# Patient Record
Sex: MALE | Race: WHITE | NOT HISPANIC OR LATINO | Employment: FULL TIME | URBAN - METROPOLITAN AREA
[De-identification: names, ages, dates, MRNs, and addresses within clinical notes are randomized per-mention and may not be internally consistent; named-entity substitution may affect disease eponyms.]

---

## 2020-02-28 ENCOUNTER — OFFICE VISIT (OUTPATIENT)
Dept: FAMILY MEDICINE CLINIC | Facility: CLINIC | Age: 60
End: 2020-02-28

## 2020-02-28 VITALS
HEART RATE: 88 BPM | RESPIRATION RATE: 16 BRPM | WEIGHT: 183 LBS | DIASTOLIC BLOOD PRESSURE: 60 MMHG | HEIGHT: 68 IN | SYSTOLIC BLOOD PRESSURE: 100 MMHG | TEMPERATURE: 98.3 F | OXYGEN SATURATION: 96 % | BODY MASS INDEX: 27.74 KG/M2

## 2020-02-28 DIAGNOSIS — Z23 NEED FOR VACCINATION: ICD-10-CM

## 2020-02-28 DIAGNOSIS — Z79.4 TYPE 2 DIABETES MELLITUS WITHOUT COMPLICATION, WITH LONG-TERM CURRENT USE OF INSULIN (HCC): Primary | ICD-10-CM

## 2020-02-28 DIAGNOSIS — E78.2 MIXED HYPERLIPIDEMIA: ICD-10-CM

## 2020-02-28 DIAGNOSIS — K21.9 GASTROESOPHAGEAL REFLUX DISEASE, ESOPHAGITIS PRESENCE NOT SPECIFIED: ICD-10-CM

## 2020-02-28 DIAGNOSIS — E11.9 TYPE 2 DIABETES MELLITUS WITHOUT COMPLICATION, WITH LONG-TERM CURRENT USE OF INSULIN (HCC): Primary | ICD-10-CM

## 2020-02-28 PROCEDURE — 90715 TDAP VACCINE 7 YRS/> IM: CPT | Performed by: NURSE PRACTITIONER

## 2020-02-28 PROCEDURE — 90471 IMMUNIZATION ADMIN: CPT | Performed by: NURSE PRACTITIONER

## 2020-02-28 PROCEDURE — 90750 HZV VACC RECOMBINANT IM: CPT | Performed by: NURSE PRACTITIONER

## 2020-02-28 PROCEDURE — 99204 OFFICE O/P NEW MOD 45 MIN: CPT | Performed by: NURSE PRACTITIONER

## 2020-02-28 PROCEDURE — 90472 IMMUNIZATION ADMIN EACH ADD: CPT | Performed by: NURSE PRACTITIONER

## 2020-02-28 RX ORDER — METFORMIN HYDROCHLORIDE 500 MG/1
1000 TABLET, EXTENDED RELEASE ORAL
Qty: 60 TABLET | Refills: 1 | Status: SHIPPED | OUTPATIENT
Start: 2020-02-28 | End: 2020-06-16 | Stop reason: SDUPTHER

## 2020-02-28 RX ORDER — LISINOPRIL 5 MG/1
5 TABLET ORAL DAILY
COMMUNITY
End: 2020-02-28 | Stop reason: SDUPTHER

## 2020-02-28 RX ORDER — ATORVASTATIN CALCIUM 20 MG/1
20 TABLET, FILM COATED ORAL DAILY
Qty: 30 TABLET | Refills: 5 | Status: SHIPPED | OUTPATIENT
Start: 2020-02-28 | End: 2020-10-21 | Stop reason: SDUPTHER

## 2020-02-28 RX ORDER — PANTOPRAZOLE SODIUM 40 MG/1
40 TABLET, DELAYED RELEASE ORAL DAILY
COMMUNITY
End: 2020-02-28 | Stop reason: SDUPTHER

## 2020-02-28 RX ORDER — FLASH GLUCOSE SENSOR
KIT MISCELLANEOUS
COMMUNITY
End: 2021-01-22

## 2020-02-28 RX ORDER — PANTOPRAZOLE SODIUM 40 MG/1
40 TABLET, DELAYED RELEASE ORAL DAILY
Qty: 60 TABLET | Refills: 5 | Status: SHIPPED | OUTPATIENT
Start: 2020-02-28 | End: 2020-04-15 | Stop reason: SDUPTHER

## 2020-02-28 RX ORDER — INSULIN DEGLUDEC INJECTION 100 U/ML
INJECTION, SOLUTION SUBCUTANEOUS
COMMUNITY
Start: 2020-02-22 | End: 2020-02-28 | Stop reason: SDUPTHER

## 2020-02-28 RX ORDER — SEMAGLUTIDE 1.34 MG/ML
INJECTION, SOLUTION SUBCUTANEOUS
COMMUNITY
Start: 2020-01-26 | End: 2020-02-28

## 2020-02-28 RX ORDER — NAPROXEN SODIUM 220 MG
220 TABLET ORAL 2 TIMES DAILY PRN
COMMUNITY
End: 2021-01-22 | Stop reason: SDUPTHER

## 2020-02-28 RX ORDER — TERBINAFINE HYDROCHLORIDE 250 MG/1
TABLET ORAL
COMMUNITY
Start: 2020-01-22 | End: 2020-10-23

## 2020-02-28 RX ORDER — INSULIN DEGLUDEC INJECTION 100 U/ML
32 INJECTION, SOLUTION SUBCUTANEOUS NIGHTLY
Qty: 5 PEN | Refills: 5 | Status: SHIPPED | OUTPATIENT
Start: 2020-02-28 | End: 2020-10-21 | Stop reason: SDUPTHER

## 2020-02-28 RX ORDER — SILDENAFIL 25 MG/1
25 TABLET, FILM COATED ORAL DAILY PRN
COMMUNITY
End: 2021-01-22 | Stop reason: SDUPTHER

## 2020-02-28 RX ORDER — METFORMIN HYDROCHLORIDE 500 MG/1
TABLET, EXTENDED RELEASE ORAL
COMMUNITY
Start: 2020-01-12 | End: 2020-02-28 | Stop reason: SDUPTHER

## 2020-02-28 RX ORDER — LISINOPRIL 5 MG/1
5 TABLET ORAL DAILY
Qty: 30 TABLET | Refills: 5 | Status: SHIPPED | OUTPATIENT
Start: 2020-02-28 | End: 2020-10-21 | Stop reason: SDUPTHER

## 2020-02-28 RX ORDER — EMPAGLIFLOZIN 10 MG/1
TABLET, FILM COATED ORAL
COMMUNITY
Start: 2020-02-22 | End: 2020-02-28 | Stop reason: SDUPTHER

## 2020-02-28 RX ORDER — ATORVASTATIN CALCIUM 20 MG/1
20 TABLET, FILM COATED ORAL DAILY
COMMUNITY
End: 2020-02-28 | Stop reason: ALTCHOICE

## 2020-02-28 RX ORDER — EMPAGLIFLOZIN 10 MG/1
10 TABLET, FILM COATED ORAL DAILY
Qty: 30 TABLET | Refills: 5 | Status: SHIPPED | OUTPATIENT
Start: 2020-02-28 | End: 2020-10-21 | Stop reason: SDUPTHER

## 2020-02-28 RX ORDER — CANAGLIFLOZIN 300 MG/1
TABLET, FILM COATED ORAL
COMMUNITY
Start: 2019-12-02 | End: 2020-02-28 | Stop reason: ALTCHOICE

## 2020-02-28 RX ORDER — OMEGA-3S/DHA/EPA/FISH OIL/D3 300MG-1000
400 CAPSULE ORAL DAILY
COMMUNITY
End: 2021-01-22 | Stop reason: SDUPTHER

## 2020-02-28 NOTE — PROGRESS NOTES
Subjective   Sadiq Aldana is a 59 y.o. male.     History of Present Illness    Sadiq Aldana 59 y.o. male who presents today for a new patient appointment.    he has a history of   Patient Active Problem List   Diagnosis   • Type 2 diabetes mellitus without complication, with long-term current use of insulin (CMS/[facility])   • Mixed hyperlipidemia   • Gastroesophageal reflux disease   .  he is here to establish care I reviewed the PFSH recorded today by my MA/LPN staff.   he   He has been feeling well.    Patient reports feeling well on current medication regimen. He had labs a few months ago with previous PCP but is unsure of results.  He is checking his glucose twice a day most days using his Freestyle Eulalio 14 day sensor. Reports his fasting will range from 150s-200s.  He was recently started on Ozempic and Jardiance.  He is tolerating well but would like to use Trulicity instead if it is covered.    Patient does not have HTN but is taking lisinopril for renal protective properties.    He takes pantoprazole BID per his PCP.        The following portions of the patient's history were reviewed and updated as appropriate: allergies, current medications, past family history, past medical history, past social history, past surgical history and problem list.    Review of Systems   Constitutional: Negative for fatigue.   Respiratory: Negative for cough and shortness of breath.    Cardiovascular: Negative for chest pain and palpitations.   Endocrine: Negative for cold intolerance, heat intolerance, polydipsia, polyphagia and polyuria.   Skin: Negative for rash.   Psychiatric/Behavioral: Negative for dysphoric mood and sleep disturbance. The patient is not nervous/anxious.        Objective   Physical Exam   Constitutional: He is oriented to person, place, and time. He appears well-developed and well-nourished.   Neck: Carotid bruit is not present.   Cardiovascular: Normal rate and regular rhythm.   Pulmonary/Chest: Effort  normal and breath sounds normal.   Neurological: He is oriented to person, place, and time.   Skin: Skin is warm and dry.   Psychiatric: He has a normal mood and affect. His behavior is normal. Judgment and thought content normal.   Nursing note and vitals reviewed.      Assessment/Plan   Sadiq was seen today for establish care and diabetes.    Diagnoses and all orders for this visit:    Type 2 diabetes mellitus without complication, with long-term current use of insulin (CMS/[facility])  -     Dulaglutide (TRULICITY) 0.75 MG/0.5ML solution pen-injector; Inject 0.75 mg under the skin into the appropriate area as directed 1 (One) Time Per Week.  -     TRESIBA FLEXTOUCH 100 UNIT/ML solution pen-injector injection; Inject 32 Units under the skin into the appropriate area as directed Every Night.  -     metFORMIN ER (GLUCOPHAGE-XR) 500 MG 24 hr tablet; Take 2 tablets by mouth Daily With Breakfast.  -     lisinopril (PRINIVIL,ZESTRIL) 5 MG tablet; Take 1 tablet by mouth Daily.  -     JARDIANCE 10 MG tablet; Take 10 mg by mouth Daily.  -     pantoprazole (PROTONIX) 40 MG EC tablet; Take 1 tablet by mouth Daily.  -     atorvastatin (LIPITOR) 20 MG tablet; Take 1 tablet by mouth Daily.  -     Comprehensive metabolic panel  -     Lipid panel  -     CBC and Differential  -     TSH  -     Hemoglobin A1c  -     MicroAlbumin, Urine, Random - Urine, Clean Catch  -     PSA Screen    Mixed hyperlipidemia    Gastroesophageal reflux disease, esophagitis presence not specified    Need for vaccination  -     Shingrix Vaccine  -     Tdap Vaccine Greater Than or Equal To 6yo IM        Ozempic changed to Trulicity at patient request.    Pantoprazole refilled at BID dosing but patient will try decreasing to once daily at bedtime.    Labs this week.    Tdap and shingrix today.

## 2020-03-08 LAB
ALBUMIN SERPL-MCNC: 4.5 G/DL (ref 3.8–4.9)
ALBUMIN/GLOB SERPL: 2.4 {RATIO} (ref 1.2–2.2)
ALP SERPL-CCNC: 77 IU/L (ref 39–117)
ALT SERPL-CCNC: 16 IU/L (ref 0–44)
AST SERPL-CCNC: 20 IU/L (ref 0–40)
BASOPHILS # BLD AUTO: 0.1 X10E3/UL (ref 0–0.2)
BASOPHILS NFR BLD AUTO: 1 %
BILIRUB SERPL-MCNC: 0.4 MG/DL (ref 0–1.2)
BUN SERPL-MCNC: 17 MG/DL (ref 6–24)
BUN/CREAT SERPL: 14 (ref 9–20)
CALCIUM SERPL-MCNC: 9.6 MG/DL (ref 8.7–10.2)
CHLORIDE SERPL-SCNC: 101 MMOL/L (ref 96–106)
CHOLEST SERPL-MCNC: 138 MG/DL (ref 100–199)
CO2 SERPL-SCNC: 25 MMOL/L (ref 20–29)
CREAT SERPL-MCNC: 1.22 MG/DL (ref 0.76–1.27)
EOSINOPHIL # BLD AUTO: 0.2 X10E3/UL (ref 0–0.4)
EOSINOPHIL NFR BLD AUTO: 3 %
ERYTHROCYTE [DISTWIDTH] IN BLOOD BY AUTOMATED COUNT: 12.1 % (ref 11.6–15.4)
GLOBULIN SER CALC-MCNC: 1.9 G/DL (ref 1.5–4.5)
GLUCOSE SERPL-MCNC: 130 MG/DL (ref 65–99)
HBA1C MFR BLD: 9.8 % (ref 4.8–5.6)
HCT VFR BLD AUTO: 46 % (ref 37.5–51)
HDLC SERPL-MCNC: 42 MG/DL
HGB BLD-MCNC: 15.2 G/DL (ref 13–17.7)
IMM GRANULOCYTES # BLD AUTO: 0 X10E3/UL (ref 0–0.1)
IMM GRANULOCYTES NFR BLD AUTO: 0 %
LDLC SERPL CALC-MCNC: 80 MG/DL (ref 0–99)
LYMPHOCYTES # BLD AUTO: 1.3 X10E3/UL (ref 0.7–3.1)
LYMPHOCYTES NFR BLD AUTO: 23 %
MCH RBC QN AUTO: 29.6 PG (ref 26.6–33)
MCHC RBC AUTO-ENTMCNC: 33 G/DL (ref 31.5–35.7)
MCV RBC AUTO: 90 FL (ref 79–97)
MICROALBUMIN UR-MCNC: 5.7 UG/ML
MONOCYTES # BLD AUTO: 0.5 X10E3/UL (ref 0.1–0.9)
MONOCYTES NFR BLD AUTO: 9 %
NEUTROPHILS # BLD AUTO: 3.6 X10E3/UL (ref 1.4–7)
NEUTROPHILS NFR BLD AUTO: 64 %
PLATELET # BLD AUTO: 336 X10E3/UL (ref 150–450)
POTASSIUM SERPL-SCNC: 5 MMOL/L (ref 3.5–5.2)
PROT SERPL-MCNC: 6.4 G/DL (ref 6–8.5)
PSA SERPL-MCNC: 1.2 NG/ML (ref 0–4)
RBC # BLD AUTO: 5.13 X10E6/UL (ref 4.14–5.8)
SODIUM SERPL-SCNC: 142 MMOL/L (ref 134–144)
TRIGL SERPL-MCNC: 81 MG/DL (ref 0–149)
TSH SERPL DL<=0.005 MIU/L-ACNC: 1.04 UIU/ML (ref 0.45–4.5)
VLDLC SERPL CALC-MCNC: 16 MG/DL (ref 5–40)
WBC # BLD AUTO: 5.6 X10E3/UL (ref 3.4–10.8)

## 2020-03-13 DIAGNOSIS — E11.21 TYPE 2 DIABETES MELLITUS WITH DIABETIC NEPHROPATHY, UNSPECIFIED WHETHER LONG TERM INSULIN USE (HCC): Primary | ICD-10-CM

## 2020-03-30 RX ORDER — FLASH GLUCOSE SENSOR
1 KIT MISCELLANEOUS 3 TIMES DAILY
Qty: 1 EACH | Refills: 0 | Status: SHIPPED | OUTPATIENT
Start: 2020-03-30 | End: 2020-10-21 | Stop reason: SDUPTHER

## 2020-04-15 ENCOUNTER — TELEPHONE (OUTPATIENT)
Dept: FAMILY MEDICINE CLINIC | Facility: CLINIC | Age: 60
End: 2020-04-15

## 2020-04-15 DIAGNOSIS — E11.9 TYPE 2 DIABETES MELLITUS WITHOUT COMPLICATION, WITH LONG-TERM CURRENT USE OF INSULIN (HCC): ICD-10-CM

## 2020-04-15 DIAGNOSIS — Z79.4 TYPE 2 DIABETES MELLITUS WITHOUT COMPLICATION, WITH LONG-TERM CURRENT USE OF INSULIN (HCC): ICD-10-CM

## 2020-04-15 RX ORDER — PANTOPRAZOLE SODIUM 40 MG/1
40 TABLET, DELAYED RELEASE ORAL 2 TIMES DAILY
Qty: 60 TABLET | Refills: 5 | OUTPATIENT
Start: 2020-04-15 | End: 2020-10-21 | Stop reason: SDUPTHER

## 2020-05-29 DIAGNOSIS — Z79.4 TYPE 2 DIABETES MELLITUS WITHOUT COMPLICATION, WITH LONG-TERM CURRENT USE OF INSULIN (HCC): ICD-10-CM

## 2020-05-29 DIAGNOSIS — E11.9 TYPE 2 DIABETES MELLITUS WITHOUT COMPLICATION, WITH LONG-TERM CURRENT USE OF INSULIN (HCC): ICD-10-CM

## 2020-05-29 RX ORDER — METFORMIN HYDROCHLORIDE 500 MG/1
TABLET, EXTENDED RELEASE ORAL
Qty: 60 TABLET | Refills: 0 | OUTPATIENT
Start: 2020-05-29

## 2020-06-03 ENCOUNTER — OFFICE VISIT (OUTPATIENT)
Dept: FAMILY MEDICINE CLINIC | Facility: CLINIC | Age: 60
End: 2020-06-03

## 2020-06-03 VITALS
HEART RATE: 92 BPM | RESPIRATION RATE: 16 BRPM | OXYGEN SATURATION: 95 % | DIASTOLIC BLOOD PRESSURE: 81 MMHG | SYSTOLIC BLOOD PRESSURE: 131 MMHG | HEIGHT: 68 IN | BODY MASS INDEX: 30.77 KG/M2 | TEMPERATURE: 97.7 F | WEIGHT: 203 LBS

## 2020-06-03 DIAGNOSIS — M54.42 ACUTE LEFT-SIDED LOW BACK PAIN WITH LEFT-SIDED SCIATICA: Primary | ICD-10-CM

## 2020-06-03 PROCEDURE — 99214 OFFICE O/P EST MOD 30 MIN: CPT | Performed by: FAMILY MEDICINE

## 2020-06-03 RX ORDER — METHYLPREDNISOLONE 4 MG/1
TABLET ORAL
Qty: 21 TABLET | Refills: 0 | Status: SHIPPED | OUTPATIENT
Start: 2020-06-03 | End: 2020-06-09

## 2020-06-03 RX ORDER — CYCLOBENZAPRINE HCL 5 MG
5 TABLET ORAL NIGHTLY PRN
Qty: 10 TABLET | Refills: 0 | Status: SHIPPED | OUTPATIENT
Start: 2020-06-03 | End: 2020-06-16

## 2020-06-03 NOTE — ASSESSMENT & PLAN NOTE
New problem.  Agree with physical therapy as ordered.  Will add Medrol Dosepak to decrease inflammation and add muscle relaxer at bedtime dosing only.  Follow-up 3 weeks or as needed if symptoms change or worsen.

## 2020-06-03 NOTE — PROGRESS NOTES
"   Subjective       Chief Complaint   Patient presents with   • pinched nerve     left leg          HPI:       Sadiq Aldana is a 59 y.o. male who presents to the office today to evaluate back pain and left calf pain.  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  Symptoms worsened after 1 day and all last week he developed some pain that radiated down to the left calf.  Currently he is largest amount of pain is in the left calf and less so in the low back on the left side.  He had similar symptoms in 2010 and had work-up including MRI back injections which were then effective.  He had good results in the past with physical therapy.  He has an appointment with [facility] physical therapy on Tuesday.  So far everything he is tried over-the-counter is not work for his discomfort.  Pain is moderate to severe in nature.  Sitting causes worsening pain.  Standing improves the calf pain but causes the rest of his leg to hurt.  Laying down is not comfortable.    I have reviewed and updated his medications, medical history and problem list during today's office visit.   This patient's ACP documentation is up to date, and there's nothing further left to document.   Social History     Tobacco Use   • Smoking status: Never Smoker   • Smokeless tobacco: Never Used   Substance Use Topics   • Alcohol use: Yes     Comment: occasional       Review of Systems   Musculoskeletal:        See HPI   Neurological: Negative for numbness.         PE:   Objective   /81   Pulse 92   Temp 97.7 °F (36.5 °C) (Temporal)   Resp 16   Ht 172.7 cm (68\")   Wt 92.1 kg (203 lb)   SpO2 95%   BMI 30.87 kg/m²     Body mass index is 30.87 kg/m².    Physical Exam   Constitutional: He appears well-developed. He appears distressed (with certain movements).   Musculoskeletal:        Lumbar back: He exhibits decreased range of motion. He exhibits no tenderness, no " bony tenderness, no swelling and no spasm.        Back:    Positive SLR on left at 90 degrees   Psychiatric: He has a normal mood and affect. His speech is normal and behavior is normal. He is attentive.        Data Reviewed:                   A/P:     Assessment/Plan   Diagnoses and all orders for this visit:    1. Acute left-sided low back pain with left-sided sciatica (Primary)  Assessment & Plan:  New problem.  Agree with physical therapy as ordered.  Will add Medrol Dosepak to decrease inflammation and add muscle relaxer at bedtime dosing only.  Follow-up 3 weeks or as needed if symptoms change or worsen.    Orders:  -     methylPREDNISolone (MEDROL, MANJIT,) 4 MG tablet; Take as directed on package instructions.  Dispense: 21 tablet; Refill: 0  -     cyclobenzaprine (FLEXERIL) 5 MG tablet; Take 1 tablet by mouth At Night As Needed for Muscle Spasms for up to 10 days. Avoid with driving. Do not use alcohol with this prescription  Dispense: 10 tablet; Refill: 0  -     Ambulatory Referral to Physical Therapy Evaluate and treat        Follow up:    Return in about 3 weeks (around 6/24/2020) for Recheck/Medication  Refill.

## 2020-06-12 DIAGNOSIS — Z79.4 TYPE 2 DIABETES MELLITUS WITHOUT COMPLICATION, WITH LONG-TERM CURRENT USE OF INSULIN (HCC): ICD-10-CM

## 2020-06-12 DIAGNOSIS — E11.9 TYPE 2 DIABETES MELLITUS WITHOUT COMPLICATION, WITH LONG-TERM CURRENT USE OF INSULIN (HCC): ICD-10-CM

## 2020-06-12 RX ORDER — METFORMIN HYDROCHLORIDE 500 MG/1
TABLET, EXTENDED RELEASE ORAL
Qty: 60 TABLET | Refills: 0 | OUTPATIENT
Start: 2020-06-12

## 2020-06-16 ENCOUNTER — OFFICE VISIT (OUTPATIENT)
Dept: FAMILY MEDICINE CLINIC | Facility: CLINIC | Age: 60
End: 2020-06-16

## 2020-06-16 VITALS
HEIGHT: 68 IN | HEART RATE: 87 BPM | RESPIRATION RATE: 16 BRPM | BODY MASS INDEX: 29.86 KG/M2 | OXYGEN SATURATION: 96 % | SYSTOLIC BLOOD PRESSURE: 110 MMHG | TEMPERATURE: 97.8 F | WEIGHT: 197 LBS | DIASTOLIC BLOOD PRESSURE: 64 MMHG

## 2020-06-16 DIAGNOSIS — E11.9 TYPE 2 DIABETES MELLITUS WITHOUT COMPLICATION, WITH LONG-TERM CURRENT USE OF INSULIN (HCC): ICD-10-CM

## 2020-06-16 DIAGNOSIS — Z79.4 TYPE 2 DIABETES MELLITUS WITHOUT COMPLICATION, WITH LONG-TERM CURRENT USE OF INSULIN (HCC): ICD-10-CM

## 2020-06-16 PROCEDURE — 99212 OFFICE O/P EST SF 10 MIN: CPT | Performed by: NURSE PRACTITIONER

## 2020-06-16 RX ORDER — METFORMIN HYDROCHLORIDE 500 MG/1
1000 TABLET, EXTENDED RELEASE ORAL
Qty: 60 TABLET | Refills: 1 | Status: SHIPPED | OUTPATIENT
Start: 2020-06-16 | End: 2020-08-18

## 2020-06-16 NOTE — PROGRESS NOTES
"Vernon Aldana is a 59 y.o. male.     History of Present Illness    Since the last visit, he has overall felt well.  He has He is following up on type 2 DM.  His A1c was 9.8 at last visit.  He has not had repeat A1c yet.  He was changed to Trulicity at last visit and was to decrease glucose and carbs.  he has been compliant with current medications have reviewed them.  The patient denies medication side effects.  Will refill medications. /64   Pulse 87   Temp 97.8 °F (36.6 °C)   Resp 16   Ht 172.7 cm (68\")   Wt 89.4 kg (197 lb)   SpO2 96%   BMI 29.95 kg/m²     Results for orders placed or performed in visit on 02/28/20   Comprehensive metabolic panel   Result Value Ref Range    Glucose 130 (H) 65 - 99 mg/dL    BUN 17 6 - 24 mg/dL    Creatinine 1.22 0.76 - 1.27 mg/dL    eGFR Non African Am 64 >59 mL/min/1.73    eGFR African Am 75 >59 mL/min/1.73    BUN/Creatinine Ratio 14 9 - 20    Sodium 142 134 - 144 mmol/L    Potassium 5.0 3.5 - 5.2 mmol/L    Chloride 101 96 - 106 mmol/L    Total CO2 25 20 - 29 mmol/L    Calcium 9.6 8.7 - 10.2 mg/dL    Total Protein 6.4 6.0 - 8.5 g/dL    Albumin 4.5 3.8 - 4.9 g/dL    Globulin 1.9 1.5 - 4.5 g/dL    A/G Ratio 2.4 (H) 1.2 - 2.2    Total Bilirubin 0.4 0.0 - 1.2 mg/dL    Alkaline Phosphatase 77 39 - 117 IU/L    AST (SGOT) 20 0 - 40 IU/L    ALT (SGPT) 16 0 - 44 IU/L   Lipid panel   Result Value Ref Range    Total Cholesterol 138 100 - 199 mg/dL    Triglycerides 81 0 - 149 mg/dL    HDL Cholesterol 42 >39 mg/dL    VLDL Cholesterol 16 5 - 40 mg/dL    LDL Cholesterol  80 0 - 99 mg/dL   TSH   Result Value Ref Range    TSH 1.040 0.450 - 4.500 uIU/mL   Hemoglobin A1c   Result Value Ref Range    Hemoglobin A1C 9.8 (H) 4.8 - 5.6 %   MicroAlbumin, Urine, Random - Urine, Clean Catch   Result Value Ref Range    Microalbumin, Urine 5.7 Not Estab. ug/mL   PSA Screen   Result Value Ref Range    PSA 1.2 0.0 - 4.0 ng/mL   CBC and Differential   Result Value Ref Range    WBC 5.6 " 3.4 - 10.8 x10E3/uL    RBC 5.13 4.14 - 5.80 x10E6/uL    Hemoglobin 15.2 13.0 - 17.7 g/dL    Hematocrit 46.0 37.5 - 51.0 %    MCV 90 79 - 97 fL    MCH 29.6 26.6 - 33.0 pg    MCHC 33.0 31.5 - 35.7 g/dL    RDW 12.1 11.6 - 15.4 %    Platelets 336 150 - 450 x10E3/uL    Neutrophil Rel % 64 Not Estab. %    Lymphocyte Rel % 23 Not Estab. %    Monocyte Rel % 9 Not Estab. %    Eosinophil Rel % 3 Not Estab. %    Basophil Rel % 1 Not Estab. %    Neutrophils Absolute 3.6 1.4 - 7.0 x10E3/uL    Lymphocytes Absolute 1.3 0.7 - 3.1 x10E3/uL    Monocytes Absolute 0.5 0.1 - 0.9 x10E3/uL    Eosinophils Absolute 0.2 0.0 - 0.4 x10E3/uL    Basophils Absolute 0.1 0.0 - 0.2 x10E3/uL    Immature Granulocyte Rel % 0 Not Estab. %    Immature Grans Absolute 0.0 0.0 - 0.1 x10E3/uL       Reports glucose at home has ranged from 120-200.  He was not eating well for the months he was off work due to COVID but has been doing better the past couple of weeks.    The following portions of the patient's history were reviewed and updated as appropriate: allergies, current medications, past family history, past medical history, past social history, past surgical history and problem list.    Review of Systems   Constitutional: Negative for fatigue.   Respiratory: Negative for cough and shortness of breath.    Cardiovascular: Negative for chest pain and palpitations.   Endocrine: Negative for cold intolerance, heat intolerance, polydipsia, polyphagia and polyuria.   Skin: Negative for rash.   Psychiatric/Behavioral: Negative for dysphoric mood and sleep disturbance. The patient is not nervous/anxious.        Objective   Physical Exam   Constitutional: He is oriented to person, place, and time. He appears well-developed and well-nourished.   Pulmonary/Chest: Effort normal.   Neurological: He is alert and oriented to person, place, and time.   Psychiatric: He has a normal mood and affect. His behavior is normal. Judgment and thought content normal.   Nursing  note and vitals reviewed.      Assessment/Plan   Sadiq was seen today for diabetes and hyperlipidemia.    Diagnoses and all orders for this visit:    Type 2 diabetes mellitus without complication, with long-term current use of insulin (CMS/[facility])  -     metFORMIN ER (GLUCOPHAGE-XR) 500 MG 24 hr tablet; Take 2 tablets by mouth Daily With Breakfast.  -     Dulaglutide (Trulicity) 1.5 MG/0.5ML solution pen-injector; Inject 1.5 mg under the skin into the appropriate area as directed 1 (One) Time Per Week.        A1c this week

## 2020-06-17 LAB — HBA1C MFR BLD: 10.5 % (ref 4.8–5.6)

## 2020-06-18 ENCOUNTER — RESULTS ENCOUNTER (OUTPATIENT)
Dept: FAMILY MEDICINE CLINIC | Facility: CLINIC | Age: 60
End: 2020-06-18

## 2020-06-18 DIAGNOSIS — E11.21 TYPE 2 DIABETES MELLITUS WITH DIABETIC NEPHROPATHY, UNSPECIFIED WHETHER LONG TERM INSULIN USE (HCC): ICD-10-CM

## 2020-06-18 DIAGNOSIS — R73.09 HEMOGLOBIN A1C 8.0 PERCENT OR GREATER: Primary | ICD-10-CM

## 2020-08-16 DIAGNOSIS — Z79.4 TYPE 2 DIABETES MELLITUS WITHOUT COMPLICATION, WITH LONG-TERM CURRENT USE OF INSULIN (HCC): ICD-10-CM

## 2020-08-16 DIAGNOSIS — E11.9 TYPE 2 DIABETES MELLITUS WITHOUT COMPLICATION, WITH LONG-TERM CURRENT USE OF INSULIN (HCC): ICD-10-CM

## 2020-08-18 RX ORDER — METFORMIN HYDROCHLORIDE 500 MG/1
TABLET, EXTENDED RELEASE ORAL
Qty: 60 TABLET | Refills: 0 | Status: SHIPPED | OUTPATIENT
Start: 2020-08-18 | End: 2020-10-21 | Stop reason: SDUPTHER

## 2020-10-15 DIAGNOSIS — E11.9 TYPE 2 DIABETES MELLITUS WITHOUT COMPLICATION, WITH LONG-TERM CURRENT USE OF INSULIN (HCC): ICD-10-CM

## 2020-10-15 DIAGNOSIS — Z79.4 TYPE 2 DIABETES MELLITUS WITHOUT COMPLICATION, WITH LONG-TERM CURRENT USE OF INSULIN (HCC): ICD-10-CM

## 2020-10-15 RX ORDER — EMPAGLIFLOZIN 10 MG/1
TABLET, FILM COATED ORAL
Qty: 30 TABLET | Refills: 4 | OUTPATIENT
Start: 2020-10-15

## 2020-10-15 RX ORDER — METFORMIN HYDROCHLORIDE 500 MG/1
TABLET, EXTENDED RELEASE ORAL
Qty: 60 TABLET | Refills: 0 | OUTPATIENT
Start: 2020-10-15

## 2020-10-20 ENCOUNTER — TELEPHONE (OUTPATIENT)
Dept: FAMILY MEDICINE CLINIC | Facility: CLINIC | Age: 60
End: 2020-10-20

## 2020-10-20 NOTE — TELEPHONE ENCOUNTER
Rescheduled appt for tomorrow 10/21/20 but patient is completely out of medications.  He would like meds sent in ASAP/kk

## 2020-10-21 ENCOUNTER — TELEMEDICINE (OUTPATIENT)
Dept: FAMILY MEDICINE CLINIC | Facility: CLINIC | Age: 60
End: 2020-10-21

## 2020-10-21 DIAGNOSIS — E11.65 UNCONTROLLED TYPE 2 DIABETES MELLITUS WITH HYPERGLYCEMIA (HCC): Primary | ICD-10-CM

## 2020-10-21 DIAGNOSIS — Z79.4 TYPE 2 DIABETES MELLITUS WITHOUT COMPLICATION, WITH LONG-TERM CURRENT USE OF INSULIN (HCC): ICD-10-CM

## 2020-10-21 DIAGNOSIS — E78.2 MIXED HYPERLIPIDEMIA: ICD-10-CM

## 2020-10-21 DIAGNOSIS — E11.9 TYPE 2 DIABETES MELLITUS WITHOUT COMPLICATION, WITH LONG-TERM CURRENT USE OF INSULIN (HCC): ICD-10-CM

## 2020-10-21 DIAGNOSIS — I10 ESSENTIAL HYPERTENSION: ICD-10-CM

## 2020-10-21 DIAGNOSIS — K21.9 GASTROESOPHAGEAL REFLUX DISEASE, UNSPECIFIED WHETHER ESOPHAGITIS PRESENT: ICD-10-CM

## 2020-10-21 PROCEDURE — 99213 OFFICE O/P EST LOW 20 MIN: CPT | Performed by: NURSE PRACTITIONER

## 2020-10-21 RX ORDER — INSULIN DEGLUDEC INJECTION 100 U/ML
32 INJECTION, SOLUTION SUBCUTANEOUS NIGHTLY
Qty: 5 PEN | Refills: 0 | Status: SHIPPED | OUTPATIENT
Start: 2020-10-21 | End: 2021-01-11 | Stop reason: SDUPTHER

## 2020-10-21 RX ORDER — EMPAGLIFLOZIN 10 MG/1
10 TABLET, FILM COATED ORAL DAILY
Qty: 30 TABLET | Refills: 5 | Status: CANCELLED | OUTPATIENT
Start: 2020-10-21

## 2020-10-21 RX ORDER — DULAGLUTIDE 1.5 MG/.5ML
1.5 INJECTION, SOLUTION SUBCUTANEOUS WEEKLY
Qty: 12 PEN | Refills: 0 | Status: SHIPPED | OUTPATIENT
Start: 2020-10-21 | End: 2021-01-04 | Stop reason: SDUPTHER

## 2020-10-21 RX ORDER — INSULIN DEGLUDEC INJECTION 100 U/ML
32 INJECTION, SOLUTION SUBCUTANEOUS NIGHTLY
Qty: 5 PEN | Refills: 5 | Status: CANCELLED | OUTPATIENT
Start: 2020-10-21

## 2020-10-21 RX ORDER — METFORMIN HYDROCHLORIDE 500 MG/1
1000 TABLET, EXTENDED RELEASE ORAL
Qty: 180 TABLET | Refills: 0 | Status: SHIPPED | OUTPATIENT
Start: 2020-10-21 | End: 2021-01-15

## 2020-10-21 RX ORDER — FLASH GLUCOSE SENSOR
1 KIT MISCELLANEOUS 3 TIMES DAILY
Qty: 9 EACH | Refills: 0 | Status: SHIPPED | OUTPATIENT
Start: 2020-10-21 | End: 2021-01-22 | Stop reason: SDUPTHER

## 2020-10-21 RX ORDER — ATORVASTATIN CALCIUM 20 MG/1
20 TABLET, FILM COATED ORAL DAILY
Qty: 30 TABLET | Refills: 5 | Status: SHIPPED | OUTPATIENT
Start: 2020-10-21 | End: 2020-10-23

## 2020-10-21 RX ORDER — PANTOPRAZOLE SODIUM 40 MG/1
40 TABLET, DELAYED RELEASE ORAL 2 TIMES DAILY
Qty: 60 TABLET | Refills: 5 | OUTPATIENT
Start: 2020-10-21 | End: 2021-01-04 | Stop reason: SDUPTHER

## 2020-10-21 RX ORDER — EMPAGLIFLOZIN 10 MG/1
10 TABLET, FILM COATED ORAL DAILY
Qty: 90 TABLET | Refills: 0 | Status: SHIPPED | OUTPATIENT
Start: 2020-10-21 | End: 2021-01-22 | Stop reason: SDUPTHER

## 2020-10-21 RX ORDER — LISINOPRIL 5 MG/1
5 TABLET ORAL DAILY
Qty: 30 TABLET | Refills: 5 | Status: SHIPPED | OUTPATIENT
Start: 2020-10-21 | End: 2021-01-04 | Stop reason: SDUPTHER

## 2020-10-21 RX ORDER — METFORMIN HYDROCHLORIDE 500 MG/1
1000 TABLET, EXTENDED RELEASE ORAL
Qty: 60 TABLET | Refills: 0 | Status: CANCELLED | OUTPATIENT
Start: 2020-10-21

## 2020-10-21 NOTE — PROGRESS NOTES
Subjective   Sadiq Aldana is a 59 y.o. male.   You have chosen to receive care through a telehealth visit.  Do you consent to use a video/audio connection for your medical care today? Yes  History of Present Illness    Since the last visit, he has overall felt well.  He has Essential Hypertension and well controlled on current medication and DM 2 and was unable to see endocrinology as the doctor left the practice. Patient has been unable to get in with a different provider.  He is still taking Tresiba, Trulicity, Jardiance, metformin but is out of medications.  he has been compliant with current medications have reviewed them.  The patient denies medication side effects.  Will refill medications. There were no vitals taken for this visit.    Results for orders placed or performed in visit on 06/18/20   Hemoglobin A1c    Specimen: Blood   Result Value Ref Range    Hemoglobin A1C 10.5 (H) 4.8 - 5.6 %     He is due for medication refills and labs. He is compliant with his atorvastatin, lisinopril and pantoprazole.      The following portions of the patient's history were reviewed and updated as appropriate: allergies, current medications, past family history, past medical history, past social history, past surgical history and problem list.    Review of Systems   Constitutional: Negative for fatigue.   Eyes: Negative for visual disturbance.   Respiratory: Negative for cough and shortness of breath.    Cardiovascular: Negative for chest pain and palpitations.   Endocrine: Negative for cold intolerance, heat intolerance, polydipsia, polyphagia and polyuria.   Skin: Negative for rash.   Psychiatric/Behavioral: Negative for dysphoric mood and sleep disturbance. The patient is not nervous/anxious.        Objective   Physical Exam  Vitals signs and nursing note reviewed.   Constitutional:       Appearance: He is well-developed.   Pulmonary:      Effort: Pulmonary effort is normal.   Neurological:      Mental Status: He is  oriented to person, place, and time.   Psychiatric:         Behavior: Behavior normal.         Thought Content: Thought content normal.         Judgment: Judgment normal.         Assessment/Plan   Diagnoses and all orders for this visit:    1. Uncontrolled type 2 diabetes mellitus with hyperglycemia (CMS/HCC) (Primary)  -     Ambulatory Referral to Endocrinology  -     Continuous Blood Gluc Sensor (FreeStyle Eulalio 14 Day Sensor) misc; 1 Units 3 (Three) Times a Day.  Dispense: 9 each; Refill: 0  -     Comprehensive metabolic panel  -     Lipid panel  -     CBC and Differential  -     TSH  -     Hemoglobin A1c  -     MicroAlbumin, Urine, Random - Urine, Clean Catch    2. Type 2 diabetes mellitus without complication, with long-term current use of insulin (CMS/[facility])  -     Tresiba FlexTouch 100 UNIT/ML solution pen-injector injection; Inject 32 Units under the skin into the appropriate area as directed Every Night.  Dispense: 5 pen; Refill: 0  -     metFORMIN ER (GLUCOPHAGE-XR) 500 MG 24 hr tablet; Take 2 tablets by mouth Daily With Breakfast.  Dispense: 180 tablet; Refill: 0  -     Dulaglutide (Trulicity) 1.5 MG/0.5ML solution pen-injector; Inject 1.5 mg under the skin into the appropriate area as directed 1 (One) Time Per Week.  Dispense: 12 pen; Refill: 0  -     Jardiance 10 MG tablet; Take 10 mg by mouth Daily.  Dispense: 90 tablet; Refill: 0    3. Mixed hyperlipidemia    4. Gastroesophageal reflux disease, unspecified whether esophagitis present    5. Essential hypertension          This visit has been rescheduled as a phone/video visit to comply with patient safety concerns in accordance with CDC recommendations. Total time of discussion was 14 minutes.      Referral placed to endocrinologist of patient's choice.  Labs ordered. Meds refilled until he is able to get in with specialist.

## 2020-10-23 ENCOUNTER — OFFICE VISIT (OUTPATIENT)
Dept: INTERNAL MEDICINE | Facility: CLINIC | Age: 60
End: 2020-10-23

## 2020-10-23 VITALS
OXYGEN SATURATION: 98 % | DIASTOLIC BLOOD PRESSURE: 80 MMHG | SYSTOLIC BLOOD PRESSURE: 146 MMHG | BODY MASS INDEX: 30.46 KG/M2 | WEIGHT: 201 LBS | HEIGHT: 68 IN | HEART RATE: 95 BPM

## 2020-10-23 DIAGNOSIS — E66.01 MORBID (SEVERE) OBESITY DUE TO EXCESS CALORIES (HCC): ICD-10-CM

## 2020-10-23 DIAGNOSIS — Z79.4 TYPE 2 DIABETES MELLITUS WITHOUT COMPLICATION, WITH LONG-TERM CURRENT USE OF INSULIN (HCC): Primary | ICD-10-CM

## 2020-10-23 DIAGNOSIS — I10 ESSENTIAL HYPERTENSION: ICD-10-CM

## 2020-10-23 DIAGNOSIS — E78.2 MIXED HYPERLIPIDEMIA: ICD-10-CM

## 2020-10-23 DIAGNOSIS — E11.9 TYPE 2 DIABETES MELLITUS WITHOUT COMPLICATION, WITH LONG-TERM CURRENT USE OF INSULIN (HCC): Primary | ICD-10-CM

## 2020-10-23 PROCEDURE — 99204 OFFICE O/P NEW MOD 45 MIN: CPT | Performed by: FAMILY MEDICINE

## 2020-10-23 RX ORDER — ATORVASTATIN CALCIUM 40 MG/1
40 TABLET, FILM COATED ORAL DAILY
Qty: 90 TABLET | Refills: 4 | Status: SHIPPED | OUTPATIENT
Start: 2020-10-23 | End: 2021-01-04 | Stop reason: SDUPTHER

## 2020-10-23 RX ORDER — ASPIRIN 81 MG/1
81 TABLET ORAL DAILY
COMMUNITY
End: 2021-01-22 | Stop reason: SDUPTHER

## 2020-10-23 NOTE — PROGRESS NOTES
10/23/2020    Patient Information  Sadiq Aldana                                                                                          [address]    [place] [address]      Chief Complaint:   Chief Complaint   Patient presents with   • Establish Care   • Dizziness          History of Present Illness:   pt here to est care.  Not requesting med refills.  reports blood sugars are in 200-300s.   Endorses some mild vertigo only at night and sometimes in the morning. He reports of increased chronic nasal congestion recently.  Denies any cp, palpitations, lightheadedness, abdm pain, stool/urinary changes. No vision changes.      Review of Systems   Constitution: Negative.   HENT: Positive for congestion.    Eyes: Negative.    Cardiovascular: Negative.    Respiratory: Negative.    Endocrine: Negative.    Hematologic/Lymphatic: Negative.    Skin: Negative.    Musculoskeletal: Negative.    Gastrointestinal: Negative.    Genitourinary: Negative.    Neurological: Negative.    Psychiatric/Behavioral: Negative.    Allergic/Immunologic: Negative.        Active Problems:    Patient Active Problem List   Diagnosis   • Type 2 diabetes mellitus without complication, with long-term current use of insulin (CMS/[facility])   • Mixed hyperlipidemia   • Gastroesophageal reflux disease   • Acute left-sided low back pain with left-sided sciatica         Past Medical History:   Diagnosis Date   • Diabetes mellitus (CMS/HCC)    • Erectile dysfunction    • GERD (gastroesophageal reflux disease)    • Hyperlipidemia    • Hypertension    • Peptic ulceration          Past Surgical History:   Procedure Laterality Date   • CHOLECYSTECTOMY     • FRACTURE SURGERY           No Known Allergies        Current Outpatient Medications:   •  aspirin 81 MG EC tablet, Take 81 mg by mouth Daily., Disp: , Rfl:   •  cholecalciferol (VITAMIN D3) 10 MCG (400 UNIT) tablet, Take 400 Units by mouth Daily., Disp: , Rfl:   •  Continuous  Blood Gluc Sensor (FREESTYLE PRAKASH 14 DAY SENSOR) misc, , Disp: , Rfl:   •  Continuous Blood Gluc Sensor (FreeStyle Prakash 14 Day Sensor) misc, 1 Units 3 (Three) Times a Day., Disp: 9 each, Rfl: 0  •  Dulaglutide (Trulicity) 1.5 MG/0.5ML solution pen-injector, Inject 1.5 mg under the skin into the appropriate area as directed 1 (One) Time Per Week., Disp: 12 pen, Rfl: 0  •  Jardiance 10 MG tablet, Take 10 mg by mouth Daily., Disp: 90 tablet, Rfl: 0  •  lisinopril (PRINIVIL,ZESTRIL) 5 MG tablet, Take 1 tablet by mouth Daily., Disp: 30 tablet, Rfl: 5  •  metFORMIN ER (GLUCOPHAGE-XR) 500 MG 24 hr tablet, Take 2 tablets by mouth Daily With Breakfast., Disp: 180 tablet, Rfl: 0  •  naproxen sodium (ALEVE) 220 MG tablet, Take 220 mg by mouth 2 (Two) Times a Day As Needed., Disp: , Rfl:   •  pantoprazole (PROTONIX) 40 MG EC tablet, Take 1 tablet by mouth 2 (Two) Times a Day., Disp: 60 tablet, Rfl: 5  •  sildenafil (VIAGRA) 25 MG tablet, Take 25 mg by mouth Daily As Needed for Erectile Dysfunction., Disp: , Rfl:   •  Tresiba FlexTouch 100 UNIT/ML solution pen-injector injection, Inject 32 Units under the skin into the appropriate area as directed Every Night., Disp: 5 pen, Rfl: 0  •  atorvastatin (Lipitor) 40 MG tablet, Take 1 tablet by mouth Daily., Disp: 90 tablet, Rfl: 4      Family History   Problem Relation Age of Onset   • Cancer Mother    • Heart disease Mother    • Other Father    • No Known Problems Sister    • Cancer Brother    • No Known Problems Sister    • No Known Problems Sister    • No Known Problems Sister    • Other Sister    • Other Sister          Social History     Socioeconomic History   • Marital status:      Spouse name: Not on file   • Number of children: Not on file   • Years of education: Not on file   • Highest education level: Not on file   Tobacco Use   • Smoking status: Never Smoker   • Smokeless tobacco: Never Used   Substance and Sexual Activity   • Alcohol use: Yes     Comment: monthly  "  • Drug use: Never   • Sexual activity: Yes     Partners: Female         Physical Exam  Constitutional:       Appearance: Normal appearance. He is obese.   HENT:      Ears:      Comments: B/l serous fluid behind TMs.  Cardiovascular:      Rate and Rhythm: Normal rate and regular rhythm.      Pulses: Normal pulses.      Heart sounds: Normal heart sounds.   Pulmonary:      Effort: Pulmonary effort is normal.      Breath sounds: Normal breath sounds.   Abdominal:      General: Abdomen is flat. Bowel sounds are normal. There is no distension.      Palpations: Abdomen is soft. There is no mass.      Tenderness: There is no abdominal tenderness.   Skin:     General: Skin is warm.   Neurological:      General: No focal deficit present.      Mental Status: He is alert and oriented to person, place, and time. Mental status is at baseline.   Psychiatric:         Mood and Affect: Mood normal.         Behavior: Behavior normal.         Thought Content: Thought content normal.         Judgment: Judgment normal.          Vitals:    10/23/20 0806   BP: 146/80   BP Location: Left arm   Patient Position: Sitting   Cuff Size: Adult   Pulse: 95   SpO2: 98%   Weight: 91.2 kg (201 lb)   Height: 172.7 cm (68\")       Body mass index is 30.56 kg/m².       Assessment/Plan:  HLD--increased atorvatatin to 40mg for mod dose intensity.  DM2--DM foot exam next OV. F/u eye exam. Increase tresiba by 1 unit until BGMs are consistently less than 200. A1c next ov. Endo referral in.    Diagnoses and all orders for this visit:    1. Type 2 diabetes mellitus without complication, with long-term current use of insulin (CMS/[facility]) (Primary)  -     Ambulatory Referral to Endocrinology  -     Ambulatory Referral to Optometry    2. Mixed hyperlipidemia    3. Morbid (severe) obesity due to excess calories (CMS/[facility])    4. Essential hypertension    Other orders  -     atorvastatin (Lipitor) 40 MG tablet; Take 1 tablet by mouth Daily.  Dispense: 90 tablet; " Refill: 4

## 2020-12-30 DIAGNOSIS — E11.9 TYPE 2 DIABETES MELLITUS WITHOUT COMPLICATION, WITH LONG-TERM CURRENT USE OF INSULIN (HCC): ICD-10-CM

## 2020-12-30 DIAGNOSIS — Z79.4 TYPE 2 DIABETES MELLITUS WITHOUT COMPLICATION, WITH LONG-TERM CURRENT USE OF INSULIN (HCC): ICD-10-CM

## 2020-12-30 RX ORDER — PANTOPRAZOLE SODIUM 40 MG/1
TABLET, DELAYED RELEASE ORAL
Qty: 60 TABLET | Refills: 4 | OUTPATIENT
Start: 2020-12-30

## 2021-01-04 DIAGNOSIS — Z79.4 TYPE 2 DIABETES MELLITUS WITHOUT COMPLICATION, WITH LONG-TERM CURRENT USE OF INSULIN (HCC): ICD-10-CM

## 2021-01-04 DIAGNOSIS — E11.9 TYPE 2 DIABETES MELLITUS WITHOUT COMPLICATION, WITH LONG-TERM CURRENT USE OF INSULIN (HCC): ICD-10-CM

## 2021-01-04 RX ORDER — DULAGLUTIDE 1.5 MG/.5ML
1.5 INJECTION, SOLUTION SUBCUTANEOUS WEEKLY
Qty: 12 PEN | Refills: 0 | Status: SHIPPED | OUTPATIENT
Start: 2021-01-04 | End: 2021-01-12 | Stop reason: SDUPTHER

## 2021-01-04 RX ORDER — LISINOPRIL 5 MG/1
5 TABLET ORAL DAILY
Qty: 30 TABLET | Refills: 5 | Status: SHIPPED | OUTPATIENT
Start: 2021-01-04 | End: 2021-01-12 | Stop reason: SDUPTHER

## 2021-01-04 RX ORDER — PANTOPRAZOLE SODIUM 40 MG/1
40 TABLET, DELAYED RELEASE ORAL 2 TIMES DAILY
Qty: 60 TABLET | Refills: 5 | OUTPATIENT
Start: 2021-01-04 | End: 2021-01-12 | Stop reason: SDUPTHER

## 2021-01-04 RX ORDER — ATORVASTATIN CALCIUM 40 MG/1
40 TABLET, FILM COATED ORAL DAILY
Qty: 90 TABLET | Refills: 4 | Status: SHIPPED | OUTPATIENT
Start: 2021-01-04 | End: 2021-01-12 | Stop reason: SDUPTHER

## 2021-01-04 NOTE — TELEPHONE ENCOUNTER
Caller: Sadiq Aldana    Relationship: Self    Best call back number:[phone]   Medication needed:   Requested Prescriptions     Pending Prescriptions Disp Refills   • pantoprazole (PROTONIX) 40 MG EC tablet 60 tablet 5     Sig: Take 1 tablet by mouth 2 (Two) Times a Day.   • lisinopril (PRINIVIL,ZESTRIL) 5 MG tablet 30 tablet 5     Sig: Take 1 tablet by mouth Daily.   • Dulaglutide (Trulicity) 1.5 MG/0.5ML solution pen-injector 12 pen 0     Sig: Inject 1.5 mg under the skin into the appropriate area as directed 1 (One) Time Per Week.   • atorvastatin (Lipitor) 40 MG tablet 90 tablet 4     Sig: Take 1 tablet by mouth Daily.       When do you need the refill by: ASAP      Does the patient have less than a 3 day supply:  [x] Yes  [] No    What is the patient's preferred pharmacy: [facility] [address] AT Count [facility] the [facility] & [place] - [phone] [facility] [phone] FX

## 2021-01-11 DIAGNOSIS — E11.9 TYPE 2 DIABETES MELLITUS WITHOUT COMPLICATION, WITH LONG-TERM CURRENT USE OF INSULIN (HCC): ICD-10-CM

## 2021-01-11 DIAGNOSIS — Z79.4 TYPE 2 DIABETES MELLITUS WITHOUT COMPLICATION, WITH LONG-TERM CURRENT USE OF INSULIN (HCC): ICD-10-CM

## 2021-01-11 NOTE — TELEPHONE ENCOUNTER
Patient called the pharmacy and still have not received medications    Caller: Sadiq Aldana     Relationship: Self     Best call back number:[phone]   Medication needed:   Requested Prescriptions             Pending Prescriptions Disp Refills   • pantoprazole (PROTONIX) 40 MG EC tablet 60 tablet 5       Sig: Take 1 tablet by mouth 2 (Two) Times a Day.   • lisinopril (PRINIVIL,ZESTRIL) 5 MG tablet 30 tablet 5       Sig: Take 1 tablet by mouth Daily.   • Dulaglutide (Trulicity) 1.5 MG/0.5ML solution pen-injector 12 pen 0       Sig: Inject 1.5 mg under the skin into the appropriate area as directed 1 (One) Time Per Week.   • atorvastatin (Lipitor) 40 MG tablet 90 tablet 4       Sig: Take 1 tablet by mouth Daily.         When do you need the refill by: ASAP     Does the patient have less than a 3 day supply:  [x]? Yes  []? No     What is the patient's preferred pharmacy: [facility] [address] & [place] - [phone] [facility] [phone] FX

## 2021-01-12 DIAGNOSIS — E11.9 TYPE 2 DIABETES MELLITUS WITHOUT COMPLICATION, WITH LONG-TERM CURRENT USE OF INSULIN (HCC): ICD-10-CM

## 2021-01-12 DIAGNOSIS — Z79.4 TYPE 2 DIABETES MELLITUS WITHOUT COMPLICATION, WITH LONG-TERM CURRENT USE OF INSULIN (HCC): ICD-10-CM

## 2021-01-12 RX ORDER — ATORVASTATIN CALCIUM 40 MG/1
40 TABLET, FILM COATED ORAL DAILY
Qty: 90 TABLET | Refills: 4 | Status: SHIPPED | OUTPATIENT
Start: 2021-01-12 | End: 2021-01-22 | Stop reason: SDUPTHER

## 2021-01-12 RX ORDER — LISINOPRIL 5 MG/1
5 TABLET ORAL DAILY
Qty: 90 TABLET | Refills: 4 | Status: SHIPPED | OUTPATIENT
Start: 2021-01-12 | End: 2021-01-22 | Stop reason: SDUPTHER

## 2021-01-12 RX ORDER — PANTOPRAZOLE SODIUM 40 MG/1
40 TABLET, DELAYED RELEASE ORAL 2 TIMES DAILY
Qty: 180 TABLET | Refills: 5 | OUTPATIENT
Start: 2021-01-12 | End: 2021-01-13 | Stop reason: SDUPTHER

## 2021-01-12 RX ORDER — DULAGLUTIDE 1.5 MG/.5ML
1.5 INJECTION, SOLUTION SUBCUTANEOUS WEEKLY
Qty: 12 PEN | Refills: 10 | Status: SHIPPED | OUTPATIENT
Start: 2021-01-12 | End: 2021-01-22 | Stop reason: SDUPTHER

## 2021-01-12 RX ORDER — INSULIN DEGLUDEC INJECTION 100 U/ML
32 INJECTION, SOLUTION SUBCUTANEOUS NIGHTLY
Qty: 5 PEN | Refills: 0 | Status: SHIPPED | OUTPATIENT
Start: 2021-01-12 | End: 2021-01-15 | Stop reason: SDUPTHER

## 2021-01-13 DIAGNOSIS — Z79.4 TYPE 2 DIABETES MELLITUS WITHOUT COMPLICATION, WITH LONG-TERM CURRENT USE OF INSULIN (HCC): ICD-10-CM

## 2021-01-13 DIAGNOSIS — E11.9 TYPE 2 DIABETES MELLITUS WITHOUT COMPLICATION, WITH LONG-TERM CURRENT USE OF INSULIN (HCC): ICD-10-CM

## 2021-01-13 NOTE — TELEPHONE ENCOUNTER
Caller: Prema Aldanan    Relationship: Self    Best call back number: [phone]    Medication needed:   Requested Prescriptions     Pending Prescriptions Disp Refills   • pantoprazole (PROTONIX) 40 MG EC tablet 180 tablet 5     Sig: Take 1 tablet by mouth 2 (Two) Times a Day.       When do you need the refill by: 01/13/2020    What details did the patient provide when requesting the medication: HE HAS BEEN OUT OF MEDICATION FOR TWO WEEKS AND NEEDS MEDICATION AS SOON AS POSSIBLE. HE STATED THAT NO ONE HAS CONTACTED HIM IN REGARDS AS TO WHY PROVIDER WILL NOT REFILL MEDICATION AND WOULD LIKE TO BE CONTACTED SINCE HE CALLED IN THIS REFILL REQUEST TWO WEEKS AGO.     Does the patient have less than a 3 day supply:  [x] Yes  [] No    What is the patient's preferred pharmacy: [facility] [address] & [facility] [phone] [facility] [phone] FX

## 2021-01-13 NOTE — TELEPHONE ENCOUNTER
Per Dr. Luna see what dose of tresiba and synjardy that he is on. Per pt, he takes 32 units of tresiba, and  of synjardy.

## 2021-01-15 ENCOUNTER — TRANSCRIBE ORDERS (OUTPATIENT)
Dept: ADMINISTRATIVE | Facility: HOSPITAL | Age: 61
End: 2021-01-15

## 2021-01-15 ENCOUNTER — LAB (OUTPATIENT)
Dept: LAB | Facility: HOSPITAL | Age: 61
End: 2021-01-15

## 2021-01-15 DIAGNOSIS — E06.9 THYROIDITIS: Primary | ICD-10-CM

## 2021-01-15 DIAGNOSIS — I10 ESSENTIAL HYPERTENSION, MALIGNANT: ICD-10-CM

## 2021-01-15 DIAGNOSIS — E78.5 HYPERLIPIDEMIA, UNSPECIFIED HYPERLIPIDEMIA TYPE: Primary | ICD-10-CM

## 2021-01-15 DIAGNOSIS — E06.9 THYROIDITIS: ICD-10-CM

## 2021-01-15 DIAGNOSIS — E78.5 HYPERLIPIDEMIA, UNSPECIFIED HYPERLIPIDEMIA TYPE: ICD-10-CM

## 2021-01-15 LAB
ALBUMIN SERPL-MCNC: 4.3 G/DL (ref 3.5–5.2)
ALBUMIN/GLOB SERPL: 2.3 G/DL
ALP SERPL-CCNC: 67 U/L (ref 39–117)
ALT SERPL W P-5'-P-CCNC: 14 U/L (ref 1–41)
ANION GAP SERPL CALCULATED.3IONS-SCNC: 8.4 MMOL/L (ref 5–15)
AST SERPL-CCNC: 18 U/L (ref 1–40)
BILIRUB SERPL-MCNC: 0.7 MG/DL (ref 0–1.2)
BUN SERPL-MCNC: 17 MG/DL (ref 8–23)
BUN/CREAT SERPL: 17.7 (ref 7–25)
CALCIUM SPEC-SCNC: 9.2 MG/DL (ref 8.6–10.5)
CHLORIDE SERPL-SCNC: 105 MMOL/L (ref 98–107)
CO2 SERPL-SCNC: 26.6 MMOL/L (ref 22–29)
CREAT SERPL-MCNC: 0.96 MG/DL (ref 0.76–1.27)
GFR SERPL CREATININE-BSD FRML MDRD: 80 ML/MIN/1.73
GLOBULIN UR ELPH-MCNC: 1.9 GM/DL
GLUCOSE SERPL-MCNC: 99 MG/DL (ref 65–99)
HBA1C MFR BLD: 8.64 % (ref 4.8–5.6)
POTASSIUM SERPL-SCNC: 4.7 MMOL/L (ref 3.5–5.2)
PROT SERPL-MCNC: 6.2 G/DL (ref 6–8.5)
SODIUM SERPL-SCNC: 140 MMOL/L (ref 136–145)
TSH SERPL DL<=0.05 MIU/L-ACNC: 0.97 UIU/ML (ref 0.27–4.2)

## 2021-01-15 PROCEDURE — 83036 HEMOGLOBIN GLYCOSYLATED A1C: CPT

## 2021-01-15 PROCEDURE — 84681 ASSAY OF C-PEPTIDE: CPT

## 2021-01-15 PROCEDURE — 86376 MICROSOMAL ANTIBODY EACH: CPT

## 2021-01-15 PROCEDURE — 36415 COLL VENOUS BLD VENIPUNCTURE: CPT

## 2021-01-15 PROCEDURE — 84443 ASSAY THYROID STIM HORMONE: CPT

## 2021-01-15 PROCEDURE — 80053 COMPREHEN METABOLIC PANEL: CPT

## 2021-01-15 RX ORDER — EMPAGLIFLOZIN, METFORMIN HYDROCHLORIDE 10; 1000 MG/1; MG/1
1 TABLET, EXTENDED RELEASE ORAL DAILY
Qty: 90 TABLET | Refills: 4 | Status: SHIPPED | OUTPATIENT
Start: 2021-01-15 | End: 2021-11-07 | Stop reason: SDUPTHER

## 2021-01-15 RX ORDER — INSULIN DEGLUDEC INJECTION 100 U/ML
32 INJECTION, SOLUTION SUBCUTANEOUS NIGHTLY
Qty: 5 PEN | Refills: 6 | Status: SHIPPED | OUTPATIENT
Start: 2021-01-15 | End: 2021-08-27 | Stop reason: SDUPTHER

## 2021-01-15 RX ORDER — PANTOPRAZOLE SODIUM 40 MG/1
40 TABLET, DELAYED RELEASE ORAL 2 TIMES DAILY
Qty: 180 TABLET | Refills: 5 | OUTPATIENT
Start: 2021-01-15 | End: 2021-01-22 | Stop reason: SDUPTHER

## 2021-01-16 LAB
C PEPTIDE SERPL-MCNC: 1.1 NG/ML (ref 1.1–4.4)
THYROPEROXIDASE AB SERPL-ACNC: <9 IU/ML (ref 0–34)

## 2021-01-22 ENCOUNTER — OFFICE VISIT (OUTPATIENT)
Dept: INTERNAL MEDICINE | Facility: CLINIC | Age: 61
End: 2021-01-22

## 2021-01-22 VITALS
OXYGEN SATURATION: 98 % | WEIGHT: 197.8 LBS | HEIGHT: 68 IN | HEART RATE: 86 BPM | BODY MASS INDEX: 29.98 KG/M2 | DIASTOLIC BLOOD PRESSURE: 92 MMHG | SYSTOLIC BLOOD PRESSURE: 144 MMHG

## 2021-01-22 DIAGNOSIS — Z79.4 TYPE 2 DIABETES MELLITUS WITHOUT COMPLICATION, WITH LONG-TERM CURRENT USE OF INSULIN (HCC): ICD-10-CM

## 2021-01-22 DIAGNOSIS — Z00.00 ROUTINE ADULT HEALTH MAINTENANCE: Primary | ICD-10-CM

## 2021-01-22 DIAGNOSIS — E11.9 TYPE 2 DIABETES MELLITUS WITHOUT COMPLICATION, WITH LONG-TERM CURRENT USE OF INSULIN (HCC): ICD-10-CM

## 2021-01-22 DIAGNOSIS — E11.65 UNCONTROLLED TYPE 2 DIABETES MELLITUS WITH HYPERGLYCEMIA (HCC): ICD-10-CM

## 2021-01-22 PROCEDURE — 99213 OFFICE O/P EST LOW 20 MIN: CPT | Performed by: FAMILY MEDICINE

## 2021-01-22 PROCEDURE — 90471 IMMUNIZATION ADMIN: CPT | Performed by: FAMILY MEDICINE

## 2021-01-22 PROCEDURE — 90732 PPSV23 VACC 2 YRS+ SUBQ/IM: CPT | Performed by: FAMILY MEDICINE

## 2021-01-22 RX ORDER — LISINOPRIL 5 MG/1
5 TABLET ORAL DAILY
Qty: 90 TABLET | Refills: 4 | Status: SHIPPED | OUTPATIENT
Start: 2021-01-22 | End: 2021-04-23

## 2021-01-22 RX ORDER — ATORVASTATIN CALCIUM 40 MG/1
40 TABLET, FILM COATED ORAL DAILY
Qty: 90 TABLET | Refills: 4 | Status: SHIPPED | OUTPATIENT
Start: 2021-01-22 | End: 2021-08-27 | Stop reason: SDUPTHER

## 2021-01-22 RX ORDER — PANTOPRAZOLE SODIUM 40 MG/1
40 TABLET, DELAYED RELEASE ORAL 2 TIMES DAILY
Qty: 180 TABLET | Refills: 6 | OUTPATIENT
Start: 2021-01-22 | End: 2021-08-27 | Stop reason: SDUPTHER

## 2021-01-22 RX ORDER — NAPROXEN SODIUM 220 MG
220 TABLET ORAL 2 TIMES DAILY PRN
Qty: 90 TABLET | Refills: 4 | Status: SHIPPED | OUTPATIENT
Start: 2021-01-22 | End: 2021-08-27 | Stop reason: SDUPTHER

## 2021-01-22 RX ORDER — FLASH GLUCOSE SENSOR
1 KIT MISCELLANEOUS 3 TIMES DAILY
Qty: 9 EACH | Refills: 9 | Status: SHIPPED | OUTPATIENT
Start: 2021-01-22 | End: 2021-08-27 | Stop reason: SDUPTHER

## 2021-01-22 RX ORDER — ASPIRIN 81 MG/1
81 TABLET ORAL DAILY
Qty: 90 TABLET | Refills: 4 | Status: SHIPPED | OUTPATIENT
Start: 2021-01-22 | End: 2021-08-27 | Stop reason: SDUPTHER

## 2021-01-22 RX ORDER — DULAGLUTIDE 1.5 MG/.5ML
1.5 INJECTION, SOLUTION SUBCUTANEOUS WEEKLY
Qty: 12 PEN | Refills: 10 | Status: SHIPPED | OUTPATIENT
Start: 2021-01-22 | End: 2021-08-30

## 2021-01-22 RX ORDER — SILDENAFIL 25 MG/1
50 TABLET, FILM COATED ORAL DAILY PRN
Qty: 90 TABLET | Refills: 4 | Status: SHIPPED | OUTPATIENT
Start: 2021-01-22 | End: 2021-08-27 | Stop reason: SDUPTHER

## 2021-01-22 RX ORDER — PANTOPRAZOLE SODIUM 40 MG/1
40 TABLET, DELAYED RELEASE ORAL 2 TIMES DAILY
Qty: 180 TABLET | Refills: 5 | Status: CANCELLED | OUTPATIENT
Start: 2021-01-22

## 2021-01-22 RX ORDER — OMEGA-3S/DHA/EPA/FISH OIL/D3 300MG-1000
400 CAPSULE ORAL DAILY
Qty: 90 TABLET | Refills: 4 | Status: SHIPPED | OUTPATIENT
Start: 2021-01-22 | End: 2021-08-27 | Stop reason: SDUPTHER

## 2021-01-22 RX ORDER — EMPAGLIFLOZIN 10 MG/1
10 TABLET, FILM COATED ORAL DAILY
Qty: 90 TABLET | Refills: 4 | Status: SHIPPED | OUTPATIENT
Start: 2021-01-22 | End: 2021-08-30

## 2021-01-22 NOTE — PROGRESS NOTES
01/22/2021    Patient Information  Sadiq Aldana                                                                                          [address]    [place] [address]      Chief Complaint:   Chief Complaint   Patient presents with   • Diabetes     F/U DM, labs done thru endo   • Med Refill     Pt wanted them all rf          History of Present Illness:  Pt is here for DMII check. No complaints. Sugars remain less than 200 all day. Seeing endo for management. Has not seen podiatry nor optometry for yearly DM check.   Pt is requesting refills. No side effects reported.    Review of Systems   Constitution: Negative.   HENT: Negative.    Eyes: Negative.    Cardiovascular: Negative.    Respiratory: Negative.    Endocrine: Negative.    Hematologic/Lymphatic: Negative.    Skin: Negative.    Musculoskeletal: Negative.    Gastrointestinal: Negative.    Genitourinary: Negative.    Neurological: Negative.    Psychiatric/Behavioral: Negative.    Allergic/Immunologic: Negative.        Active Problems:    Patient Active Problem List   Diagnosis   • Type 2 diabetes mellitus without complication, with long-term current use of insulin (CMS/HCC)   • Mixed hyperlipidemia   • Gastroesophageal reflux disease   • Acute left-sided low back pain with left-sided sciatica         Past Medical History:   Diagnosis Date   • Diabetes mellitus (CMS/HCC)    • Erectile dysfunction    • GERD (gastroesophageal reflux disease)    • Hyperlipidemia    • Hypertension    • Peptic ulceration          Past Surgical History:   Procedure Laterality Date   • CHOLECYSTECTOMY     • FRACTURE SURGERY           No Known Allergies        Current Outpatient Medications:   •  aspirin 81 MG EC tablet, Take 1 tablet by mouth Daily., Disp: 90 tablet, Rfl: 4  •  atorvastatin (Lipitor) 40 MG tablet, Take 1 tablet by mouth Daily., Disp: 90 tablet, Rfl: 4  •  cholecalciferol (VITAMIN D3) 10 MCG (400 UNIT) tablet, Take 1 tablet by mouth  Daily., Disp: 90 tablet, Rfl: 4  •  Continuous Blood Gluc Sensor (FreeStyle Eulalio 14 Day Sensor) misc, 1 Units 3 (Three) Times a Day., Disp: 9 each, Rfl: 9  •  Dulaglutide (Trulicity) 1.5 MG/0.5ML solution pen-injector, Inject 1.5 mg under the skin into the appropriate area as directed 1 (One) Time Per Week., Disp: 12 pen, Rfl: 10  •  Empagliflozin-metFORMIN HCl ER (Synjardy XR)  MG tablet sustained-release 24 hour, Take 1 tablet by mouth Daily., Disp: 90 tablet, Rfl: 4  •  Jardiance 10 MG tablet, Take 10 mg by mouth Daily., Disp: 90 tablet, Rfl: 4  •  lisinopril (PRINIVIL,ZESTRIL) 5 MG tablet, Take 1 tablet by mouth Daily., Disp: 90 tablet, Rfl: 4  •  naproxen sodium (ALEVE) 220 MG tablet, Take 1 tablet by mouth 2 (Two) Times a Day As Needed for Mild Pain ., Disp: 90 tablet, Rfl: 4  •  pantoprazole (PROTONIX) 40 MG EC tablet, Take 1 tablet by mouth 2 (Two) Times a Day., Disp: 180 tablet, Rfl: 6  •  sildenafil (VIAGRA) 25 MG tablet, Take 2 tablets by mouth Daily As Needed for Erectile Dysfunction., Disp: 90 tablet, Rfl: 4  •  Tresiba FlexTouch 100 UNIT/ML solution pen-injector injection, Inject 32 Units under the skin into the appropriate area as directed Every Night., Disp: 5 pen, Rfl: 6      Family History   Problem Relation Age of Onset   • Cancer Mother    • Heart disease Mother    • Other Father    • No Known Problems Sister    • Cancer Brother    • No Known Problems Sister    • No Known Problems Sister    • No Known Problems Sister    • Other Sister    • Other Sister          Social History     Socioeconomic History   • Marital status:      Spouse name: Not on file   • Number of children: Not on file   • Years of education: Not on file   • Highest education level: Not on file   Tobacco Use   • Smoking status: Never Smoker   • Smokeless tobacco: Never Used   Substance and Sexual Activity   • Alcohol use: Yes     Comment: monthly   • Drug use: Never   • Sexual activity: Yes     Partners: Female  "        Physical Exam  Constitutional:       Appearance: Normal appearance. He is normal weight.   Cardiovascular:      Rate and Rhythm: Normal rate and regular rhythm.      Pulses: Normal pulses.      Heart sounds: Normal heart sounds.   Pulmonary:      Effort: Pulmonary effort is normal.      Breath sounds: Normal breath sounds.   Skin:     General: Skin is warm.      Findings: No rash.   Neurological:      General: No focal deficit present.      Mental Status: He is alert and oriented to person, place, and time. Mental status is at baseline.   Psychiatric:         Mood and Affect: Mood normal.         Behavior: Behavior normal.         Thought Content: Thought content normal.         Judgment: Judgment normal.          Vitals:    01/22/21 0741 01/22/21 0854   BP: 144/92    BP Location: Right arm    Pulse: 107 86   SpO2: 98%    Weight: 89.7 kg (197 lb 12.8 oz)    Height: 172.7 cm (67.99\")        Body mass index is 30.08 kg/m².       Lab/other results:        Assessment/Plan:    Diagnoses and all orders for this visit:    1. Type 2 diabetes mellitus without complication, with long-term current use of insulin ([facility])  -     pantoprazole (PROTONIX) 40 MG EC tablet; Take 1 tablet by mouth 2 (Two) Times a Day.  Dispense: 180 tablet; Refill: 6  -     lisinopril (PRINIVIL,ZESTRIL) 5 MG tablet; Take 1 tablet by mouth Daily.  Dispense: 90 tablet; Refill: 4  -     Jardiance 10 MG tablet; Take 10 mg by mouth Daily.  Dispense: 90 tablet; Refill: 4  -     Dulaglutide (Trulicity) 1.5 MG/0.5ML solution pen-injector; Inject 1.5 mg under the skin into the appropriate area as directed 1 (One) Time Per Week.  Dispense: 12 pen; Refill: 10  -     Hepatitis C Antibody  -     Ambulatory Referral for Diabetic Eye Exam-Optometry  -     Ambulatory Referral to Podiatry    2. Uncontrolled type 2 diabetes mellitus with hyperglycemia (CMS/HCC)  -     Continuous Blood Gluc Sensor (FreeStyle Eulalio 14 Day Sensor) misc; 1 Units 3 (Three) Times a " Day.  Dispense: 9 each; Refill: 9    Other orders  -     aspirin 81 MG EC tablet; Take 1 tablet by mouth Daily.  Dispense: 90 tablet; Refill: 4  -     atorvastatin (Lipitor) 40 MG tablet; Take 1 tablet by mouth Daily.  Dispense: 90 tablet; Refill: 4  -     cholecalciferol (VITAMIN D3) 10 MCG (400 UNIT) tablet; Take 1 tablet by mouth Daily.  Dispense: 90 tablet; Refill: 4  -     naproxen sodium (ALEVE) 220 MG tablet; Take 1 tablet by mouth 2 (Two) Times a Day As Needed for Mild Pain .  Dispense: 90 tablet; Refill: 4  -     sildenafil (VIAGRA) 25 MG tablet; Take 2 tablets by mouth Daily As Needed for Erectile Dysfunction.  Dispense: 90 tablet; Refill: 4  -     Pneumococcal Polysaccharide Vaccine 23-Valent (PPSV23) Greater Than or Equal To 1yo Subcutaneous / IM       F/u in 3 months for A1c check.

## 2021-03-22 ENCOUNTER — IMMUNIZATION (OUTPATIENT)
Dept: VACCINE CLINIC | Facility: HOSPITAL | Age: 61
End: 2021-03-22

## 2021-03-22 PROCEDURE — 0001A: CPT | Performed by: INTERNAL MEDICINE

## 2021-03-22 PROCEDURE — 91300 HC SARSCOV02 VAC 30MCG/0.3ML IM: CPT | Performed by: INTERNAL MEDICINE

## 2021-04-12 ENCOUNTER — IMMUNIZATION (OUTPATIENT)
Dept: VACCINE CLINIC | Facility: HOSPITAL | Age: 61
End: 2021-04-12

## 2021-04-12 PROCEDURE — 0002A: CPT | Performed by: INTERNAL MEDICINE

## 2021-04-12 PROCEDURE — 91300 HC SARSCOV02 VAC 30MCG/0.3ML IM: CPT | Performed by: INTERNAL MEDICINE

## 2021-04-23 ENCOUNTER — OFFICE VISIT (OUTPATIENT)
Dept: INTERNAL MEDICINE | Facility: CLINIC | Age: 61
End: 2021-04-23

## 2021-04-23 VITALS
WEIGHT: 197 LBS | BODY MASS INDEX: 29.86 KG/M2 | RESPIRATION RATE: 16 BRPM | SYSTOLIC BLOOD PRESSURE: 140 MMHG | TEMPERATURE: 98.1 F | DIASTOLIC BLOOD PRESSURE: 80 MMHG | HEART RATE: 88 BPM | OXYGEN SATURATION: 97 % | HEIGHT: 68 IN

## 2021-04-23 DIAGNOSIS — Z79.4 TYPE 2 DIABETES MELLITUS WITHOUT COMPLICATION, WITH LONG-TERM CURRENT USE OF INSULIN (HCC): Primary | ICD-10-CM

## 2021-04-23 DIAGNOSIS — E11.9 TYPE 2 DIABETES MELLITUS WITHOUT COMPLICATION, WITH LONG-TERM CURRENT USE OF INSULIN (HCC): Primary | ICD-10-CM

## 2021-04-23 DIAGNOSIS — I10 ESSENTIAL HYPERTENSION: ICD-10-CM

## 2021-04-23 PROCEDURE — 99214 OFFICE O/P EST MOD 30 MIN: CPT | Performed by: FAMILY MEDICINE

## 2021-04-23 RX ORDER — EMPAGLIFLOZIN, METFORMIN HYDROCHLORIDE 12.5; 1 MG/1; MG/1
TABLET, EXTENDED RELEASE ORAL
COMMUNITY
Start: 2021-04-17 | End: 2021-08-27 | Stop reason: SDUPTHER

## 2021-04-23 RX ORDER — DULAGLUTIDE 3 MG/.5ML
INJECTION, SOLUTION SUBCUTANEOUS
COMMUNITY
Start: 2021-04-05 | End: 2021-08-27 | Stop reason: SDUPTHER

## 2021-04-23 RX ORDER — LISINOPRIL 10 MG/1
10 TABLET ORAL DAILY
Qty: 90 TABLET | Refills: 4 | Status: SHIPPED | OUTPATIENT
Start: 2021-04-23 | End: 2021-08-27 | Stop reason: SDUPTHER

## 2021-04-23 NOTE — PROGRESS NOTES
04/23/2021    Patient Information  Sadiq Aldana                                                                                          [address] [address]    [place] [address]      Chief Complaint:   Chief Complaint   Patient presents with   • folllow up          History of Present Illness:  Pt is here for DM2 f/u. Reports BGs have been less than 200 to 60s. No side effects reported.  He is Following carb controlled diet  Not exercising.  He titrated his insulin down when his BGs have been in the 60s. No further hypoglycemic episodes. No further concerns/complaints.    Review of Systems   Constitutional: Negative.   HENT: Negative.    Eyes: Negative.    Cardiovascular: Negative.    Respiratory: Negative.    Endocrine: Negative.    Hematologic/Lymphatic: Negative.    Skin: Negative.    Musculoskeletal: Negative.    Gastrointestinal: Negative.    Genitourinary: Negative.    Neurological: Negative.    Psychiatric/Behavioral: Negative.    Allergic/Immunologic: Negative.        Active Problems:    Patient Active Problem List   Diagnosis   • Type 2 diabetes mellitus without complication, with long-term current use of insulin (CMS/HCC)   • Mixed hyperlipidemia   • Gastroesophageal reflux disease   • Acute left-sided low back pain with left-sided sciatica   • Routine adult health maintenance         Past Medical History:   Diagnosis Date   • Diabetes mellitus (CMS/HCC)    • Erectile dysfunction    • GERD (gastroesophageal reflux disease)    • Hyperlipidemia    • Hypertension    • Peptic ulceration          Past Surgical History:   Procedure Laterality Date   • CHOLECYSTECTOMY     • FRACTURE SURGERY           No Known Allergies        Current Outpatient Medications:   •  aspirin 81 MG EC tablet, Take 1 tablet by mouth Daily., Disp: 90 tablet, Rfl: 4  •  atorvastatin (Lipitor) 40 MG tablet, Take 1 tablet by mouth Daily., Disp: 90 tablet, Rfl: 4  •  cholecalciferol (VITAMIN D3) 10 MCG (400 UNIT)  tablet, Take 1 tablet by mouth Daily., Disp: 90 tablet, Rfl: 4  •  Continuous Blood Gluc Sensor (FreeStyle Eulalio 14 Day Sensor) misc, 1 Units 3 (Three) Times a Day., Disp: 9 each, Rfl: 9  •  Dulaglutide (Trulicity) 1.5 MG/0.5ML solution pen-injector, Inject 1.5 mg under the skin into the appropriate area as directed 1 (One) Time Per Week., Disp: 12 pen, Rfl: 10  •  Empagliflozin-metFORMIN HCl ER (Synjardy XR)  MG tablet sustained-release 24 hour, Take 1 tablet by mouth Daily., Disp: 90 tablet, Rfl: 4  •  Jardiance 10 MG tablet, Take 10 mg by mouth Daily., Disp: 90 tablet, Rfl: 4  •  lisinopril (PRINIVIL,ZESTRIL) 5 MG tablet, Take 1 tablet by mouth Daily., Disp: 90 tablet, Rfl: 4  •  naproxen sodium (ALEVE) 220 MG tablet, Take 1 tablet by mouth 2 (Two) Times a Day As Needed for Mild Pain ., Disp: 90 tablet, Rfl: 4  •  pantoprazole (PROTONIX) 40 MG EC tablet, Take 1 tablet by mouth 2 (Two) Times a Day., Disp: 180 tablet, Rfl: 6  •  sildenafil (VIAGRA) 25 MG tablet, Take 2 tablets by mouth Daily As Needed for Erectile Dysfunction., Disp: 90 tablet, Rfl: 4  •  Synjardy XR 12.5-1000 MG tablet sustained-release 24 hour, , Disp: , Rfl:   •  Tresiba FlexTouch 100 UNIT/ML solution pen-injector injection, Inject 32 Units under the skin into the appropriate area as directed Every Night., Disp: 5 pen, Rfl: 6  •  Trulicity 3 MG/0.5ML solution pen-injector, , Disp: , Rfl:       Family History   Problem Relation Age of Onset   • Cancer Mother    • Heart disease Mother    • Other Father    • No Known Problems Sister    • Cancer Brother    • No Known Problems Sister    • No Known Problems Sister    • No Known Problems Sister    • Other Sister    • Other Sister          Social History     Socioeconomic History   • Marital status:      Spouse name: Not on file   • Number of children: Not on file   • Years of education: Not on file   • Highest education level: Not on file   Tobacco Use   • Smoking status: Never Smoker   •  "Smokeless tobacco: Never Used   Vaping Use   • Vaping Use: Never used   Substance and Sexual Activity   • Alcohol use: Yes     Comment: monthly   • Drug use: Never   • Sexual activity: Yes     Partners: Female         Physical Exam     Vitals:    04/23/21 0759   BP: 140/80   Pulse: 88   Resp: 16   Temp: 98.1 °F (36.7 °C)   SpO2: 97%   Weight: 89.4 kg (197 lb)   Height: 172.7 cm (68\")       Body mass index is 29.95 kg/m².       Lab/other results:        Assessment/Plan:    Diagnoses and all orders for this visit:    1. Type 2 diabetes mellitus without complication, with long-term current use of insulin (CMS/[facility]) (Primary)    2. Essential hypertension    HTN--increase lisinopril to 10mg, low sodium diet, exercise. F/u in 6 months. Pt to monitor BP at home.    DM2--controlled, A1c 7.2%. carb controlled diet, exercise, continue meds as directed. Repeat A1c in 6 months.          Procedures          "

## 2021-04-30 RX ORDER — PEN NEEDLE, DIABETIC 32 GX 1/4"
1 NEEDLE, DISPOSABLE MISCELLANEOUS WEEKLY
Qty: 50 EACH | Refills: 4 | Status: SHIPPED | OUTPATIENT
Start: 2021-04-30 | End: 2021-07-29

## 2021-08-27 DIAGNOSIS — E11.9 TYPE 2 DIABETES MELLITUS WITHOUT COMPLICATION, WITH LONG-TERM CURRENT USE OF INSULIN (HCC): ICD-10-CM

## 2021-08-27 DIAGNOSIS — Z79.4 TYPE 2 DIABETES MELLITUS WITHOUT COMPLICATION, WITH LONG-TERM CURRENT USE OF INSULIN (HCC): ICD-10-CM

## 2021-08-27 DIAGNOSIS — E11.65 UNCONTROLLED TYPE 2 DIABETES MELLITUS WITH HYPERGLYCEMIA (HCC): ICD-10-CM

## 2021-08-27 NOTE — TELEPHONE ENCOUNTER
Caller: Sadiq Aldana    Relationship: Self    Best call back number: [phone]  Medication needed:   Requested Prescriptions     Pending Prescriptions Disp Refills   • aspirin 81 MG EC tablet 90 tablet 4     Sig: Take 1 tablet by mouth Daily.   • atorvastatin (Lipitor) 40 MG tablet 90 tablet 4     Sig: Take 1 tablet by mouth Daily.   • cholecalciferol (VITAMIN D3) 10 MCG (400 UNIT) tablet 90 tablet 4     Sig: Take 1 tablet by mouth Daily.   • Continuous Blood Gluc Sensor (FreeStyle Eulalio 14 Day Sensor) misc 9 each 9     Si Units 3 (Three) Times a Day.   • Dulaglutide (Trulicity) 1.5 MG/0.5ML solution pen-injector 12 pen 10     Sig: Inject 1.5 mg under the skin into the appropriate area as directed 1 (One) Time Per Week.   • Empagliflozin-metFORMIN HCl ER (Synjardy XR)  MG tablet sustained-release 24 hour 90 tablet 4     Sig: Take 1 tablet by mouth Daily.   • Jardiance 10 MG tablet tablet 90 tablet 4     Sig: Take 1 tablet by mouth Daily.   • lisinopril (PRINIVIL,ZESTRIL) 10 MG tablet 90 tablet 4     Sig: Take 1 tablet by mouth Daily.   • naproxen sodium (ALEVE) 220 MG tablet 90 tablet 4     Sig: Take 1 tablet by mouth 2 (Two) Times a Day As Needed for Mild Pain .   • pantoprazole (PROTONIX) 40 MG EC tablet 180 tablet 6     Sig: Take 1 tablet by mouth 2 (Two) Times a Day.   • sildenafil (VIAGRA) 25 MG tablet 90 tablet 4     Sig: Take 2 tablets by mouth Daily As Needed for Erectile Dysfunction.   • Synjardy XR 12.5-1000 MG tablet sustained-release 24 hour 30 tablet    • Tresiba FlexTouch 100 UNIT/ML solution pen-injector injection 5 pen 6     Sig: Inject 32 Units under the skin into the appropriate area as directed Every Night.   • Trulicity 3 MG/0.5ML solution pen-injector         When do you need the refill by: ASAP    What additional details did the patient provide when requesting the medication: N/A    Does the patient have less than a 3 day supply:  [] Yes  [x] No    What is the patient's  preferred pharmacy: [facility] [address] [address] [facility] AT [facility] & [facility] [phone] [facility] [phone] FX

## 2021-08-30 RX ORDER — PANTOPRAZOLE SODIUM 40 MG/1
40 TABLET, DELAYED RELEASE ORAL 2 TIMES DAILY
Qty: 180 TABLET | Refills: 6 | OUTPATIENT
Start: 2021-08-30 | End: 2022-05-16 | Stop reason: SDUPTHER

## 2021-08-30 RX ORDER — INSULIN DEGLUDEC INJECTION 100 U/ML
32 INJECTION, SOLUTION SUBCUTANEOUS NIGHTLY
Qty: 5 PEN | Refills: 6 | Status: SHIPPED | OUTPATIENT
Start: 2021-08-30 | End: 2022-05-16 | Stop reason: SDUPTHER

## 2021-08-30 RX ORDER — SILDENAFIL 25 MG/1
50 TABLET, FILM COATED ORAL DAILY PRN
Qty: 90 TABLET | Refills: 4 | Status: SHIPPED | OUTPATIENT
Start: 2021-08-30 | End: 2022-09-16 | Stop reason: SDUPTHER

## 2021-08-30 RX ORDER — ATORVASTATIN CALCIUM 40 MG/1
40 TABLET, FILM COATED ORAL DAILY
Qty: 90 TABLET | Refills: 4 | Status: SHIPPED | OUTPATIENT
Start: 2021-08-30 | End: 2022-05-16 | Stop reason: SDUPTHER

## 2021-08-30 RX ORDER — ASPIRIN 81 MG/1
81 TABLET ORAL DAILY
Qty: 90 TABLET | Refills: 4 | Status: SHIPPED | OUTPATIENT
Start: 2021-08-30

## 2021-08-30 RX ORDER — EMPAGLIFLOZIN, METFORMIN HYDROCHLORIDE 10; 1000 MG/1; MG/1
1 TABLET, EXTENDED RELEASE ORAL DAILY
Qty: 90 TABLET | Refills: 4 | Status: CANCELLED | OUTPATIENT
Start: 2021-08-30

## 2021-08-30 RX ORDER — LISINOPRIL 10 MG/1
10 TABLET ORAL DAILY
Qty: 90 TABLET | Refills: 4 | Status: SHIPPED | OUTPATIENT
Start: 2021-08-30 | End: 2022-01-31 | Stop reason: SDUPTHER

## 2021-08-30 RX ORDER — EMPAGLIFLOZIN 10 MG/1
10 TABLET, FILM COATED ORAL DAILY
Qty: 90 TABLET | Refills: 4 | Status: CANCELLED | OUTPATIENT
Start: 2021-08-30

## 2021-08-30 RX ORDER — DULAGLUTIDE 1.5 MG/.5ML
1.5 INJECTION, SOLUTION SUBCUTANEOUS WEEKLY
Qty: 12 PEN | Refills: 10 | Status: CANCELLED | OUTPATIENT
Start: 2021-08-30

## 2021-08-30 RX ORDER — OMEGA-3S/DHA/EPA/FISH OIL/D3 300MG-1000
400 CAPSULE ORAL DAILY
Qty: 90 TABLET | Refills: 4 | Status: SHIPPED | OUTPATIENT
Start: 2021-08-30

## 2021-08-30 RX ORDER — FLASH GLUCOSE SENSOR
1 KIT MISCELLANEOUS 3 TIMES DAILY
Qty: 9 EACH | Refills: 9 | Status: SHIPPED | OUTPATIENT
Start: 2021-08-30 | End: 2022-05-16 | Stop reason: SDUPTHER

## 2021-08-30 RX ORDER — DULAGLUTIDE 3 MG/.5ML
3 INJECTION, SOLUTION SUBCUTANEOUS WEEKLY
Qty: 6 ML | Refills: 4 | Status: SHIPPED | OUTPATIENT
Start: 2021-08-30 | End: 2021-11-28

## 2021-08-30 RX ORDER — NAPROXEN SODIUM 220 MG
220 TABLET ORAL 2 TIMES DAILY PRN
Qty: 90 TABLET | Refills: 4 | Status: ON HOLD | OUTPATIENT
Start: 2021-08-30 | End: 2023-01-10

## 2021-08-30 RX ORDER — EMPAGLIFLOZIN, METFORMIN HYDROCHLORIDE 12.5; 1 MG/1; MG/1
1 TABLET, EXTENDED RELEASE ORAL DAILY
Qty: 90 TABLET | Refills: 4 | Status: SHIPPED | OUTPATIENT
Start: 2021-08-30 | End: 2021-08-30 | Stop reason: SDUPTHER

## 2021-10-22 ENCOUNTER — OFFICE VISIT (OUTPATIENT)
Dept: INTERNAL MEDICINE | Facility: CLINIC | Age: 61
End: 2021-10-22

## 2021-10-22 VITALS
SYSTOLIC BLOOD PRESSURE: 137 MMHG | OXYGEN SATURATION: 94 % | DIASTOLIC BLOOD PRESSURE: 71 MMHG | RESPIRATION RATE: 16 BRPM | HEIGHT: 68 IN | BODY MASS INDEX: 29.86 KG/M2 | TEMPERATURE: 98 F | HEART RATE: 89 BPM | WEIGHT: 197 LBS

## 2021-10-22 DIAGNOSIS — E11.9 TYPE 2 DIABETES MELLITUS WITHOUT COMPLICATION, WITH LONG-TERM CURRENT USE OF INSULIN (HCC): Primary | ICD-10-CM

## 2021-10-22 DIAGNOSIS — Z00.00 HEALTHCARE MAINTENANCE: ICD-10-CM

## 2021-10-22 DIAGNOSIS — Z79.4 TYPE 2 DIABETES MELLITUS WITHOUT COMPLICATION, WITH LONG-TERM CURRENT USE OF INSULIN (HCC): Primary | ICD-10-CM

## 2021-10-22 PROCEDURE — 99213 OFFICE O/P EST LOW 20 MIN: CPT | Performed by: FAMILY MEDICINE

## 2021-10-22 NOTE — PROGRESS NOTES
10/22/2021    Patient Information  Sadiq Aldana                                                                                          [address] [address]    [place] [address]      Chief Complaint:   Chief Complaint   Patient presents with   • Follow up to diabetes          History of Present Illness:  Patient is here for diabetes. He reports his highest blood sugar since the past 1 month has been 225. Which usually is postprandial and is after breakfast/lunch. Not requesting any refills. No side effects reported.  No further concerns complaints.       Review of Systems   Constitutional: Negative.   HENT: Negative.    Eyes: Negative.    Cardiovascular: Negative.    Respiratory: Negative.    Endocrine: Negative.    Hematologic/Lymphatic: Negative.    Skin: Negative.    Musculoskeletal: Negative.    Gastrointestinal: Negative.    Genitourinary: Negative.    Neurological: Negative.    Psychiatric/Behavioral: Negative.    Allergic/Immunologic: Negative.        Active Problems:    Patient Active Problem List   Diagnosis   • Type 2 diabetes mellitus without complication, with long-term current use of insulin (HCC)   • Mixed hyperlipidemia   • Gastroesophageal reflux disease   • Acute left-sided low back pain with left-sided sciatica   • Routine adult health maintenance         Past Medical History:   Diagnosis Date   • Diabetes mellitus (HCC)    • Erectile dysfunction    • GERD (gastroesophageal reflux disease)    • Hyperlipidemia    • Hypertension    • Peptic ulceration          Past Surgical History:   Procedure Laterality Date   • CHOLECYSTECTOMY     • FRACTURE SURGERY           No Known Allergies        Current Outpatient Medications:   •  aspirin 81 MG EC tablet, Take 1 tablet by mouth Daily., Disp: 90 tablet, Rfl: 4  •  atorvastatin (Lipitor) 40 MG tablet, Take 1 tablet by mouth Daily., Disp: 90 tablet, Rfl: 4  •  cholecalciferol (VITAMIN D3) 10 MCG (400 UNIT) tablet, Take 1 tablet by  mouth Daily., Disp: 90 tablet, Rfl: 4  •  Continuous Blood Gluc Sensor (FreeStyle Eulalio 14 Day Sensor) misc, 1 Units 3 (Three) Times a Day., Disp: 9 each, Rfl: 9  •  Empagliflozin-metFORMIN HCl ER (Synjardy XR)  MG tablet sustained-release 24 hour, Take 1 tablet by mouth Daily., Disp: 90 tablet, Rfl: 4  •  lisinopril (PRINIVIL,ZESTRIL) 10 MG tablet, Take 1 tablet by mouth Daily., Disp: 90 tablet, Rfl: 4  •  naproxen sodium (ALEVE) 220 MG tablet, Take 1 tablet by mouth 2 (Two) Times a Day As Needed for Mild Pain ., Disp: 90 tablet, Rfl: 4  •  pantoprazole (PROTONIX) 40 MG EC tablet, Take 1 tablet by mouth 2 (Two) Times a Day., Disp: 180 tablet, Rfl: 6  •  sildenafil (VIAGRA) 25 MG tablet, Take 2 tablets by mouth Daily As Needed for Erectile Dysfunction., Disp: 90 tablet, Rfl: 4  •  Tresiba FlexTouch 100 UNIT/ML solution pen-injector injection, Inject 32 Units under the skin into the appropriate area as directed Every Night., Disp: 5 pen, Rfl: 6  •  Trulicity 3 MG/0.5ML solution pen-injector, Inject 3 mg under the skin into the appropriate area as directed 1 (One) Time Per Week for 90 days., Disp: 6 mL, Rfl: 4      Family History   Problem Relation Age of Onset   • Cancer Mother    • Heart disease Mother    • Other Father    • No Known Problems Sister    • Cancer Brother    • No Known Problems Sister    • No Known Problems Sister    • No Known Problems Sister    • Other Sister    • Other Sister          Social History     Socioeconomic History   • Marital status:    Tobacco Use   • Smoking status: Never Smoker   • Smokeless tobacco: Never Used   Vaping Use   • Vaping Use: Never used   Substance and Sexual Activity   • Alcohol use: Yes     Comment: monthly   • Drug use: Never   • Sexual activity: Yes     Partners: Female         Physical Exam  Constitutional:       Appearance: Normal appearance.   Cardiovascular:      Rate and Rhythm: Normal rate and regular rhythm.      Pulses: Normal pulses.      Heart  "sounds: Normal heart sounds.   Pulmonary:      Effort: Pulmonary effort is normal.      Breath sounds: Normal breath sounds.   Skin:     General: Skin is warm.      Findings: No rash.   Neurological:      General: No focal deficit present.      Mental Status: He is alert and oriented to person, place, and time. Mental status is at baseline.   Psychiatric:         Mood and Affect: Mood normal.         Behavior: Behavior normal.         Thought Content: Thought content normal.         Judgment: Judgment normal.          Vitals:    10/22/21 0805   BP: 137/71   Pulse: 89   Resp: 16   Temp: 98 °F (36.7 °C)   SpO2: 94%   Weight: 89.4 kg (197 lb)   Height: 172.7 cm (68\")       Body mass index is 29.95 kg/m².       Lab/other results:        Assessment/Plan:    Diagnoses and all orders for this visit:    1. Type 2 diabetes mellitus without complication, with long-term current use of insulin (HCC) (Primary)    2. Healthcare maintenance      DM2--well controlled --last A1c was 7.2% in 04/2021, carb controlled diet, continue current regimen. F/u in 3 months for repeat A1c testing if BGs continue to be over 200.. Monitor BGs at home. Eye/foot exam UTD.     HM--pt refusing any in house vaccines and will get them from outside pharmacy.  Colon cancer screening/labs utd.           Procedures          "

## 2021-11-05 ENCOUNTER — TELEPHONE (OUTPATIENT)
Dept: INTERNAL MEDICINE | Facility: CLINIC | Age: 61
End: 2021-11-05

## 2021-11-05 NOTE — TELEPHONE ENCOUNTER
Caller: Sadiq Aldana    Relationship: Self    Best call back number: [phone]    What medications are you currently taking:   Current Outpatient Medications on File Prior to Visit   Medication Sig Dispense Refill   • aspirin 81 MG EC tablet Take 1 tablet by mouth Daily. 90 tablet 4   • atorvastatin (Lipitor) 40 MG tablet Take 1 tablet by mouth Daily. 90 tablet 4   • cholecalciferol (VITAMIN D3) 10 MCG (400 UNIT) tablet Take 1 tablet by mouth Daily. 90 tablet 4   • Continuous Blood Gluc Sensor (FreeStyle Eulalio 14 Day Sensor) misc 1 Units 3 (Three) Times a Day. 9 each 9   • Empagliflozin-metFORMIN HCl ER (Synjardy XR)  MG tablet sustained-release 24 hour Take 1 tablet by mouth Daily. 90 tablet 4   • lisinopril (PRINIVIL,ZESTRIL) 10 MG tablet Take 1 tablet by mouth Daily. 90 tablet 4   • naproxen sodium (ALEVE) 220 MG tablet Take 1 tablet by mouth 2 (Two) Times a Day As Needed for Mild Pain . 90 tablet 4   • pantoprazole (PROTONIX) 40 MG EC tablet Take 1 tablet by mouth 2 (Two) Times a Day. 180 tablet 6   • sildenafil (VIAGRA) 25 MG tablet Take 2 tablets by mouth Daily As Needed for Erectile Dysfunction. 90 tablet 4   • Tresiba FlexTouch 100 UNIT/ML solution pen-injector injection Inject 32 Units under the skin into the appropriate area as directed Every Night. 5 pen 6   • Trulicity 3 MG/0.5ML solution pen-injector Inject 3 mg under the skin into the appropriate area as directed 1 (One) Time Per Week for 90 days. 6 mL 4     No current facility-administered medications on file prior to visit.        Which medication are you concerned about: Empagliflozin-metFORMIN HCl ER (Synjardy XR)  MG tablet sustained-release 24 hour    What are your concerns: PATIENT HAS ALWAYS TAKEN 2 A DAY OF THESE AND WANTS TO STAY THAT WAY, PRESCRIPTION DOSAGE STATES ONE A DAY. PLEASE CALL PATIENT TO ADVISE WHY THIS WAS CHANGED.

## 2021-11-07 RX ORDER — EMPAGLIFLOZIN, METFORMIN HYDROCHLORIDE 10; 1000 MG/1; MG/1
1 TABLET, EXTENDED RELEASE ORAL 2 TIMES DAILY
Qty: 180 TABLET | Refills: 4 | Status: SHIPPED | OUTPATIENT
Start: 2021-11-07 | End: 2022-01-31 | Stop reason: SDUPTHER

## 2022-01-24 ENCOUNTER — TELEPHONE (OUTPATIENT)
Dept: INTERNAL MEDICINE | Facility: CLINIC | Age: 62
End: 2022-01-24

## 2022-01-24 NOTE — TELEPHONE ENCOUNTER
Hub staff attempted to follow warm transfer process and was unsuccessful     Caller: Sadiq Aldana    Relationship to patient: Self    Best call back number: [phone]     Patient is needing: PATIENT CALLED REGARDING A PRIOR AUTHORIZATION FOR HIS SINJARDY. HIS INSURANCE CHANGED AND THEY WON'T APPROVE 2 PILLS A DAY. HE WAS TOLD THEY HAVE SENT OVER A PRIOR AUTHORIZATION BUT HAVEN'T HEARD BACK FROM THE OFFICE. PATIENT WAS CALLING TO CHECK IF THE OFFICE HAS RECEIVED IT AND IF IT CAN BE SENT BACK. HE IS COMPLETELY OUT OF THIS MEDICATION AND NEEDS IT ASAP.

## 2022-01-31 ENCOUNTER — TELEPHONE (OUTPATIENT)
Dept: INTERNAL MEDICINE | Facility: CLINIC | Age: 62
End: 2022-01-31

## 2022-01-31 DIAGNOSIS — Z79.4 TYPE 2 DIABETES MELLITUS WITHOUT COMPLICATION, WITH LONG-TERM CURRENT USE OF INSULIN: ICD-10-CM

## 2022-01-31 DIAGNOSIS — E11.9 TYPE 2 DIABETES MELLITUS WITHOUT COMPLICATION, WITH LONG-TERM CURRENT USE OF INSULIN: ICD-10-CM

## 2022-01-31 DIAGNOSIS — E11.65 UNCONTROLLED TYPE 2 DIABETES MELLITUS WITH HYPERGLYCEMIA: ICD-10-CM

## 2022-01-31 RX ORDER — EMPAGLIFLOZIN, METFORMIN HYDROCHLORIDE 10; 1000 MG/1; MG/1
1 TABLET, EXTENDED RELEASE ORAL 2 TIMES DAILY
Qty: 90 TABLET | Refills: 4 | Status: SHIPPED | OUTPATIENT
Start: 2022-01-31 | End: 2022-02-14 | Stop reason: SDUPTHER

## 2022-01-31 RX ORDER — DULAGLUTIDE 3 MG/.5ML
1 INJECTION, SOLUTION SUBCUTANEOUS WEEKLY
Qty: 2 ML | Refills: 3 | Status: SHIPPED | OUTPATIENT
Start: 2022-01-31 | End: 2022-02-18 | Stop reason: SDUPTHER

## 2022-01-31 RX ORDER — LISINOPRIL 10 MG/1
10 TABLET ORAL DAILY
Qty: 90 TABLET | Refills: 3 | Status: SHIPPED | OUTPATIENT
Start: 2022-01-31 | End: 2022-05-16 | Stop reason: SDUPTHER

## 2022-01-31 RX ORDER — DULAGLUTIDE 3 MG/.5ML
INJECTION, SOLUTION SUBCUTANEOUS
COMMUNITY
Start: 2022-01-19 | End: 2022-01-31 | Stop reason: SDUPTHER

## 2022-01-31 NOTE — TELEPHONE ENCOUNTER
PATIENT STATES THAT INSURANCE TOLD HIM THAT IF THE PROVIDER CAN DO A TEMPORAY CHANGE OF 1 TAB PER DAY THEY CAN SEND THRU IMMEDIATELY. PATIENT HAS BEEN OUT FOR TWO WEEKS. PLEASE ADVISE.

## 2022-01-31 NOTE — TELEPHONE ENCOUNTER
Caller: MelaniPrema dealn    Relationship: Self    Best call back number: [phone]    What medication are you requesting: TRULICITY  3 ML ONCE A WEEK      Have you had these symptoms before:    [x] Yes  [] No    Have you been treated for these symptoms before:   [x] Yes  [] No    If a prescription is needed, what is your preferred [facility] and [facility] [id number] [place] [address] AT [facility] [phone] [facility] [phone] FX     Additional notes: PATIENT HAS TWO WEEKS LEFT. NOT ON ACTIVE MED LIST.

## 2022-02-08 ENCOUNTER — TELEPHONE (OUTPATIENT)
Dept: INTERNAL MEDICINE | Facility: CLINIC | Age: 62
End: 2022-02-08

## 2022-02-08 NOTE — TELEPHONE ENCOUNTER
Caller: Sadiq Aldana    Relationship to patient: Self    Best call back number: [phone]    Patient is needing: PT IS NEEDING ANEW WRITTEN PRESCRIPTION FOR Empagliflozin-metFORMIN HCl ER (Synjardy XR)  MG tablet sustained-release 24 hour HE STATED THAT THE MED WAS TO BE 1CE A DAY AND NOT 2CE A DAY AND THE MG NEEDS TO BE INCREASED, PLEASE. HE STATED THAT HE IS ALSO NEEDING SAMPLES OF MED DUE TO HIM RUNNING OUT SOON AND MED BEING EXPENSIVE SINCE IT IS WRITTEN OUT TO TWICE A DAY.  STATED THAT HE CAN  WRITTEN PRESCRIPTION WHEN READY. PLEASE CALL PT WHEN POSSIBLE.

## 2022-02-14 RX ORDER — EMPAGLIFLOZIN, METFORMIN HYDROCHLORIDE 10; 1000 MG/1; MG/1
1 TABLET, EXTENDED RELEASE ORAL DAILY
Qty: 90 TABLET | Refills: 2 | Status: SHIPPED | OUTPATIENT
Start: 2022-02-14 | End: 2022-03-04 | Stop reason: SDUPTHER

## 2022-02-18 ENCOUNTER — TELEPHONE (OUTPATIENT)
Dept: INTERNAL MEDICINE | Facility: CLINIC | Age: 62
End: 2022-02-18

## 2022-02-18 RX ORDER — DULAGLUTIDE 3 MG/.5ML
0.5 INJECTION, SOLUTION SUBCUTANEOUS WEEKLY
Qty: 2 ML | Refills: 3 | Status: SHIPPED | OUTPATIENT
Start: 2022-02-18 | End: 2022-05-16 | Stop reason: SDUPTHER

## 2022-02-18 NOTE — TELEPHONE ENCOUNTER
Please advise directions for Trulicity - [employer] called questioning directions - inject 1 ml as directed once weekly since it comes in a 0.5 ml pen.

## 2022-03-04 ENCOUNTER — OFFICE VISIT (OUTPATIENT)
Dept: FAMILY MEDICINE CLINIC | Facility: CLINIC | Age: 62
End: 2022-03-04

## 2022-03-04 VITALS
HEIGHT: 68 IN | BODY MASS INDEX: 28.34 KG/M2 | RESPIRATION RATE: 18 BRPM | OXYGEN SATURATION: 97 % | WEIGHT: 187 LBS | HEART RATE: 81 BPM | DIASTOLIC BLOOD PRESSURE: 80 MMHG | SYSTOLIC BLOOD PRESSURE: 130 MMHG | TEMPERATURE: 97.1 F

## 2022-03-04 DIAGNOSIS — E11.9 TYPE 2 DIABETES MELLITUS WITHOUT COMPLICATION, WITH LONG-TERM CURRENT USE OF INSULIN: ICD-10-CM

## 2022-03-04 DIAGNOSIS — I10 PRIMARY HYPERTENSION: Primary | ICD-10-CM

## 2022-03-04 DIAGNOSIS — E78.2 MIXED HYPERLIPIDEMIA: ICD-10-CM

## 2022-03-04 DIAGNOSIS — Z79.4 TYPE 2 DIABETES MELLITUS WITHOUT COMPLICATION, WITH LONG-TERM CURRENT USE OF INSULIN: ICD-10-CM

## 2022-03-04 PROBLEM — Z96.1 ARTIFICIAL LENS PRESENT: Status: ACTIVE | Noted: 2021-05-20

## 2022-03-04 PROCEDURE — 99213 OFFICE O/P EST LOW 20 MIN: CPT | Performed by: NURSE PRACTITIONER

## 2022-03-04 RX ORDER — EMPAGLIFLOZIN, METFORMIN HYDROCHLORIDE 10; 1000 MG/1; MG/1
1 TABLET, EXTENDED RELEASE ORAL DAILY
Qty: 90 TABLET | Refills: 0 | Status: SHIPPED | OUTPATIENT
Start: 2022-03-04 | End: 2022-05-16 | Stop reason: SDUPTHER

## 2022-03-04 NOTE — PATIENT INSTRUCTIONS
Pt stating he needs PA for his Synjardy refills.   Recommend pt to eat a heart healthy diabetic diet; discussed DM education but pt declined; recommend pt to drink plenty of water with goal 64 oz a day, stop sodas and monster drinks; recommend exercise/walking 3-5 times a week, for more than 30 minutes at a time

## 2022-03-04 NOTE — PROGRESS NOTES
"Vernon Aldana is a 61 y.o.. male.     Pt here today to become an established. Pt here with medical problem list that includes HTN, HLD, DM, Gerd, ED, and MIGUEL (does not use cpap/bipap, has had surgery for in past).   Pt stating he eat healthy sometimes; drinks water, soda, and monster drinks. Pt stating he exercises by walking. Pt stating he was dx with DM when he was 26-27. Pt stating his last DM eye exam was 1 year ago as well as his last DM foot eval was 1 year ago. Pt stating he moved from [place] up to [place] and before that he lived in [place].      The following portions of the patient's history were reviewed and updated as appropriate: allergies, current medications, past family history, past medical history, past social history, past surgical history and problem list.    Past Medical History:   Diagnosis Date   • Diabetes mellitus (HCC)    • Erectile dysfunction    • GERD (gastroesophageal reflux disease)    • Hyperlipidemia    • Hypertension    • Peptic ulceration        Past Surgical History:   Procedure Laterality Date   • CHOLECYSTECTOMY     • FRACTURE SURGERY     • SHOULDER ARTHROSCOPY Bilateral     removal of bone spurs       Review of Systems   Constitutional: Negative.    Respiratory: Negative.    Cardiovascular: Negative.        No Known Allergies    Objective     Vitals:    03/04/22 1311   BP: 130/80   Pulse: 81   Resp: 18   Temp: 97.1 °F (36.2 °C)   TempSrc: Oral   SpO2: 97%   Weight: 84.8 kg (187 lb)   Height: 172.7 cm (67.99\")     Body mass index is 28.44 kg/m².    Physical Exam  Vitals reviewed.   HENT:      Head: Normocephalic.   Eyes:      Pupils: Pupils are equal, round, and reactive to light.   Neck:      Vascular: No carotid bruit.   Cardiovascular:      Rate and Rhythm: Normal rate and regular rhythm.      Pulses: Normal pulses.   Pulmonary:      Effort: Pulmonary effort is normal.      Breath sounds: Normal breath sounds.   Musculoskeletal:      Right lower leg: " Edema (trace) present.      Left lower leg: Edema (trace) present.   Neurological:      Mental Status: He is alert and oriented to person, place, and time.   Psychiatric:         Behavior: Behavior normal.           Current Outpatient Medications:   •  aspirin 81 MG EC tablet, Take 1 tablet by mouth Daily., Disp: 90 tablet, Rfl: 4  •  atorvastatin (Lipitor) 40 MG tablet, Take 1 tablet by mouth Daily., Disp: 90 tablet, Rfl: 4  •  cholecalciferol (VITAMIN D3) 10 MCG (400 UNIT) tablet, Take 1 tablet by mouth Daily., Disp: 90 tablet, Rfl: 4  •  Continuous Blood Gluc Sensor (FreeStyle Eulalio 14 Day Sensor) misc, 1 Units 3 (Three) Times a Day., Disp: 9 each, Rfl: 9  •  Empagliflozin-metFORMIN HCl ER (Synjardy XR)  MG tablet sustained-release 24 hour, Take 1 tablet by mouth Daily., Disp: 90 tablet, Rfl: 0  •  lisinopril (PRINIVIL,ZESTRIL) 10 MG tablet, Take 1 tablet by mouth Daily., Disp: 90 tablet, Rfl: 3  •  naproxen sodium (ALEVE) 220 MG tablet, Take 1 tablet by mouth 2 (Two) Times a Day As Needed for Mild Pain ., Disp: 90 tablet, Rfl: 4  •  pantoprazole (PROTONIX) 40 MG EC tablet, Take 1 tablet by mouth 2 (Two) Times a Day., Disp: 180 tablet, Rfl: 6  •  sildenafil (VIAGRA) 25 MG tablet, Take 2 tablets by mouth Daily As Needed for Erectile Dysfunction., Disp: 90 tablet, Rfl: 4  •  Tresiba FlexTouch 100 UNIT/ML solution pen-injector injection, Inject 32 Units under the skin into the appropriate area as directed Every Night., Disp: 5 pen, Rfl: 6  •  Trulicity 3 MG/0.5ML solution pen-injector, Inject 0.5 mL as directed 1 (One) Time Per Week., Disp: 2 mL, Rfl: 3      Assessment/Plan   Diagnoses and all orders for this visit:    1. Primary hypertension (Primary)  -     CBC & Differential; Future  -     Comprehensive Metabolic Panel; Future  -     Urinalysis With Culture If Indicated -; Future  -     TSH; Future    2. Type 2 diabetes mellitus without complication, with long-term current use of insulin (HCC)  -      Empagliflozin-metFORMIN HCl ER (Synjardy XR)  MG tablet sustained-release 24 hour; Take 1 tablet by mouth Daily.  Dispense: 90 tablet; Refill: 0  -     Hemoglobin A1c; Future  -     MicroAlbumin, Urine, Random - Urine, Clean Catch; Future    3. Mixed hyperlipidemia  -     Lipid Panel With LDL / HDL Ratio; Future        Patient Instructions   Pt stating he needs PA for his Synjardy refills.   Recommend pt to eat a heart healthy diabetic diet; discussed DM education but pt declined; recommend pt to drink plenty of water with goal 64 oz a day, stop sodas and monster drinks; recommend exercise/walking 3-5 times a week, for more than 30 minutes at a time        Return for follow up in April for fasting labs and then physical.

## 2022-03-29 ENCOUNTER — TELEPHONE (OUTPATIENT)
Dept: FAMILY MEDICINE CLINIC | Facility: CLINIC | Age: 62
End: 2022-03-29

## 2022-03-29 NOTE — TELEPHONE ENCOUNTER
Caller: Sadiq Aldana    Relationship to patient: Self    Best call back number: [phone]    Patient is needing: PATIENT STATES HE LEFT THE SAMPLES SYNJARDY HE WAS GIVEN IN THE LOBBY ON 3-28 AND WAS WONDERING IF THEY ARE STILL THERE. IF SO PLEASE LET PATIENT KNOW WHEN HE CAN COME GRAB THOSE.

## 2022-03-30 NOTE — TELEPHONE ENCOUNTER
Spoke with patient. His samples were recovered from the waiting room. I have placed them in our cabinet for . Patient will stop by and get them later today. Nothing further needed at this time.      Robles

## 2022-05-16 DIAGNOSIS — E11.65 UNCONTROLLED TYPE 2 DIABETES MELLITUS WITH HYPERGLYCEMIA: ICD-10-CM

## 2022-05-16 DIAGNOSIS — E11.9 TYPE 2 DIABETES MELLITUS WITHOUT COMPLICATION, WITH LONG-TERM CURRENT USE OF INSULIN: ICD-10-CM

## 2022-05-16 DIAGNOSIS — Z79.4 TYPE 2 DIABETES MELLITUS WITHOUT COMPLICATION, WITH LONG-TERM CURRENT USE OF INSULIN: ICD-10-CM

## 2022-05-16 RX ORDER — PANTOPRAZOLE SODIUM 40 MG/1
40 TABLET, DELAYED RELEASE ORAL 2 TIMES DAILY
Qty: 180 TABLET | Refills: 0 | OUTPATIENT
Start: 2022-05-16 | End: 2022-06-01 | Stop reason: SDUPTHER

## 2022-05-16 RX ORDER — LISINOPRIL 10 MG/1
10 TABLET ORAL DAILY
Qty: 90 TABLET | Refills: 0 | Status: SHIPPED | OUTPATIENT
Start: 2022-05-16 | End: 2022-08-19 | Stop reason: SDUPTHER

## 2022-05-16 RX ORDER — EMPAGLIFLOZIN, METFORMIN HYDROCHLORIDE 10; 1000 MG/1; MG/1
1 TABLET, EXTENDED RELEASE ORAL DAILY
Qty: 90 TABLET | Refills: 0 | Status: SHIPPED | OUTPATIENT
Start: 2022-05-16 | End: 2022-07-06 | Stop reason: SDUPTHER

## 2022-05-16 RX ORDER — ATORVASTATIN CALCIUM 40 MG/1
40 TABLET, FILM COATED ORAL DAILY
Qty: 90 TABLET | Refills: 0 | Status: SHIPPED | OUTPATIENT
Start: 2022-05-16 | End: 2022-08-19 | Stop reason: SDUPTHER

## 2022-05-16 RX ORDER — FLASH GLUCOSE SENSOR
1 KIT MISCELLANEOUS 3 TIMES DAILY
Qty: 9 EACH | Refills: 9 | Status: SHIPPED | OUTPATIENT
Start: 2022-05-16 | End: 2022-08-19 | Stop reason: SDUPTHER

## 2022-05-16 RX ORDER — DULAGLUTIDE 3 MG/.5ML
0.5 INJECTION, SOLUTION SUBCUTANEOUS WEEKLY
Qty: 2 ML | Refills: 3 | Status: SHIPPED | OUTPATIENT
Start: 2022-05-16 | End: 2022-08-19 | Stop reason: SDUPTHER

## 2022-05-16 RX ORDER — INSULIN DEGLUDEC INJECTION 100 U/ML
32 INJECTION, SOLUTION SUBCUTANEOUS NIGHTLY
Qty: 5 PEN | Refills: 6 | Status: SHIPPED | OUTPATIENT
Start: 2022-05-16 | End: 2022-08-19 | Stop reason: SDUPTHER

## 2022-05-16 NOTE — TELEPHONE ENCOUNTER
Caller: Sadiq Aldana    Relationship: Self    Best call back number: [phone]    Requested Prescriptions:   Requested Prescriptions     Pending Prescriptions Disp Refills   • Empagliflozin-metFORMIN HCl ER (Synjardy XR)  MG tablet sustained-release 24 hour 90 tablet 0     Sig: Take 1 tablet by mouth Daily.   • Trulicity 3 MG/0.5ML solution pen-injector 2 mL 3     Sig: Inject 0.5 mL as directed 1 (One) Time Per Week.   • Tresiba FlexTouch 100 UNIT/ML solution pen-injector injection 5 pen 6     Sig: Inject 32 Units under the skin into the appropriate area as directed Every Night.   • atorvastatin (Lipitor) 40 MG tablet 90 tablet 4     Sig: Take 1 tablet by mouth Daily.   • pantoprazole (PROTONIX) 40 MG EC tablet 180 tablet 6     Sig: Take 1 tablet by mouth 2 (Two) Times a Day.   • lisinopril (PRINIVIL,ZESTRIL) 10 MG tablet 90 tablet 3     Sig: Take 1 tablet by mouth Daily.   • Continuous Blood Gluc Sensor (FreeStyle Eulalio 14 Day Sensor) misc 9 each 9     Si Units 3 (Three) Times a Day.        Pharmacy where request should be sent: [facility] [address] AT [facility] & [facility] - [phone] [facility] [phone] [facility]       Does the patient have less than a 3 day supply:  [x] Yes  [] No    Paula Melara, Tommie Rep   22 08:49 EDT

## 2022-05-16 NOTE — TELEPHONE ENCOUNTER
Ofelia,      Please review and refill if appropriate. Let me know if anything additional is needed.      Thanks,  Robles      Next OV 06/02/2022  Last OV 03/04/2022

## 2022-06-01 DIAGNOSIS — K21.9 GASTROESOPHAGEAL REFLUX DISEASE, UNSPECIFIED WHETHER ESOPHAGITIS PRESENT: Primary | ICD-10-CM

## 2022-06-01 DIAGNOSIS — K21.9 GASTROESOPHAGEAL REFLUX DISEASE, UNSPECIFIED WHETHER ESOPHAGITIS PRESENT: ICD-10-CM

## 2022-06-01 RX ORDER — PANTOPRAZOLE SODIUM 40 MG/1
40 TABLET, DELAYED RELEASE ORAL 2 TIMES DAILY
Qty: 180 TABLET | Refills: 0 | OUTPATIENT
Start: 2022-06-01 | End: 2022-06-01 | Stop reason: SDUPTHER

## 2022-06-01 RX ORDER — PANTOPRAZOLE SODIUM 40 MG/1
40 TABLET, DELAYED RELEASE ORAL 2 TIMES DAILY
Qty: 180 TABLET | Refills: 0 | Status: SHIPPED | OUTPATIENT
Start: 2022-06-01 | End: 2022-08-19 | Stop reason: SDUPTHER

## 2022-06-02 ENCOUNTER — OFFICE VISIT (OUTPATIENT)
Dept: FAMILY MEDICINE CLINIC | Facility: CLINIC | Age: 62
End: 2022-06-02

## 2022-06-02 VITALS
OXYGEN SATURATION: 96 % | SYSTOLIC BLOOD PRESSURE: 120 MMHG | HEIGHT: 68 IN | DIASTOLIC BLOOD PRESSURE: 70 MMHG | WEIGHT: 185 LBS | TEMPERATURE: 96.8 F | RESPIRATION RATE: 18 BRPM | HEART RATE: 94 BPM | BODY MASS INDEX: 28.04 KG/M2

## 2022-06-02 DIAGNOSIS — Z11.59 NEED FOR HEPATITIS C SCREENING TEST: ICD-10-CM

## 2022-06-02 DIAGNOSIS — E11.9 TYPE 2 DIABETES MELLITUS WITHOUT COMPLICATION, WITH LONG-TERM CURRENT USE OF INSULIN: ICD-10-CM

## 2022-06-02 DIAGNOSIS — Z12.5 PROSTATE CANCER SCREENING: ICD-10-CM

## 2022-06-02 DIAGNOSIS — Z79.4 TYPE 2 DIABETES MELLITUS WITHOUT COMPLICATION, WITH LONG-TERM CURRENT USE OF INSULIN: ICD-10-CM

## 2022-06-02 DIAGNOSIS — Z00.00 ANNUAL PHYSICAL EXAM: Primary | ICD-10-CM

## 2022-06-02 DIAGNOSIS — E11.9 TYPE 2 DIABETES MELLITUS WITHOUT COMPLICATION, WITH LONG-TERM CURRENT USE OF INSULIN: Primary | ICD-10-CM

## 2022-06-02 DIAGNOSIS — Z79.4 TYPE 2 DIABETES MELLITUS WITHOUT COMPLICATION, WITH LONG-TERM CURRENT USE OF INSULIN: Primary | ICD-10-CM

## 2022-06-02 DIAGNOSIS — B35.1 ONYCHOMYCOSIS: ICD-10-CM

## 2022-06-02 PROBLEM — I10 HYPERTENSION: Status: ACTIVE | Noted: 2022-06-02

## 2022-06-02 PROCEDURE — 93000 ELECTROCARDIOGRAM COMPLETE: CPT | Performed by: NURSE PRACTITIONER

## 2022-06-02 PROCEDURE — 99396 PREV VISIT EST AGE 40-64: CPT | Performed by: NURSE PRACTITIONER

## 2022-06-02 NOTE — PATIENT INSTRUCTIONS
DM: HgA1c went from 8.64 to 8; minor neuropathy noted to left heel. Continue Recommend pt to eat a heart healthy diabetic diet, to drink plenty of water with goal 64 oz a day, stop sodas and monster drinks, to exercise/walking 3-5 times a week, for more than 30 minutes at a time. Continue synjardy XR  mg 1 tab daily,  tresiba 32 units SQ every night, Trulicity 3 mg once per week. Follow up in 6 months. May need to increase trulicity or synjardy XR in 6 months if plateau or worsening with HgA1c seen.     Onychomycosis: will start with topical treatment, if this does not help with re-discuss oral medications. Use ciclopirox topical as prescribed.

## 2022-06-02 NOTE — PROGRESS NOTES
Vernon Aldana is a 61 y.o. male. Patient is here today for   Chief Complaint   Patient presents with   • Annual Exam     PT HERE FOR CPE          Vitals:    06/02/22 0814   BP: 120/70   Pulse: 94   Resp: 18   Temp: 96.8 °F (36 °C)   SpO2: 96%     Body mass index is 28.14 kg/m².    The following portions of the patient's history were reviewed and updated as appropriate: allergies, current medications, past family history, past medical history, past social history, past surgical history and problem list.    Past Medical History:   Diagnosis Date   • Diabetes mellitus (HCC)    • Erectile dysfunction    • GERD (gastroesophageal reflux disease)    • Hyperlipidemia    • Hypertension    • Obstructive sleep apnea syndrome    • Peptic ulceration       No Known Allergies   Social History     Socioeconomic History   • Marital status:    Tobacco Use   • Smoking status: Never Smoker   • Smokeless tobacco: Never Used   Vaping Use   • Vaping Use: Never used   Substance and Sexual Activity   • Alcohol use: Yes     Comment: monthly; wine/beer once a month   • Drug use: Never   • Sexual activity: Yes     Partners: Female        Current Outpatient Medications:   •  aspirin 81 MG EC tablet, Take 1 tablet by mouth Daily., Disp: 90 tablet, Rfl: 4  •  atorvastatin (Lipitor) 40 MG tablet, Take 1 tablet by mouth Daily., Disp: 90 tablet, Rfl: 0  •  cholecalciferol (VITAMIN D3) 10 MCG (400 UNIT) tablet, Take 1 tablet by mouth Daily., Disp: 90 tablet, Rfl: 4  •  Continuous Blood Gluc Sensor (FreeStyle Eulalio 14 Day Sensor) misc, 1 Units 3 (Three) Times a Day., Disp: 9 each, Rfl: 9  •  Empagliflozin-metFORMIN HCl ER (Synjardy XR)  MG tablet sustained-release 24 hour, Take 1 tablet by mouth Daily., Disp: 90 tablet, Rfl: 0  •  lisinopril (PRINIVIL,ZESTRIL) 10 MG tablet, Take 1 tablet by mouth Daily., Disp: 90 tablet, Rfl: 0  •  naproxen sodium (ALEVE) 220 MG tablet, Take 1 tablet by mouth 2 (Two) Times a Day As Needed  for Mild Pain ., Disp: 90 tablet, Rfl: 4  •  pantoprazole (PROTONIX) 40 MG EC tablet, Take 1 tablet by mouth 2 (Two) Times a Day., Disp: 180 tablet, Rfl: 0  •  sildenafil (VIAGRA) 25 MG tablet, Take 2 tablets by mouth Daily As Needed for Erectile Dysfunction., Disp: 90 tablet, Rfl: 4  •  Tresiba FlexTouch 100 UNIT/ML solution pen-injector injection, Inject 32 Units under the skin into the appropriate area as directed Every Night., Disp: 5 pen, Rfl: 6  •  Trulicity 3 MG/0.5ML solution pen-injector, Inject 0.5 mL as directed 1 (One) Time Per Week., Disp: 2 mL, Rfl: 3  •  ciclopirox (LOPROX) 0.77 % cream, Apply 1 application topically to the appropriate area as directed 2 (Two) Times a Day. Apply to affected nails once daily, for best results remove with acetone every 7 days and repeat applications, Disp: 30 g, Rfl: 1     EKG in office 07:44: SR, vent rate 95, RI int 130, QRS 96    Objective   Pt here today for annual physical exam. Pt stating his PA for synjardy still has not come through. Pt denies any other issues/concerns.      Pt stating he moved from [place] up to [place] and before that he lived in [place].       Sadiq Aldana 61 y.o. male who presents for an Annual Wellness Visit.  he has a history of   Patient Active Problem List   Diagnosis   • Type 2 diabetes mellitus without complication, with long-term current use of insulin ([facility])   • Mixed hyperlipidemia   • Gastroesophageal reflux disease   • Acute left-sided low back pain with left-sided sciatica   • Routine adult health maintenance   • Artificial lens present   • Onychomycosis   • Hypertension   .  he has been doing well with new interval problems.  Labs results discussed in detail with the patient.  Plan to update vaccines if needed today.    Health Habits:  Dental Exam. up to date  Eye Exam. up to date; 1 YEAR AGO  Exercise: 5 times/week.  Current exercise activities include: walking  Pt stating he eat healthy sometimes; drinks water, soda,  and monster drinks.     Recent Results (from the past 336 hour(s))   CBC & Differential    Collection Time: 05/27/22  9:27 AM    Specimen: Blood   Result Value Ref Range    WBC 6.9 3.4 - 10.8 x10E3/uL    RBC 5.03 4.14 - 5.80 x10E6/uL    Hemoglobin 15.0 13.0 - 17.7 g/dL    Hematocrit 45.2 37.5 - 51.0 %    MCV 90 79 - 97 fL    MCH 29.8 26.6 - 33.0 pg    MCHC 33.2 31.5 - 35.7 g/dL    RDW 12.3 11.6 - 15.4 %    Platelets 315 150 - 450 x10E3/uL    Neutrophil Rel % 68 Not Estab. %    Lymphocyte Rel % 23 Not Estab. %    Monocyte Rel % 7 Not Estab. %    Eosinophil Rel % 1 Not Estab. %    Basophil Rel % 1 Not Estab. %    Neutrophils Absolute 4.7 1.4 - 7.0 x10E3/uL    Lymphocytes Absolute 1.6 0.7 - 3.1 x10E3/uL    Monocytes Absolute 0.5 0.1 - 0.9 x10E3/uL    Eosinophils Absolute 0.1 0.0 - 0.4 x10E3/uL    Basophils Absolute 0.1 0.0 - 0.2 x10E3/uL    Immature Granulocyte Rel % 0 Not Estab. %    Immature Grans Absolute 0.0 0.0 - 0.1 x10E3/uL   Lipid Panel With LDL / HDL Ratio    Collection Time: 05/27/22  9:27 AM    Specimen: Blood   Result Value Ref Range    Total Cholesterol 158 100 - 199 mg/dL    Triglycerides 93 0 - 149 mg/dL    HDL Cholesterol 49 >39 mg/dL    VLDL Cholesterol Mookie 17 5 - 40 mg/dL    LDL Chol Calc (NIH) 92 0 - 99 mg/dL    LDL/HDL RATIO 1.9 0.0 - 3.6 ratio   Comprehensive Metabolic Panel    Collection Time: 05/27/22  9:27 AM    Specimen: Blood   Result Value Ref Range    Glucose 103 (H) 65 - 99 mg/dL    BUN 21 8 - 27 mg/dL    Creatinine 0.99 0.76 - 1.27 mg/dL    EGFR Result 87 >59 mL/min/1.73    BUN/Creatinine Ratio 21 10 - 24    Sodium 140 134 - 144 mmol/L    Potassium 4.6 3.5 - 5.2 mmol/L    Chloride 101 96 - 106 mmol/L    Total CO2 23 20 - 29 mmol/L    Calcium 9.5 8.6 - 10.2 mg/dL    Total Protein 6.2 6.0 - 8.5 g/dL    Albumin 4.5 3.8 - 4.8 g/dL    Globulin 1.7 1.5 - 4.5 g/dL    A/G Ratio 2.6 (H) 1.2 - 2.2    Total Bilirubin 0.6 0.0 - 1.2 mg/dL    Alkaline Phosphatase 61 44 - 121 IU/L    AST (SGOT) 17 0 - 40  IU/L    ALT (SGPT) 13 0 - 44 IU/L   Hemoglobin A1c    Collection Time: 05/27/22  9:27 AM    Specimen: Blood   Result Value Ref Range    Hemoglobin A1C 8.0 (H) 4.8 - 5.6 %   TSH    Collection Time: 05/27/22  9:27 AM    Specimen: Blood   Result Value Ref Range    TSH 1.060 0.450 - 4.500 uIU/mL   Unable To Void    Collection Time: 05/27/22  9:27 AM   Result Value Ref Range    Unable to Void Comment          Review of Systems   Constitutional: Negative.    HENT: Negative.    Respiratory: Negative.    Cardiovascular: Negative.    Gastrointestinal: Negative.    Genitourinary: Negative.    Musculoskeletal: Negative.    Neurological: Negative.        Physical Exam  Constitutional:       Appearance: Normal appearance. He is well-developed.   HENT:      Head: Normocephalic and atraumatic.      Right Ear: Tympanic membrane normal. Tympanic membrane is not erythematous.      Left Ear: Tympanic membrane normal. Tympanic membrane is not erythematous.      Nose: Nose normal.   Eyes:      Conjunctiva/sclera: Conjunctivae normal.      Pupils: Pupils are equal, round, and reactive to light.   Neck:      Thyroid: No thyromegaly.      Vascular: No carotid bruit.      Trachea: No tracheal deviation.   Cardiovascular:      Rate and Rhythm: Normal rate and regular rhythm.      Pulses: Normal pulses.      Heart sounds: Normal heart sounds. No murmur heard.  Pulmonary:      Effort: No accessory muscle usage or respiratory distress.      Breath sounds: Normal breath sounds. No stridor. No decreased breath sounds, wheezing, rhonchi or rales.   Abdominal:      General: Bowel sounds are normal. There is no distension.      Palpations: Abdomen is soft. Abdomen is not rigid. There is no mass.      Tenderness: There is no abdominal tenderness. There is no guarding or rebound.      Hernia: No hernia is present.   Musculoskeletal:         General: Normal range of motion.      Cervical back: Normal range of motion and neck supple.   Feet:      Right  foot:      Toenail Condition: Right toenails are abnormally thick and ingrown. Fungal disease present.     Left foot:      Toenail Condition: Left toenails are abnormally thick and ingrown. Fungal disease present.     Comments: DM foot exam with microfilament showed some neuropathy noted to left heel, all other areas wnl  Lymphadenopathy:      Cervical: No cervical adenopathy.   Skin:     General: Skin is warm and dry.      Capillary Refill: Capillary refill takes 2 to 3 seconds.   Neurological:      Mental Status: He is alert and oriented to person, place, and time.      Cranial Nerves: No cranial nerve deficit.      Sensory: No sensory deficit.      Motor: No abnormal muscle tone.      Coordination: Coordination normal.   Psychiatric:         Speech: Speech normal.         Behavior: Behavior normal.         ASSESSMENT       Problems Addressed this Visit        Endocrine and Metabolic    Type 2 diabetes mellitus without complication, with long-term current use of insulin (HCC)    Relevant Orders    Comprehensive metabolic panel    Hemoglobin A1c       Skin    Onychomycosis    Relevant Medications    ciclopirox (LOPROX) 0.77 % cream      Other Visit Diagnoses     Annual physical exam    -  Primary    Relevant Orders    ECG 12 Lead    Prostate cancer screening        Relevant Orders    PSA SCREENING    Need for hepatitis C screening test        Relevant Orders    Hepatitis C antibody      Diagnoses       Codes Comments    Annual physical exam    -  Primary ICD-10-CM: Z00.00  ICD-9-CM: V70.0     Type 2 diabetes mellitus without complication, with long-term current use of insulin (HCC)     ICD-10-CM: E11.9, Z79.4  ICD-9-CM: 250.00, V58.67     Onychomycosis     ICD-10-CM: B35.1  ICD-9-CM: 110.1     Prostate cancer screening     ICD-10-CM: Z12.5  ICD-9-CM: V76.44     Need for hepatitis C screening test     ICD-10-CM: Z11.59  ICD-9-CM: V73.89           PLAN    Patient Instructions   DM: HgA1c went from 8.64 to 8; minor  neuropathy noted to left heel. Continue Recommend pt to eat a heart healthy diabetic diet, to drink plenty of water with goal 64 oz a day, stop sodas and monster drinks, to exercise/walking 3-5 times a week, for more than 30 minutes at a time. Continue synjardy XR  mg 1 tab daily,  tresiba 32 units SQ every night, Trulicity 3 mg once per week. Follow up in 6 months. May need to increase trulicity or synjardy XR in 6 months if plateau or worsening with HgA1c seen.     Onychomycosis: will start with topical treatment, if this does not help with re-discuss oral medications. Use ciclopirox topical as prescribed.      Return for follow up in 6 months for fasting labs and follow up.

## 2022-06-03 LAB
ALBUMIN/CREAT UR: 9 MG/G CREAT (ref 0–29)
APPEARANCE UR: CLEAR
BACTERIA #/AREA URNS HPF: NORMAL /[HPF]
BILIRUB UR QL STRIP: NEGATIVE
CASTS URNS QL MICRO: NORMAL /LPF
COLOR UR: YELLOW
CREAT UR-MCNC: 57.5 MG/DL
EPI CELLS #/AREA URNS HPF: NORMAL /HPF (ref 0–10)
GLUCOSE UR QL STRIP: ABNORMAL
HGB UR QL STRIP: NEGATIVE
KETONES UR QL STRIP: NEGATIVE
LEUKOCYTE ESTERASE UR QL STRIP: NEGATIVE
MICRO URNS: ABNORMAL
MICRO URNS: ABNORMAL
MICROALBUMIN UR-MCNC: 5.4 UG/ML
NITRITE UR QL STRIP: NEGATIVE
PH UR STRIP: 6.5 [PH] (ref 5–7.5)
PROT UR QL STRIP: NEGATIVE
RBC #/AREA URNS HPF: NORMAL /HPF (ref 0–2)
SP GR UR STRIP: >=1.03 (ref 1–1.03)
UROBILINOGEN UR STRIP-MCNC: 0.2 MG/DL (ref 0.2–1)
WBC #/AREA URNS HPF: NORMAL /HPF (ref 0–5)

## 2022-07-06 DIAGNOSIS — E11.9 TYPE 2 DIABETES MELLITUS WITHOUT COMPLICATION, WITH LONG-TERM CURRENT USE OF INSULIN: ICD-10-CM

## 2022-07-06 DIAGNOSIS — Z79.4 TYPE 2 DIABETES MELLITUS WITHOUT COMPLICATION, WITH LONG-TERM CURRENT USE OF INSULIN: ICD-10-CM

## 2022-07-06 NOTE — TELEPHONE ENCOUNTER
Caller: Sadiq Aldana    Relationship: Self    Best call back number:[phone]  Requested Prescriptions:   Requested Prescriptions     Pending Prescriptions Disp Refills   • Empagliflozin-metFORMIN HCl ER (Synjardy XR)  MG tablet sustained-release 24 hour 90 tablet 0     Sig: Take 1 tablet by mouth Daily.        [facility] [address] [facility] [phone] [facility] [phone] [facility]     Additional details provided by patient: ENOUGH SAMPLES TO LAST TWO WEEKS    Does the patient have less than a 3 day supply:  [] Yes  [x] No    Tommie Watkins Rep   07/06/22 13:59 EDT

## 2022-07-07 RX ORDER — EMPAGLIFLOZIN, METFORMIN HYDROCHLORIDE 10; 1000 MG/1; MG/1
1 TABLET, EXTENDED RELEASE ORAL DAILY
Qty: 90 TABLET | Refills: 0 | Status: SHIPPED | OUTPATIENT
Start: 2022-07-07 | End: 2022-07-08 | Stop reason: SDUPTHER

## 2022-07-08 DIAGNOSIS — Z79.4 TYPE 2 DIABETES MELLITUS WITHOUT COMPLICATION, WITH LONG-TERM CURRENT USE OF INSULIN: ICD-10-CM

## 2022-07-08 DIAGNOSIS — E11.9 TYPE 2 DIABETES MELLITUS WITHOUT COMPLICATION, WITH LONG-TERM CURRENT USE OF INSULIN: ICD-10-CM

## 2022-07-08 RX ORDER — EMPAGLIFLOZIN, METFORMIN HYDROCHLORIDE 10; 1000 MG/1; MG/1
1 TABLET, EXTENDED RELEASE ORAL DAILY
Qty: 90 TABLET | Refills: 0 | Status: SHIPPED | OUTPATIENT
Start: 2022-07-08 | End: 2022-08-19

## 2022-08-19 ENCOUNTER — OFFICE VISIT (OUTPATIENT)
Dept: FAMILY MEDICINE CLINIC | Facility: CLINIC | Age: 62
End: 2022-08-19

## 2022-08-19 VITALS
DIASTOLIC BLOOD PRESSURE: 80 MMHG | HEART RATE: 107 BPM | HEIGHT: 68 IN | TEMPERATURE: 97.3 F | WEIGHT: 181 LBS | OXYGEN SATURATION: 96 % | BODY MASS INDEX: 27.43 KG/M2 | RESPIRATION RATE: 18 BRPM | SYSTOLIC BLOOD PRESSURE: 120 MMHG

## 2022-08-19 DIAGNOSIS — Z79.4 TYPE 2 DIABETES MELLITUS WITHOUT COMPLICATION, WITH LONG-TERM CURRENT USE OF INSULIN: ICD-10-CM

## 2022-08-19 DIAGNOSIS — E11.9 TYPE 2 DIABETES MELLITUS WITHOUT COMPLICATION, WITH LONG-TERM CURRENT USE OF INSULIN: ICD-10-CM

## 2022-08-19 DIAGNOSIS — I10 PRIMARY HYPERTENSION: ICD-10-CM

## 2022-08-19 DIAGNOSIS — F39 MOOD DISORDER: Primary | ICD-10-CM

## 2022-08-19 DIAGNOSIS — K21.9 GASTROESOPHAGEAL REFLUX DISEASE, UNSPECIFIED WHETHER ESOPHAGITIS PRESENT: ICD-10-CM

## 2022-08-19 DIAGNOSIS — E78.2 MIXED HYPERLIPIDEMIA: ICD-10-CM

## 2022-08-19 PROCEDURE — 99213 OFFICE O/P EST LOW 20 MIN: CPT | Performed by: NURSE PRACTITIONER

## 2022-08-19 RX ORDER — ATORVASTATIN CALCIUM 40 MG/1
40 TABLET, FILM COATED ORAL DAILY
Qty: 30 TABLET | Refills: 5 | Status: SHIPPED | OUTPATIENT
Start: 2022-08-19 | End: 2023-02-17 | Stop reason: SDUPTHER

## 2022-08-19 RX ORDER — INSULIN DEGLUDEC INJECTION 100 U/ML
32 INJECTION, SOLUTION SUBCUTANEOUS NIGHTLY
Qty: 5 PEN | Refills: 6 | Status: SHIPPED | OUTPATIENT
Start: 2022-08-19 | End: 2022-09-16 | Stop reason: SDUPTHER

## 2022-08-19 RX ORDER — PANTOPRAZOLE SODIUM 40 MG/1
40 TABLET, DELAYED RELEASE ORAL 2 TIMES DAILY
Qty: 180 TABLET | Refills: 1 | Status: SHIPPED | OUTPATIENT
Start: 2022-08-19 | End: 2023-02-17 | Stop reason: SDUPTHER

## 2022-08-19 RX ORDER — FLASH GLUCOSE SENSOR
1 KIT MISCELLANEOUS 3 TIMES DAILY
Qty: 9 EACH | Refills: 9 | Status: SHIPPED | OUTPATIENT
Start: 2022-08-19 | End: 2022-09-16 | Stop reason: SDUPTHER

## 2022-08-19 RX ORDER — DULAGLUTIDE 3 MG/.5ML
0.5 INJECTION, SOLUTION SUBCUTANEOUS WEEKLY
Qty: 2 ML | Refills: 6 | Status: SHIPPED | OUTPATIENT
Start: 2022-08-19 | End: 2022-11-11 | Stop reason: SDUPTHER

## 2022-08-19 RX ORDER — LISINOPRIL 10 MG/1
10 TABLET ORAL DAILY
Qty: 30 TABLET | Refills: 5 | Status: SHIPPED | OUTPATIENT
Start: 2022-08-19 | End: 2023-02-17 | Stop reason: SDUPTHER

## 2022-08-19 RX ORDER — CITALOPRAM 10 MG/1
10 TABLET ORAL DAILY
Qty: 30 TABLET | Refills: 2 | Status: ON HOLD | OUTPATIENT
Start: 2022-08-19 | End: 2023-01-10

## 2022-08-19 NOTE — PROGRESS NOTES
Subjective  Answers for HPI/ROS submitted by the patient on 8/18/2022  What is the primary reason for your visit?: Other  Please describe your symptoms.: Depression  Have you had these symptoms before?: Yes  How long have you been having these symptoms?: Greater than 2 weeks  Please list any medications you are currently taking for this condition.: No  Please describe any probable cause for these symptoms. : Divorce, seperation      Sadiq Aldana is a 61 y.o.. male.     Depression  Visit Type: initial  Episode onset: 2 months.  Patient presents with the following symptoms: decreased concentration, depressed mood, fatigue, insomnia and nervousness/anxiety.  Patient is not experiencing: compulsions, excessive worry, palpitations, panic, restlessness, shortness of breath, suicidal ideas, suicidal planning and thoughts of death.    PHQ-9 Depression Screening  Little interest or pleasure in doing things? 3-->nearly every day   Feeling down, depressed, or hopeless? 3-->nearly every day   Trouble falling or staying asleep, or sleeping too much? 3-->nearly every day   Feeling tired or having little energy? 3-->nearly every day   Poor appetite or overeating? 0-->not at all   Feeling bad about yourself - or that you are a failure or have let yourself or your family down? 0-->not at all   Trouble concentrating on things, such as reading the newspaper or watching television? 3-->nearly every day   Moving or speaking so slowly that other people could have noticed? Or the opposite - being so fidgety or restless that you have been moving around a lot more than usual? 1-->several days   Thoughts that you would be better off dead, or of hurting yourself in some way? 0-->not at all   PHQ-9 Total Score 16   If you checked off any problems, how difficult have these problems made it for you to do your work, take care of things at home, or get along with other people?           The following portions of the patient's history were  "reviewed and updated as appropriate: allergies, current medications, past family history, past medical history, past social history, past surgical history and problem list.    Past Medical History:   Diagnosis Date   • Cholelithiasis     Removed   • Depression 6/22    Cwife   • Diabetes mellitus (HCC)    • Erectile dysfunction    • GERD (gastroesophageal reflux disease)    • Hyperlipidemia    • Hypertension    • Obstructive sleep apnea syndrome    • Peptic ulceration        Past Surgical History:   Procedure Laterality Date   • CHOLECYSTECTOMY     • FRACTURE SURGERY     • SHOULDER ARTHROSCOPY Bilateral     removal of bone spurs       Review of Systems   Constitutional: Positive for fatigue.   Respiratory: Negative.  Negative for shortness of breath.    Cardiovascular: Negative for chest pain and palpitations.   Psychiatric/Behavioral: Positive for agitation, decreased concentration, dysphoric mood and sleep disturbance. Negative for self-injury and suicidal ideas. The patient is nervous/anxious and has insomnia.          No Known Allergies    Objective     Vitals:    08/19/22 1443   BP: 120/80   Pulse: 107   Resp: 18   Temp: 97.3 °F (36.3 °C)   TempSrc: Oral   SpO2: 96%   Weight: 82.1 kg (181 lb)   Height: 172.7 cm (67.99\")     Body mass index is 27.53 kg/m².    Physical Exam  Vitals reviewed.   HENT:      Head: Normocephalic.   Eyes:      Pupils: Pupils are equal, round, and reactive to light.   Cardiovascular:      Rate and Rhythm: Normal rate and regular rhythm.   Pulmonary:      Effort: Pulmonary effort is normal.      Breath sounds: Normal breath sounds.   Musculoskeletal:         General: Normal range of motion.   Neurological:      Mental Status: He is alert and oriented to person, place, and time.      Cranial Nerves: No dysarthria or facial asymmetry.      Motor: Motor function is intact.      Coordination: Coordination is intact.   Psychiatric:         Mood and Affect: Mood is depressed. Affect is not " flat.         Speech: Speech normal.         Behavior: Behavior is cooperative.           Current Outpatient Medications:   •  aspirin 81 MG EC tablet, Take 1 tablet by mouth Daily., Disp: 90 tablet, Rfl: 4  •  atorvastatin (Lipitor) 40 MG tablet, Take 1 tablet by mouth Daily., Disp: 30 tablet, Rfl: 5  •  cholecalciferol (VITAMIN D3) 10 MCG (400 UNIT) tablet, Take 1 tablet by mouth Daily., Disp: 90 tablet, Rfl: 4  •  ciclopirox (LOPROX) 0.77 % cream, Apply 1 application topically to the appropriate area as directed 2 (Two) Times a Day. Apply to affected nails once daily, for best results remove with acetone every 7 days and repeat applications, Disp: 30 g, Rfl: 1  •  Continuous Blood Gluc Sensor (Perceptive Pixelyle Eulalio 14 Day Sensor) misc, 1 Units 3 (Three) Times a Day., Disp: 9 each, Rfl: 9  •  lisinopril (PRINIVIL,ZESTRIL) 10 MG tablet, Take 1 tablet by mouth Daily., Disp: 30 tablet, Rfl: 5  •  naproxen sodium (ALEVE) 220 MG tablet, Take 1 tablet by mouth 2 (Two) Times a Day As Needed for Mild Pain ., Disp: 90 tablet, Rfl: 4  •  pantoprazole (PROTONIX) 40 MG EC tablet, Take 1 tablet by mouth 2 (Two) Times a Day., Disp: 180 tablet, Rfl: 1  •  sildenafil (VIAGRA) 25 MG tablet, Take 2 tablets by mouth Daily As Needed for Erectile Dysfunction., Disp: 90 tablet, Rfl: 4  •  Tresiba FlexTouch 100 UNIT/ML solution pen-injector injection, Inject 32 Units under the skin into the appropriate area as directed Every Night. Doing in am now, Disp: 5 pen, Rfl: 6  •  Trulicity 3 MG/0.5ML solution pen-injector, Inject 0.5 mL as directed 1 (One) Time Per Week., Disp: 2 mL, Rfl: 6  •  citalopram (CeleXA) 10 MG tablet, Take 1 tablet by mouth Daily., Disp: 30 tablet, Rfl: 2  •  empagliflozin (Jardiance) 25 MG tablet tablet, Take 1 tablet by mouth Daily., Disp: 30 tablet, Rfl: 2  •  metFORMIN (Glucophage) 500 MG tablet, Take 2 tablets by mouth 2 (Two) Times a Day With Meals for 30 days., Disp: 120 tablet, Rfl: 2    Recent Results (from the  past 2016 hour(s))   Microalbumin / Creatinine Urine Ratio - Urine, Clean Catch    Collection Time: 06/02/22  1:08 PM    Specimen: Urine, Clean Catch   Result Value Ref Range    Creatinine, Urine 57.5 Not Estab. mg/dL    Microalbumin, Urine 5.4 Not Estab. ug/mL    Microalbumin/Creatinine Ratio 9 0 - 29 mg/g creat   Urinalysis With Microscopic - Urine, Clean Catch    Collection Time: 06/02/22  1:08 PM    Specimen: Urine, Clean Catch   Result Value Ref Range    Specific Gravity, UA      >=1.030 (A) 1.005 - 1.030    pH, UA 6.5 5.0 - 7.5    Color, UA Yellow Yellow    Appearance, UA Clear Clear    Leukocytes, UA Negative Negative    Protein Negative Negative/Trace    Glucose, UA 3+ (A) Negative    Ketones Negative Negative    Blood, UA Negative Negative    Bilirubin, UA Negative Negative    Urobilinogen, UA 0.2 0.2 - 1.0 mg/dL    Nitrite, UA Negative Negative    Microscopic Examination Comment     Microscopic Examination See below:    Microscopic Examination -    Collection Time: 06/02/22  1:08 PM   Result Value Ref Range    WBC, UA None seen 0 - 5 /hpf    RBC, UA None seen 0 - 2 /hpf    Epithelial Cells (non renal) None seen 0 - 10 /hpf    Casts None seen None seen /lpf    Bacteria, UA None seen None seen/Few         Diagnoses and all orders for this visit:    1. Mood disorder (HCC) (Primary)  -     citalopram (CeleXA) 10 MG tablet; Take 1 tablet by mouth Daily.  Dispense: 30 tablet; Refill: 2    2. Type 2 diabetes mellitus without complication, with long-term current use of insulin (HCC)  -     empagliflozin (Jardiance) 25 MG tablet tablet; Take 1 tablet by mouth Daily.  Dispense: 30 tablet; Refill: 2  -     metFORMIN (Glucophage) 500 MG tablet; Take 2 tablets by mouth 2 (Two) Times a Day With Meals for 30 days.  Dispense: 120 tablet; Refill: 2  -     Continuous Blood Gluc Sensor (FreeStyle Eulalio 14 Day Sensor) misc; 1 Units 3 (Three) Times a Day.  Dispense: 9 each; Refill: 9  -     Trulicity 3 MG/0.5ML solution  pen-injector; Inject 0.5 mL as directed 1 (One) Time Per Week.  Dispense: 2 mL; Refill: 6  -     Tresiba FlexTouch 100 UNIT/ML solution pen-injector injection; Inject 32 Units under the skin into the appropriate area as directed Every Night. Doing in am now  Dispense: 5 pen; Refill: 6    3. Primary hypertension  -     lisinopril (PRINIVIL,ZESTRIL) 10 MG tablet; Take 1 tablet by mouth Daily.  Dispense: 30 tablet; Refill: 5    4. Mixed hyperlipidemia  -     atorvastatin (Lipitor) 40 MG tablet; Take 1 tablet by mouth Daily.  Dispense: 30 tablet; Refill: 5    5. Gastroesophageal reflux disease, unspecified whether esophagitis present  -     pantoprazole (PROTONIX) 40 MG EC tablet; Take 1 tablet by mouth 2 (Two) Times a Day.  Dispense: 180 tablet; Refill: 1        Patient Instructions   Discussed counseling, recommended pt check out  [email or web address] to find therapist; recommend getting regular exercise, getting outside, getting plenty of sleep, eating a heart healthy diet, and finding activities that you enjoy and doing them/make time to do them-like painting. Discussed treatment with citalopram 10 mg 1 ab daily; pt to follow up in 3 months.    National Suicide Prevention Lifeline  · 988. This is free, 24-hour help.  Contact a health care provider if:  · Your symptoms get worse.  · You develop new symptoms.  Get help right away if:  · You self-harm.  · You have serious thoughts about hurting yourself or others.  · You see, hear, taste, smell, or feel things that are not present (hallucinate).       Return if symptoms worsen or fail to improve, for 3 months.

## 2022-08-19 NOTE — PATIENT INSTRUCTIONS
Discussed counseling, recommended pt check out  [email or web address] to find therapist; recommend getting regular exercise, getting outside, getting plenty of sleep, eating a heart healthy diet, and finding activities that you enjoy and doing them/make time to do them-like painting. Discussed treatment with citalopram 10 mg 1 ab daily; pt to follow up in 3 months.    National Suicide Prevention Lifeline  982. This is free, 24-hour help.  Contact a health care provider if:  Your symptoms get worse.  You develop new symptoms.  Get help right away if:  You self-harm.  You have serious thoughts about hurting yourself or others.  You see, hear, taste, smell, or feel things that are not present (hallucinate).

## 2022-08-24 ENCOUNTER — TELEPHONE (OUTPATIENT)
Dept: FAMILY MEDICINE CLINIC | Facility: CLINIC | Age: 62
End: 2022-08-24

## 2022-08-24 NOTE — TELEPHONE ENCOUNTER
Caller: Sadiq Aldana    Relationship: Self    Best call back number: [phone]    What medications are you currently taking:   Current Outpatient Medications on File Prior to Visit   Medication Sig Dispense Refill   • aspirin 81 MG EC tablet Take 1 tablet by mouth Daily. 90 tablet 4   • atorvastatin (Lipitor) 40 MG tablet Take 1 tablet by mouth Daily. 30 tablet 5   • cholecalciferol (VITAMIN D3) 10 MCG (400 UNIT) tablet Take 1 tablet by mouth Daily. 90 tablet 4   • ciclopirox (LOPROX) 0.77 % cream Apply 1 application topically to the appropriate area as directed 2 (Two) Times a Day. Apply to affected nails once daily, for best results remove with acetone every 7 days and repeat applications 30 g 1   • citalopram (CeleXA) 10 MG tablet Take 1 tablet by mouth Daily. 30 tablet 2   • Continuous Blood Gluc Sensor (FreeStyle Eulalio 14 Day Sensor) misc 1 Units 3 (Three) Times a Day. 9 each 9   • empagliflozin (Jardiance) 25 MG tablet tablet Take 1 tablet by mouth Daily. 30 tablet 2   • lisinopril (PRINIVIL,ZESTRIL) 10 MG tablet Take 1 tablet by mouth Daily. 30 tablet 5   • metFORMIN (Glucophage) 500 MG tablet Take 2 tablets by mouth 2 (Two) Times a Day With Meals for 30 days. 120 tablet 2   • naproxen sodium (ALEVE) 220 MG tablet Take 1 tablet by mouth 2 (Two) Times a Day As Needed for Mild Pain . 90 tablet 4   • pantoprazole (PROTONIX) 40 MG EC tablet Take 1 tablet by mouth 2 (Two) Times a Day. 180 tablet 1   • sildenafil (VIAGRA) 25 MG tablet Take 2 tablets by mouth Daily As Needed for Erectile Dysfunction. 90 tablet 4   • Tresiba FlexTouch 100 UNIT/ML solution pen-injector injection Inject 32 Units under the skin into the appropriate area as directed Every Night. Doing in am now 5 pen 6   • Trulicity 3 MG/0.5ML solution pen-injector Inject 0.5 mL as directed 1 (One) Time Per Week. 2 mL 6     No current facility-administered medications on file prior to visit.        Which medication are you concerned about:  CITALOPRAM    Who prescribed you this medication: XOCHITL HILARIO    What are your concerns: PATIENT STATES THIS MEDICATION IS MAKING HIM EXTREMELY NAUSEOUS, EVEN WHEN HE TAKES IT AT NIGHT. HE WOKE UP ALL NIGHT LONG WITH NAUSEA. DOES XOCHITL WANT TO TRY TRAZADONE? PLEASE ADVISE.

## 2022-08-24 NOTE — TELEPHONE ENCOUNTER
Please call pt back and ask him to cut tab in 1/2 and take 1/2 tab at night for 2 weeks. If still no improvement with nausea call office back. Thanks  Ofelia

## 2022-09-16 DIAGNOSIS — E11.9 TYPE 2 DIABETES MELLITUS WITHOUT COMPLICATION, WITH LONG-TERM CURRENT USE OF INSULIN: ICD-10-CM

## 2022-09-16 DIAGNOSIS — Z79.4 TYPE 2 DIABETES MELLITUS WITHOUT COMPLICATION, WITH LONG-TERM CURRENT USE OF INSULIN: ICD-10-CM

## 2022-09-16 RX ORDER — SILDENAFIL 25 MG/1
50 TABLET, FILM COATED ORAL DAILY PRN
Qty: 90 TABLET | Refills: 0 | Status: SHIPPED | OUTPATIENT
Start: 2022-09-16 | End: 2022-10-10

## 2022-09-16 RX ORDER — FLASH GLUCOSE SENSOR
1 KIT MISCELLANEOUS 3 TIMES DAILY
Qty: 9 EACH | Refills: 9 | Status: SHIPPED | OUTPATIENT
Start: 2022-09-16

## 2022-09-16 RX ORDER — INSULIN DEGLUDEC INJECTION 100 U/ML
32 INJECTION, SOLUTION SUBCUTANEOUS NIGHTLY
Qty: 200 ML | Refills: 1 | Status: SHIPPED | OUTPATIENT
Start: 2022-09-16 | End: 2023-02-17 | Stop reason: SDUPTHER

## 2022-09-16 NOTE — TELEPHONE ENCOUNTER
Caller: Sadiq Aldana    Relationship: Self    Best call back number: [phone]    Requested Prescriptions:   Requested Prescriptions     Pending Prescriptions Disp Refills   • Tresiba FlexTouch 100 UNIT/ML solution pen-injector injection       Sig: Inject 32 Units under the skin into the appropriate area as directed Every Night. Doing in am now   • sildenafil (VIAGRA) 25 MG tablet 90 tablet 4     Sig: Take 2 tablets by mouth Daily As Needed for Erectile Dysfunction.   • Continuous Blood Gluc Sensor (FreeStyle Eulalio 14 Day Sensor) misc 9 each 9     Si Units 3 (Three) Times a Day.        Pharmacy where request should be sent: [facility] [address] [address] [facility] AT [facility] [phone] [facility] [phone]      Additional details provided by patient: PATIENT IS OUT OF THE Continuous Blood Gluc Sensor (FreeStyle Eulalio 14 Day Sensor) misc.    PATIENT HAS 1 DAY SUPPLY LEFT OF THE Tresiba FlexTouch 100 UNIT/ML solution pen-injector injection, AND sildenafil (VIAGRA) 25 MG tablet.    PATIENT STATED THAT THE PHARMACY WOULD NOT FILL THE Tresiba FlexTouch 100 UNIT/ML solution pen-injector injection OR  Continuous Blood Gluc Sensor (FreeStyle Eulalio 14 Day Sensor) misc UNTIL THEY WERE GIVEN MORE DETAILS ON THE DIRECTIONS BY HIS PROVIDER.    Does the patient have less than a 3 day supply:  [x] Yes  [] No    Tommie Zimmerman Rep   22 09:34 EDT

## 2022-09-16 NOTE — TELEPHONE ENCOUNTER
Rx Refill Note  Requested Prescriptions     Pending Prescriptions Disp Refills   • Tresiba FlexTouch 100 UNIT/ML solution pen-injector injection       Sig: Inject 32 Units under the skin into the appropriate area as directed Every Night. Doing in am now   • sildenafil (VIAGRA) 25 MG tablet 90 tablet 4     Sig: Take 2 tablets by mouth Daily As Needed for Erectile Dysfunction.   • Continuous Blood Gluc Sensor (FreeStyle Eulalio 14 Day Sensor) misc 9 each 9     Si Units 3 (Three) Times a Day.      Last office visit with prescribing clinician: 2022      Next office visit with prescribing clinician: 2022            Radha Parker MA  22, 10:26 EDT

## 2022-10-10 RX ORDER — SILDENAFIL 25 MG/1
TABLET, FILM COATED ORAL
Qty: 90 TABLET | Refills: 0 | Status: SHIPPED | OUTPATIENT
Start: 2022-10-10

## 2022-11-10 ENCOUNTER — TELEPHONE (OUTPATIENT)
Dept: FAMILY MEDICINE CLINIC | Facility: CLINIC | Age: 62
End: 2022-11-10

## 2022-11-10 NOTE — TELEPHONE ENCOUNTER
[facility] staff attempted to follow warm transfer process and was unsuccessful     Caller: Sadiq Aldana    Relationship to patient: Self    Best call back number:     Patient is needing:  PATIENT STATES THAT HIS PHARMACY HAS TOLD HIM TRULICITY IS ON BACKORDER. PATIENT STATES HE IS GOING OUT OF [place] FOR ONE WEEK ON 11/18/22 AND WOULD LIKE TO KNOW IF THERE IS AN ALTERNATIVE MEDICATION THAT HE CAN TAKE INSTEAD, IN THE EVENT HE CANNOT GET TRULICITY FILLED PRIOR TO THAT DATE.  PATIENT ALSO WOULD LIKE A CALLBACK FROM THE OFFICE TO ADVISE IF THEY HAVE ANY SAMPLES THAT CAN HOLD HIM OVER.    PLEASE ADVISE PATIENT.    [facility] WAS UNABLE TO WARM TRANSFER.

## 2022-11-11 DIAGNOSIS — E11.9 TYPE 2 DIABETES MELLITUS WITHOUT COMPLICATION, WITH LONG-TERM CURRENT USE OF INSULIN: ICD-10-CM

## 2022-11-11 DIAGNOSIS — Z79.4 TYPE 2 DIABETES MELLITUS WITHOUT COMPLICATION, WITH LONG-TERM CURRENT USE OF INSULIN: ICD-10-CM

## 2022-11-11 RX ORDER — DULAGLUTIDE 3 MG/.5ML
0.5 INJECTION, SOLUTION SUBCUTANEOUS WEEKLY
Qty: 2 ML | Refills: 6 | Status: SHIPPED | OUTPATIENT
Start: 2022-11-11 | End: 2022-11-14 | Stop reason: SDUPTHER

## 2022-11-11 NOTE — TELEPHONE ENCOUNTER
Pt said naif told him its on backorder at all of their locations.he said he will check with [employer].

## 2022-11-11 NOTE — TELEPHONE ENCOUNTER
[facility] staff attempted to follow warm transfer process and was unsuccessful      Caller: Sadiq Aldana     Relationship to patient: Self     Best call back number:      Patient is needing:  PATIENT STATES THAT HIS PHARMACY HAS TOLD HIM TRULICITY IS ON BACKORDER. PATIENT STATES HE IS GOING OUT OF THE COUNTRY FOR ONE WEEK ON 11/18/22 AND WOULD LIKE TO KNOW IF THERE IS AN ALTERNATIVE MEDICATION THAT HE CAN TAKE INSTEAD, IN THE EVENT HE CANNOT GET TRULICITY FILLED PRIOR TO THAT DATE.  PATIENT ALSO WOULD LIKE A CALLBACK FROM THE OFFICE TO ADVISE IF THEY HAVE ANY SAMPLES THAT CAN HOLD HIM OVER.    PLEASE ADVISE PATIENT.    [facility] WAS UNABLE TO WARM TRANSFER.

## 2022-11-14 DIAGNOSIS — E11.9 TYPE 2 DIABETES MELLITUS WITHOUT COMPLICATION, WITH LONG-TERM CURRENT USE OF INSULIN: ICD-10-CM

## 2022-11-14 DIAGNOSIS — Z79.4 TYPE 2 DIABETES MELLITUS WITHOUT COMPLICATION, WITH LONG-TERM CURRENT USE OF INSULIN: ICD-10-CM

## 2022-11-14 RX ORDER — DULAGLUTIDE 3 MG/.5ML
0.5 INJECTION, SOLUTION SUBCUTANEOUS WEEKLY
Qty: 2 ML | Refills: 6 | Status: SHIPPED | OUTPATIENT
Start: 2022-11-14 | End: 2022-11-30

## 2022-11-14 NOTE — TELEPHONE ENCOUNTER
Caller: Sadiq Aldana    Relationship to patient: Self    Best call back number: [phone]    Patient is needing: PT CALLED BACK AND STATED THAT [facility] CLOSED BECAUSE THEY DO NOT HAVE A PHARMACIST FOR THAT LOCATION. PT STATED THAT HE FOUND A PHARMACY THAT HAS A DIFFERENT DOSAGE IN STOCK AND WANTS TO KNOW IF PCP WANTS TO CHANGE THE PRESCRIPTION OR HAVE PT SKIP MEDICATION FOR A FEW WEEKS UNTIL MED IS BACK IN STOCK.     PT CONFIRMED PHARMACY:   (HAS 4 ML IN STOCK)  [id number] [place], [place] - [address] AT [facility] [phone] [facility] [phone] FX     PLEASE ADVISE. PT LEAVES 11/17/22 TO GO OUT OF THE COUNTRY.

## 2022-11-14 NOTE — TELEPHONE ENCOUNTER
Caller: Sadiq Aldana     Relationship to patient: Self     Best call back number: [phone]     Patient is needing: PT CALLED BACK AND STATED THAT [facility] BECAUSE THEY DO NOT HAVE A PHARMACIST FOR THAT LOCATION. PT STATED THAT HE FOUND A PHARMACY THAT HAS A DIFFERENT DOSAGE IN STOCK AND WANTS TO KNOW IF PCP WANTS TO CHANGE THE PRESCRIPTION OR HAVE PT SKIP MEDICATION FOR A FEW WEEKS UNTIL MED IS BACK IN STOCK.      PT CONFIRMED PHARMACY:   (HAS 4 ML IN STOCK)  [id number] [place], [place] - [address] AT [facility] [phone] [facility] [phone] FX      PLEASE ADVISE. PT LEAVES 11/17/22 TO GO OUT OF THE COUNTRY.

## 2022-11-15 ENCOUNTER — TELEPHONE (OUTPATIENT)
Dept: FAMILY MEDICINE CLINIC | Facility: CLINIC | Age: 62
End: 2022-11-15

## 2022-11-15 DIAGNOSIS — E11.9 TYPE 2 DIABETES MELLITUS WITHOUT COMPLICATION, WITH LONG-TERM CURRENT USE OF INSULIN: ICD-10-CM

## 2022-11-15 DIAGNOSIS — Z79.4 TYPE 2 DIABETES MELLITUS WITHOUT COMPLICATION, WITH LONG-TERM CURRENT USE OF INSULIN: ICD-10-CM

## 2022-11-15 RX ORDER — DULAGLUTIDE 4.5 MG/.5ML
3 INJECTION, SOLUTION SUBCUTANEOUS WEEKLY
Qty: 1.5 ML | Refills: 0 | Status: SHIPPED | OUTPATIENT
Start: 2022-11-15 | End: 2022-11-30

## 2022-11-15 NOTE — TELEPHONE ENCOUNTER
PT CALLED IN STATING THAT [facility] 4MG  PT REQUESTING A SCRIPT BE SENT FOR THIS INSTEAD OF THE 3MG.  PLEASE CONTACT PT TO ADVISE -[phone]

## 2022-11-28 ENCOUNTER — NURSE TRIAGE (OUTPATIENT)
Dept: CALL CENTER | Facility: HOSPITAL | Age: 62
End: 2022-11-28

## 2022-11-30 ENCOUNTER — TELEPHONE (OUTPATIENT)
Dept: FAMILY MEDICINE CLINIC | Facility: CLINIC | Age: 62
End: 2022-11-30

## 2022-11-30 DIAGNOSIS — Z79.4 TYPE 2 DIABETES MELLITUS WITHOUT COMPLICATION, WITH LONG-TERM CURRENT USE OF INSULIN: ICD-10-CM

## 2022-11-30 DIAGNOSIS — E11.9 TYPE 2 DIABETES MELLITUS WITHOUT COMPLICATION, WITH LONG-TERM CURRENT USE OF INSULIN: ICD-10-CM

## 2022-11-30 RX ORDER — DULAGLUTIDE 3 MG/.5ML
0.5 INJECTION, SOLUTION SUBCUTANEOUS WEEKLY
Qty: 2 ML | Refills: 6 | Status: SHIPPED | OUTPATIENT
Start: 2022-11-30 | End: 2023-01-20

## 2022-11-30 NOTE — TELEPHONE ENCOUNTER
Please call pt and inform him that new script sent to his pharmacy for correct dosage. Thanks   Ofelia HAWKINS NOTIFIED THROUGH [facility]

## 2022-11-30 NOTE — TELEPHONE ENCOUNTER
Caller: Sadiq Aldana    Relationship: Self    Best call back number: [phone]    What medications are you currently taking: Dulaglutide (Trulicity) 4.5 MG/0.5ML solution pen-injector     What are your concerns: PATIENT CALLING STATING THAT THE PRESCRIPTION STILL STATES FOR HIM TO TAKE 0.33 HE IS NO LONGER TAKING THIS AMOUNT PHARMACY NEEDS A NEW PRESCRIPTION STATING HOW MUCH HE IS SUPPOSED TO INJECT EACH WEEK

## 2022-11-30 NOTE — TELEPHONE ENCOUNTER
PT IS CALLING TO CONFIRM DOSE  Caller: Sadiq Aldana     Relationship: Self     Best call back number: [phone]     What medications are you currently taking: Dulaglutide (Trulicity) 4.5 MG/0.5ML solution pen-injector     What are your concerns: PATIENT CALLING STATING THAT THE PRESCRIPTION STILL STATES FOR HIM TO TAKE 0.33 HE IS NO LONGER TAKING THIS AMOUNT PHARMACY NEEDS A NEW PRESCRIPTION STATING HOW MUCH HE IS SUPPOSED TO INJECT EACH WEEK

## 2022-12-01 ENCOUNTER — TELEPHONE (OUTPATIENT)
Dept: FAMILY MEDICINE CLINIC | Facility: CLINIC | Age: 62
End: 2022-12-01

## 2022-12-01 NOTE — TELEPHONE ENCOUNTER
Caller: Sadiq Aldana    Relationship: Self    Best call back number: [phone]    What was the call regarding: PATIENT STATED THAT HIS PRESCRIPTION WAS NOT SENT IN CORRECTLY. PATIENT STATED THAT IT WAS SUPPOSED TO SENT IN FOR A DOSAGE OF 4.5 ML AND IT WAS ACTUALLY SENT IN FOR 3 ML. PLEASE ADVISE.     PATIENT'S PHARMACY:  [id number] - [place], [place] - [address] [address] AT [facility] [phone] [facility] [phone] FX      Do you require a callback: YES

## 2022-12-01 NOTE — TELEPHONE ENCOUNTER
HE WOULD LIKE TO STAY ON HIGHER DOSE TRULICITY   Caller: Sadiq Aldana     Relationship: Self     Best call back number:   Sadiq Aldana (Self) [phone] (Mobile)         What was the call regarding:   TRULICITY WAS THE WRONG DOSAGE   WAS SUPPOSED TO BE 4.5       HE HAS BEEN OUT OF MEDICATION AND NEEDING ASAP            Do you require a callback:      PLEASE CALL TODAY   Sadiq Aldana (Self) [phone] (Mobile)

## 2022-12-01 NOTE — TELEPHONE ENCOUNTER
Caller: Sadiq Aldana    Relationship: Self    Best call back number:   Sadiq Aldana (Self) [phone] (Mobile)       What was the call regarding:   TRULICITY WAS THE WRONG DOSAGE   WAS SUPPOSED TO BE 4.5      HE HAS BEEN OUT OF MEDICATION AND NEEDING ASAP         Do you require a callback:     PLEASE CALL TODAY   Sadiq Aldana (Self) [phone] (Mobile)

## 2022-12-02 ENCOUNTER — OFFICE VISIT (OUTPATIENT)
Dept: FAMILY MEDICINE CLINIC | Facility: CLINIC | Age: 62
End: 2022-12-02

## 2022-12-02 VITALS
BODY MASS INDEX: 28.61 KG/M2 | SYSTOLIC BLOOD PRESSURE: 110 MMHG | TEMPERATURE: 97.7 F | OXYGEN SATURATION: 97 % | DIASTOLIC BLOOD PRESSURE: 60 MMHG | HEIGHT: 68 IN | WEIGHT: 188.8 LBS | HEART RATE: 86 BPM

## 2022-12-02 DIAGNOSIS — J02.9 PHARYNGITIS, UNSPECIFIED ETIOLOGY: ICD-10-CM

## 2022-12-02 DIAGNOSIS — R07.9 RECURRENT CHEST PAIN: Primary | ICD-10-CM

## 2022-12-02 PROCEDURE — 93005 ELECTROCARDIOGRAM TRACING: CPT | Performed by: STUDENT IN AN ORGANIZED HEALTH CARE EDUCATION/TRAINING PROGRAM

## 2022-12-02 PROCEDURE — 99214 OFFICE O/P EST MOD 30 MIN: CPT | Performed by: STUDENT IN AN ORGANIZED HEALTH CARE EDUCATION/TRAINING PROGRAM

## 2022-12-02 NOTE — TELEPHONE ENCOUNTER
Caller: Sadiq Aldana    Relationship to patient: Self    Best call back number: [phone]    Patient is needing: RETURNED MISSED CALL.  PLEASE ADVISE

## 2022-12-02 NOTE — PROGRESS NOTES
"Chief Complaint  Chest Pain (Chest discomfort on rt side started 1mo ago) and Med Management (Wants trulicity increased to 4.5,jonathan temporally switched from 3mg to 4 because pharmacy was out of stock)    Subjective        Sadiq Aldana presents to [facility]  History of Present Illness  For multiple issues.  First main concern is chest pain going on for 1 month.  Patient stated chest pain are on and off in nature.  Sharp, sometimes burning type chest pain more in the center and sometimes on the right side of the chest.  Pain will come suddenly not associated with any exacerbating factors will stay there for few seconds to few minutes and then will resolve by itself.  Not associated with any relieving factors also.  Patient has GERD problem and is on pantoprazole 40 mg 2 times a day already.  Patient stated 4 years ago he used to have chest pain like this and he was following cardiology at that point and was undergo stress testing which came out negative for any heart issue.  Patient is a known case of diabetes mellitus and last A1c is around 8.  Sore throat, runny nose for 2 days.  Stated there is an issue going on with his diabetic medication Trulicity.  Patient stated recently his PCP has sent prescription to the pharmacy and pharmacy updated him to  the medication later today.  Review of system is negative for fever, headache,  shortness of breath, palpitation, nausea, vomiting, any recent change in bladder habits.        Objective   Vital Signs:  /60   Pulse 86   Temp 97.7 °F (36.5 °C)   Ht 172.7 cm (67.99\")   Wt 85.6 kg (188 lb 12.8 oz)   SpO2 97%   BMI 28.71 kg/m²   Estimated body mass index is 28.71 kg/m² as calculated from the following:    Height as of this encounter: 172.7 cm (67.99\").    Weight as of this encounter: 85.6 kg (188 lb 12.8 oz).          Physical Exam  HENT:      Head: Normocephalic and atraumatic.      Mouth/Throat:      Mouth: Mucous " membranes are moist.      Pharynx: Oropharynx is clear.   Eyes:      Extraocular Movements: Extraocular movements intact.      Conjunctiva/sclera: Conjunctivae normal.      Pupils: Pupils are equal, round, and reactive to light.   Cardiovascular:      Rate and Rhythm: Normal rate and regular rhythm.   Pulmonary:      Effort: Pulmonary effort is normal.      Breath sounds: Normal breath sounds.   Abdominal:      General: Bowel sounds are normal.      Palpations: Abdomen is soft.   Musculoskeletal:         General: Normal range of motion.      Cervical back: Neck supple.   Skin:     General: Skin is warm.      Capillary Refill: Capillary refill takes less than 2 seconds.   Neurological:      General: No focal deficit present.      Mental Status: He is alert and oriented to person, place, and time. Mental status is at baseline.   Psychiatric:         Mood and Affect: Mood normal.        Result Review :                Assessment and Plan   Diagnoses and all orders for this visit:    1. Recurrent chest pain (Primary)  Comments:  Patient has not established care with cardiology here in [place], cardiology referral given, EKG done showed sinus rhythm, no acute ST-T wave changes  Orders:  -     Ambulatory Referral to Cardiology  -     ECG 12 Lead    2. Pharyngitis, unspecified etiology  Comments:  Likely viral related, advised patient to do salt water warm gargles, RTC or ER if symptoms worsen or persist    Currently patient does not have chest pain, advised patient if chest pain changes in nature which means present for longer time or different than usual chest pain then he should immediately go to ER for further evaluation otherwise he can wait for cardiology appointment         Follow Up   No follow-ups on file.  Patient was given instructions and counseling regarding his condition or for health maintenance advice. Please see specific information pulled into the AVS if appropriate.

## 2022-12-14 DIAGNOSIS — Z79.4 TYPE 2 DIABETES MELLITUS WITHOUT COMPLICATION, WITH LONG-TERM CURRENT USE OF INSULIN: ICD-10-CM

## 2022-12-14 DIAGNOSIS — E11.9 TYPE 2 DIABETES MELLITUS WITHOUT COMPLICATION, WITH LONG-TERM CURRENT USE OF INSULIN: ICD-10-CM

## 2022-12-15 RX ORDER — EMPAGLIFLOZIN 25 MG/1
TABLET, FILM COATED ORAL
Qty: 30 TABLET | Refills: 2 | Status: SHIPPED | OUTPATIENT
Start: 2022-12-15 | End: 2023-02-17 | Stop reason: SDUPTHER

## 2023-01-10 ENCOUNTER — APPOINTMENT (OUTPATIENT)
Dept: GENERAL RADIOLOGY | Facility: HOSPITAL | Age: 63
DRG: 247 | End: 2023-01-10
Payer: COMMERCIAL

## 2023-01-10 ENCOUNTER — HOSPITAL ENCOUNTER (INPATIENT)
Facility: HOSPITAL | Age: 63
LOS: 1 days | Discharge: HOME | DRG: 247 | End: 2023-01-12
Attending: EMERGENCY MEDICINE | Admitting: EMERGENCY MEDICINE
Payer: COMMERCIAL

## 2023-01-10 ENCOUNTER — APPOINTMENT (OUTPATIENT)
Dept: CT IMAGING | Facility: HOSPITAL | Age: 63
DRG: 247 | End: 2023-01-10
Payer: COMMERCIAL

## 2023-01-10 DIAGNOSIS — R07.9 CHEST PAIN, UNSPECIFIED TYPE: Primary | ICD-10-CM

## 2023-01-10 DIAGNOSIS — R91.1 NODULE OF RIGHT LUNG: ICD-10-CM

## 2023-01-10 DIAGNOSIS — E78.2 MIXED HYPERLIPIDEMIA: ICD-10-CM

## 2023-01-10 DIAGNOSIS — I20.0 UNSTABLE ANGINA: ICD-10-CM

## 2023-01-10 DIAGNOSIS — I25.700 CORONARY ARTERY DISEASE INVOLVING CORONARY BYPASS GRAFT OF NATIVE HEART WITH UNSTABLE ANGINA PECTORIS: ICD-10-CM

## 2023-01-10 DIAGNOSIS — I10 PRIMARY HYPERTENSION: ICD-10-CM

## 2023-01-10 DIAGNOSIS — E11.9 TYPE 2 DIABETES MELLITUS WITHOUT COMPLICATION, WITH LONG-TERM CURRENT USE OF INSULIN: ICD-10-CM

## 2023-01-10 DIAGNOSIS — Z79.4 TYPE 2 DIABETES MELLITUS WITHOUT COMPLICATION, WITH LONG-TERM CURRENT USE OF INSULIN: ICD-10-CM

## 2023-01-10 LAB
ALBUMIN SERPL-MCNC: 4 G/DL (ref 3.5–5.2)
ALBUMIN/GLOB SERPL: 1.8 G/DL
ALP SERPL-CCNC: 75 U/L (ref 39–117)
ALT SERPL W P-5'-P-CCNC: 12 U/L (ref 1–41)
ANION GAP SERPL CALCULATED.3IONS-SCNC: 11.5 MMOL/L (ref 5–15)
AST SERPL-CCNC: 15 U/L (ref 1–40)
BASOPHILS # BLD AUTO: 0.05 10*3/MM3 (ref 0–0.2)
BASOPHILS NFR BLD AUTO: 0.9 % (ref 0–1.5)
BILIRUB SERPL-MCNC: 0.2 MG/DL (ref 0–1.2)
BUN SERPL-MCNC: 25 MG/DL (ref 8–23)
BUN/CREAT SERPL: 20.5 (ref 7–25)
CALCIUM SPEC-SCNC: 9 MG/DL (ref 8.6–10.5)
CHLORIDE SERPL-SCNC: 103 MMOL/L (ref 98–107)
CHOLEST SERPL-MCNC: 133 MG/DL (ref 0–200)
CO2 SERPL-SCNC: 24.5 MMOL/L (ref 22–29)
CREAT SERPL-MCNC: 1.22 MG/DL (ref 0.76–1.27)
D DIMER PPP FEU-MCNC: 0.75 MCGFEU/ML (ref 0–0.62)
DEPRECATED RDW RBC AUTO: 41.1 FL (ref 37–54)
EGFRCR SERPLBLD CKD-EPI 2021: 67 ML/MIN/1.73
EOSINOPHIL # BLD AUTO: 0.11 10*3/MM3 (ref 0–0.4)
EOSINOPHIL NFR BLD AUTO: 1.9 % (ref 0.3–6.2)
ERYTHROCYTE [DISTWIDTH] IN BLOOD BY AUTOMATED COUNT: 12.3 % (ref 12.3–15.4)
GLOBULIN UR ELPH-MCNC: 2.2 GM/DL
GLUCOSE SERPL-MCNC: 227 MG/DL (ref 65–99)
HCT VFR BLD AUTO: 39.5 % (ref 37.5–51)
HDLC SERPL-MCNC: 46 MG/DL (ref 40–60)
HGB BLD-MCNC: 13 G/DL (ref 13–17.7)
HOLD SPECIMEN: NORMAL
HOLD SPECIMEN: NORMAL
IMM GRANULOCYTES # BLD AUTO: 0.02 10*3/MM3 (ref 0–0.05)
IMM GRANULOCYTES NFR BLD AUTO: 0.3 % (ref 0–0.5)
INR PPP: 0.89 (ref 0.9–1.1)
LDLC SERPL CALC-MCNC: 67 MG/DL (ref 0–100)
LDLC/HDLC SERPL: 1.41 {RATIO}
LYMPHOCYTES # BLD AUTO: 1.36 10*3/MM3 (ref 0.7–3.1)
LYMPHOCYTES NFR BLD AUTO: 23.3 % (ref 19.6–45.3)
MCH RBC QN AUTO: 30.2 PG (ref 26.6–33)
MCHC RBC AUTO-ENTMCNC: 32.9 G/DL (ref 31.5–35.7)
MCV RBC AUTO: 91.6 FL (ref 79–97)
MONOCYTES # BLD AUTO: 0.49 10*3/MM3 (ref 0.1–0.9)
MONOCYTES NFR BLD AUTO: 8.4 % (ref 5–12)
NEUTROPHILS NFR BLD AUTO: 3.8 10*3/MM3 (ref 1.7–7)
NEUTROPHILS NFR BLD AUTO: 65.2 % (ref 42.7–76)
NRBC BLD AUTO-RTO: 0 /100 WBC (ref 0–0.2)
NT-PROBNP SERPL-MCNC: 959 PG/ML (ref 0–900)
PLATELET # BLD AUTO: 291 10*3/MM3 (ref 140–450)
PMV BLD AUTO: 9.1 FL (ref 6–12)
POTASSIUM SERPL-SCNC: 4.5 MMOL/L (ref 3.5–5.2)
PROT SERPL-MCNC: 6.2 G/DL (ref 6–8.5)
PROTHROMBIN TIME: 12.1 SECONDS (ref 11.7–14.2)
RBC # BLD AUTO: 4.31 10*6/MM3 (ref 4.14–5.8)
SODIUM SERPL-SCNC: 139 MMOL/L (ref 136–145)
TRIGL SERPL-MCNC: 110 MG/DL (ref 0–150)
TROPONIN T SERPL-MCNC: 0.03 NG/ML (ref 0–0.03)
TROPONIN T SERPL-MCNC: 0.03 NG/ML (ref 0–0.03)
VLDLC SERPL-MCNC: 20 MG/DL (ref 5–40)
WBC NRBC COR # BLD: 5.83 10*3/MM3 (ref 3.4–10.8)
WHOLE BLOOD HOLD COAG: NORMAL
WHOLE BLOOD HOLD SPECIMEN: NORMAL

## 2023-01-10 PROCEDURE — 71275 CT ANGIOGRAPHY CHEST: CPT

## 2023-01-10 PROCEDURE — 80061 LIPID PANEL: CPT | Performed by: NURSE PRACTITIONER

## 2023-01-10 PROCEDURE — 80053 COMPREHEN METABOLIC PANEL: CPT | Performed by: EMERGENCY MEDICINE

## 2023-01-10 PROCEDURE — G0378 HOSPITAL OBSERVATION PER HR: HCPCS

## 2023-01-10 PROCEDURE — 85610 PROTHROMBIN TIME: CPT | Performed by: EMERGENCY MEDICINE

## 2023-01-10 PROCEDURE — 83880 ASSAY OF NATRIURETIC PEPTIDE: CPT | Performed by: EMERGENCY MEDICINE

## 2023-01-10 PROCEDURE — 85379 FIBRIN DEGRADATION QUANT: CPT | Performed by: EMERGENCY MEDICINE

## 2023-01-10 PROCEDURE — 84484 ASSAY OF TROPONIN QUANT: CPT | Performed by: NURSE PRACTITIONER

## 2023-01-10 PROCEDURE — 36415 COLL VENOUS BLD VENIPUNCTURE: CPT

## 2023-01-10 PROCEDURE — 71046 X-RAY EXAM CHEST 2 VIEWS: CPT

## 2023-01-10 PROCEDURE — 84484 ASSAY OF TROPONIN QUANT: CPT | Performed by: EMERGENCY MEDICINE

## 2023-01-10 PROCEDURE — 0 IOPAMIDOL PER 1 ML: Performed by: EMERGENCY MEDICINE

## 2023-01-10 PROCEDURE — 93010 ELECTROCARDIOGRAM REPORT: CPT | Performed by: INTERNAL MEDICINE

## 2023-01-10 PROCEDURE — 99285 EMERGENCY DEPT VISIT HI MDM: CPT

## 2023-01-10 PROCEDURE — 93005 ELECTROCARDIOGRAM TRACING: CPT

## 2023-01-10 PROCEDURE — 85025 COMPLETE CBC W/AUTO DIFF WBC: CPT | Performed by: EMERGENCY MEDICINE

## 2023-01-10 RX ORDER — LISINOPRIL 10 MG/1
10 TABLET ORAL DAILY
Status: DISCONTINUED | OUTPATIENT
Start: 2023-01-11 | End: 2023-01-12 | Stop reason: HOSPADM

## 2023-01-10 RX ORDER — PANTOPRAZOLE SODIUM 40 MG/1
40 TABLET, DELAYED RELEASE ORAL 2 TIMES DAILY
Status: DISCONTINUED | OUTPATIENT
Start: 2023-01-10 | End: 2023-01-12 | Stop reason: HOSPADM

## 2023-01-10 RX ORDER — DEXTROSE MONOHYDRATE 25 G/50ML
25 INJECTION, SOLUTION INTRAVENOUS
Status: DISCONTINUED | OUTPATIENT
Start: 2023-01-10 | End: 2023-01-12 | Stop reason: HOSPADM

## 2023-01-10 RX ORDER — ASPIRIN 81 MG/1
81 TABLET ORAL DAILY
Status: DISCONTINUED | OUTPATIENT
Start: 2023-01-11 | End: 2023-01-12 | Stop reason: HOSPADM

## 2023-01-10 RX ORDER — SODIUM CHLORIDE 0.9 % (FLUSH) 0.9 %
10 SYRINGE (ML) INJECTION AS NEEDED
Status: DISCONTINUED | OUTPATIENT
Start: 2023-01-10 | End: 2023-01-12 | Stop reason: HOSPADM

## 2023-01-10 RX ORDER — ATORVASTATIN CALCIUM 20 MG/1
40 TABLET, FILM COATED ORAL DAILY
Status: DISCONTINUED | OUTPATIENT
Start: 2023-01-11 | End: 2023-01-12 | Stop reason: HOSPADM

## 2023-01-10 RX ORDER — NICOTINE POLACRILEX 4 MG
15 LOZENGE BUCCAL
Status: DISCONTINUED | OUTPATIENT
Start: 2023-01-10 | End: 2023-01-12 | Stop reason: HOSPADM

## 2023-01-10 RX ORDER — ASPIRIN 325 MG
325 TABLET ORAL ONCE
Status: COMPLETED | OUTPATIENT
Start: 2023-01-10 | End: 2023-01-10

## 2023-01-10 RX ORDER — INSULIN LISPRO 100 [IU]/ML
0-7 INJECTION, SOLUTION INTRAVENOUS; SUBCUTANEOUS
Status: DISCONTINUED | OUTPATIENT
Start: 2023-01-11 | End: 2023-01-12 | Stop reason: HOSPADM

## 2023-01-10 RX ADMIN — IOPAMIDOL 95 ML: 755 INJECTION, SOLUTION INTRAVENOUS at 20:11

## 2023-01-10 RX ADMIN — PANTOPRAZOLE SODIUM 40 MG: 40 TABLET, DELAYED RELEASE ORAL at 23:19

## 2023-01-10 RX ADMIN — NITROGLYCERIN 1 INCH: 20 OINTMENT TOPICAL at 23:19

## 2023-01-10 RX ADMIN — ASPIRIN 325 MG: 325 TABLET ORAL at 18:05

## 2023-01-10 NOTE — ED PROVIDER NOTES
EMERGENCY DEPARTMENT ENCOUNTER    Room Number:  39/39  Date seen:  1/10/2023  PCP: Ofelia Hernandez APRN  Historian: Patient      HPI:  Chief Complaint: Chest pain    Context: Sadiq Aldana is a 62 y.o. male who presents to the ED c/o intermittent chest pain that radiates to both arms for the past several months but has been worse over the past several weeks.  The patient states that initially he noted it when walking and it would resolve with rest.  He states that now every time he walks he gets chest discomfort on the right side that radiates to his bilateral posterior arms.  He denies shortness of breath, nausea, diaphoresis or dizziness.  He denies calf pain or leg swelling.  The patient denies a history of coronary artery disease but states has had several negative stress test in the past.  He denies having a cardiologist.  He states he does have a history of high blood pressure, high cholesterol and diabetes.  He states that his mom did have heart disease.  He denies smoking.  He also states that at night he sometimes notices the pain when he rolls from side to side and is taken ibuprofen with some relief.  He describes the pain as a sharp or burning pain that sometimes is worse with deep breathing.  Currently the patient is asymptomatic      PAST MEDICAL HISTORY  Active Ambulatory Problems     Diagnosis Date Noted   • Type 2 diabetes mellitus without complication, with long-term current use of insulin ([facility]) 02/28/2020   • Mixed hyperlipidemia 02/28/2020   • Gastroesophageal reflux disease 02/28/2020   • Acute left-sided low back pain with left-sided sciatica 06/03/2020   • Routine adult health maintenance 01/22/2021   • Artificial lens present 05/20/2021   • Onychomycosis 06/02/2022   • Hypertension 06/02/2022     Resolved Ambulatory Problems     Diagnosis Date Noted   • No Resolved Ambulatory Problems     Past Medical History:   Diagnosis Date   • Cholelithiasis    • Depression 6/22   • Diabetes mellitus  ([facility])    • Erectile dysfunction    • GERD (gastroesophageal reflux disease)    • Hyperlipidemia    • Obstructive sleep apnea syndrome    • Peptic ulceration          REVIEW OF SYSTEMS  All systems reviewed and negative except for those discussed in HPI.       PAST SURGICAL HISTORY  Past Surgical History:   Procedure Laterality Date   • CHOLECYSTECTOMY     • FRACTURE SURGERY     • SHOULDER ARTHROSCOPY Bilateral     removal of bone spurs         FAMILY HISTORY  Family History   Problem Relation Age of Onset   • Cancer Mother    • Heart disease Mother    • Other Father    • No Known Problems Sister    • No Known Problems Sister    • No Known Problems Sister    • No Known Problems Sister    • Cancer Sister    • Other Sister    • Other Sister    • Cancer Brother         throat         SOCIAL HISTORY  Social History     Socioeconomic History   • Marital status:    Tobacco Use   • Smoking status: Never   • Smokeless tobacco: Never   Vaping Use   • Vaping Use: Never used   Substance and Sexual Activity   • Alcohol use: Yes     Comment: Less than 2 a month   • Drug use: Never   • Sexual activity: Yes     Partners: Female         ALLERGIES  Patient has no known allergies.      PHYSICAL EXAM  ED Triage Vitals   Temp Heart Rate Resp BP SpO2   01/10/23 1645 01/10/23 1645 01/10/23 1645 01/10/23 1703 01/10/23 1645   97.4 °F (36.3 °C) 90 16 137/79 98 %      Temp src Heart Rate Source Patient Position BP Location FiO2 (%)   01/10/23 1645 01/10/23 1645 -- -- --   Tympanic Monitor          Physical Exam      GENERAL: 62-year-old in no acute distress  HENT: NCAT: nares patent: Neck supple  EYES: no scleral icterus  CV: regular rhythm, normal rate  RESPIRATORY: normal effort  ABDOMEN: soft, NTND: Bowel sounds positive  MUSCULOSKELETAL: no deformity  NEURO: alert with nonfocal neuro exam  PSYCH:  calm, cooperative  SKIN: warm, dry    Vital signs and nursing notes reviewed.    PPE pt does not present with symptoms for COVID19;  however, I was wearing a mask and goggles throughout all patient interaction.    LAB RESULTS  Recent Results (from the past 24 hour(s))   ECG 12 Lead Chest Pain    Collection Time: 01/10/23  4:50 PM   Result Value Ref Range    QT Interval 377 ms   Comprehensive Metabolic Panel    Collection Time: 01/10/23  5:18 PM    Specimen: Blood   Result Value Ref Range    Glucose 227 (H) 65 - 99 mg/dL    BUN 25 (H) 8 - 23 mg/dL    Creatinine 1.22 0.76 - 1.27 mg/dL    Sodium 139 136 - 145 mmol/L    Potassium 4.5 3.5 - 5.2 mmol/L    Chloride 103 98 - 107 mmol/L    CO2 24.5 22.0 - 29.0 mmol/L    Calcium 9.0 8.6 - 10.5 mg/dL    Total Protein 6.2 6.0 - 8.5 g/dL    Albumin 4.0 3.5 - 5.2 g/dL    ALT (SGPT) 12 1 - 41 U/L    AST (SGOT) 15 1 - 40 U/L    Alkaline Phosphatase 75 39 - 117 U/L    Total Bilirubin 0.2 0.0 - 1.2 mg/dL    Globulin 2.2 gm/dL    A/G Ratio 1.8 g/dL    BUN/Creatinine Ratio 20.5 7.0 - 25.0    Anion Gap 11.5 5.0 - 15.0 mmol/L    eGFR 67.0 >60.0 mL/min/1.73   Troponin    Collection Time: 01/10/23  5:18 PM    Specimen: Blood   Result Value Ref Range    Troponin T 0.034 (C) 0.000 - 0.030 ng/mL   Green Top (Gel)    Collection Time: 01/10/23  5:18 PM   Result Value Ref Range    Extra Tube Hold for add-ons.    Lavender Top    Collection Time: 01/10/23  5:18 PM   Result Value Ref Range    Extra Tube hold for add-on    Gold Top - SST    Collection Time: 01/10/23  5:18 PM   Result Value Ref Range    Extra Tube Hold for add-ons.    Light Blue Top    Collection Time: 01/10/23  5:18 PM   Result Value Ref Range    Extra Tube Hold for add-ons.    CBC Auto Differential    Collection Time: 01/10/23  5:18 PM    Specimen: Blood   Result Value Ref Range    WBC 5.83 3.40 - 10.80 10*3/mm3    RBC 4.31 4.14 - 5.80 10*6/mm3    Hemoglobin 13.0 13.0 - 17.7 g/dL    Hematocrit 39.5 37.5 - 51.0 %    MCV 91.6 79.0 - 97.0 fL    MCH 30.2 26.6 - 33.0 pg    MCHC 32.9 31.5 - 35.7 g/dL    RDW 12.3 12.3 - 15.4 %    RDW-SD 41.1 37.0 - 54.0 fl    MPV 9.1  6.0 - 12.0 fL    Platelets 291 140 - 450 10*3/mm3    Neutrophil % 65.2 42.7 - 76.0 %    Lymphocyte % 23.3 19.6 - 45.3 %    Monocyte % 8.4 5.0 - 12.0 %    Eosinophil % 1.9 0.3 - 6.2 %    Basophil % 0.9 0.0 - 1.5 %    Immature Grans % 0.3 0.0 - 0.5 %    Neutrophils, Absolute 3.80 1.70 - 7.00 10*3/mm3    Lymphocytes, Absolute 1.36 0.70 - 3.10 10*3/mm3    Monocytes, Absolute 0.49 0.10 - 0.90 10*3/mm3    Eosinophils, Absolute 0.11 0.00 - 0.40 10*3/mm3    Basophils, Absolute 0.05 0.00 - 0.20 10*3/mm3    Immature Grans, Absolute 0.02 0.00 - 0.05 10*3/mm3    nRBC 0.0 0.0 - 0.2 /100 WBC   Protime-INR    Collection Time: 01/10/23  5:18 PM    Specimen: Blood   Result Value Ref Range    Protime 12.1 11.7 - 14.2 Seconds    INR 0.89 (L) 0.90 - 1.10   D-dimer, Quantitative    Collection Time: 01/10/23  5:18 PM    Specimen: Blood   Result Value Ref Range    D-Dimer, Quantitative 0.75 (H) 0.00 - 0.62 MCGFEU/mL   BNP    Collection Time: 01/10/23  5:18 PM    Specimen: Blood   Result Value Ref Range    proBNP 959.0 (H) 0.0 - 900.0 pg/mL   Troponin    Collection Time: 01/10/23  7:13 PM    Specimen: Blood   Result Value Ref Range    Troponin T 0.025 0.000 - 0.030 ng/mL   Lipid Panel    Collection Time: 01/10/23  7:13 PM    Specimen: Blood   Result Value Ref Range    Total Cholesterol 133 0 - 200 mg/dL    Triglycerides 110 0 - 150 mg/dL    HDL Cholesterol 46 40 - 60 mg/dL    LDL Cholesterol  67 0 - 100 mg/dL    VLDL Cholesterol 20 5 - 40 mg/dL    LDL/HDL Ratio 1.41        Ordered the above labs and reviewed the results.        RADIOLOGY  XR Chest 2 View    Result Date: 1/10/2023  CHEST: 2 VIEWS  HISTORY: Chest pain  COMPARISON: None  FINDINGS:Cardiomediastinal silhouette is normal. Lungs are clear and there is no evidence for pulmonary edema or pleural effusion. There is multilevel endplate spur formation within the thoracic spine. Cholecystectomy clips are present.      No evidence for active disease in the chest.  This report was  finalized on 1/10/2023 5:58 PM by Dr. Xander Gonsales M.D.      CT Angiogram Chest    Result Date: 1/10/2023  CT ANGIOGRAM OF THE CHEST. MULTIPLE CORONAL, SAGITTAL, AND 3-D RECONSTRUCTIONS.  HISTORY: Shortness of breath  TECHNIQUE: Radiation dose reduction techniques were utilized, including automated exposure control and exposure modulation based on body size. CT angiogram of the chest was performed following the administration of IV contrast. Coronal, sagittal, and 3-D reconstruction images were obtained.  COMPARISON:  Two-view chest 01/10/2023  FINDINGS: There is no intrapulmonary arterial filling defect to diagnose a pulmonary embolus. Thoracic aorta exhibits normal size.  Within the anterior right lower lobe there is a nodule that measures 1.1 x 0.9 cm axial dimension and extends 1.2 cm in oblique height when measured in the sagittal plane. Nodule is just above the posterior margin of the dominant of the diaphragm. There is also a mildly enlarged right infrahilar lymph node that measures approximately 1.4 x 1 cm. There is no further evidence for mediastinal barbara enlargement and no left hilar barbara enlargement is demonstrated. No endotracheal or central intrabronchial lesion is demonstrated.  There is elevation of the right hemidiaphragm. There has been previous cholecystectomy. Within the lateral right interpolar kidney there is a low-density lesion that is most likely a cyst and measures 1.5 cm.      1. 1.2 cm nodular opacity within the right lower lobe just above the dome of the diaphragm. There is also mild right infrahilar barbara enlargement. Attempt at characterization could be performed with CT. 2. No evidence for pulmonary thromboembolic disease. 3. Previous cholecystectomy.  Discussed with Dr. Barros in the emergency department on 01/10/2023 at 8:30 PM.  This report was finalized on 1/10/2023 8:47 PM by Dr. Xander Gonsales M.D.        Ordered the above noted radiological studies. Reviewed by me in  PACS.            PROCEDURES  Procedures          MEDICATIONS GIVEN IN ER  Medications   sodium chloride 0.9 % flush 10 mL (has no administration in time range)   sodium chloride 0.9 % flush 10 mL (has no administration in time range)   dextrose (GLUTOSE) oral gel 15 g (has no administration in time range)   dextrose (D50W) (25 g/50 mL) IV injection 25 g (has no administration in time range)   glucagon (human recombinant) (GLUCAGEN DIAGNOSTIC) injection 1 mg (has no administration in time range)   insulin lispro (ADMELOG) injection 0-7 Units (has no administration in time range)   nitroglycerin (NITROSTAT) ointment 1 inch (has no administration in time range)   aspirin tablet 325 mg (325 mg Oral Given 1/10/23 1805)   iopamidol (ISOVUE-370) 76 % injection 100 mL (95 mL Intravenous Given 1/10/23 2011)             MEDICAL DECISION MAKING, PROGRESS, and CONSULTS    All labs have been independently reviewed by me.  All radiology studies have been reviewed by me and I have also reviewed the radiology report.   EKG's independently viewed and interpreted by me.  Discussion below represents my analysis of pertinent findings related to patient's condition, differential diagnosis, treatment plan and final disposition.      Additional sources:    - Shared decision making: After shared decision-making discussion with the patient and cardiologist will admit the patient to the hospital for further evaluation and care      Orders placed during this visit:  Orders Placed This Encounter   Procedures   • XR Chest 2 View   • CT Angiogram Chest   • [place] Draw   • Comprehensive Metabolic Panel   • Troponin   • CBC Auto Differential   • Protime-INR   • D-dimer, Quantitative   • BNP   • Basic Metabolic Panel   • CBC (No Diff)   • Lipid Panel   • Troponin   • Troponin   • Diet: Regular/House Diet; Texture: Regular Texture (IDDSI 7); Fluid Consistency: Thin (IDDSI 0)   • NPO Diet NPO Type: Strict NPO   • Undress and Gown   • Monitor Blood  Pressure   • Vital Signs   • Oral Care   • Vital Signs Every 15 Minutes Until Stable, Then Every 4 Hours   • Cardiac Monitoring   • Pulse Oximetry, Continuous   • Notify Physician For Unrelieved Chest Pain   • Place Sequential Compression Device   • Maintain Sequential Compression Device   • Do NOT Hold Basal or Correction Scale Insulin When Patient is NPO, Hold Scheduled Mealtime (Bolus) Insulin if NPO   • Code Status and Medical Interventions:   • LCG (on-call MD unless specified)   • Inpatient Cardiology Consult   • Oxygen Therapy- Nasal Cannula; Titrate for SPO2: 90% - 95%   • POC Glucose TID AC   • ECG 12 Lead Chest Pain   • ECG 12 Lead   • ECG 12 Lead Chest Pain   • Insert Peripheral IV   • Insert Peripheral IV   • Initiate ED Observation Status   • CBC & Differential   • Green Top (Gel)   • Lavender Top   • Gold Top - SST   • Light Blue Top         Differential diagnosis:  My differential diagnosis includes but is not limited to myocardial infarction, acute coronary syndrome, pericarditis, chest wall pain, pneumonia, pulmonary embolism, pneumothorax, or esophageal spasm.      Independent interpretation of labs, radiology studies, and discussions with consultants:  ED Course as of 01/10/23 2200   Tue Roney 10, 2023   1747 EKG    EKG time: 1650  Rhythm/Rate: Normal sinus rhythm at 78  No Acute Ischemia  Non-Specific ST-T changes  No old EKG for comparison    Interpreted Contemporaneously by me.  Independently viewed by me     [GP]   1747 My interpretation the patient's chest x-ray is negative acute [GP]   1747 I will treat the patient with aspirin while obtaining labs, EKG and chest x-ray for further evaluation. [GP]   1827 Patient's troponin and D-dimer are elevated.  I will obtain a CTA of the chest and give the patient IV fluid bolus. [GP]   2017 Patient's repeat troponin is in normal level.  I am still awaiting the patient's CTA chest report. [GP]   2028 I discussed the patient's CTA chest with Dr. Gonsales  from radiology.  He states the patient has no pulmonary embolism but does have a right lung nodule with hilar node that will need follow-up. [GP]   2055 Currently the patient states he is pain-free.  He states his last episode of pain was approximately 4-1/2 hours ago when he walked into the emergency room from his car. [GP]   2055 The patient's been given aspirin thus far. [GP]   2105 I discussed the case with Dr. Barcenas from cardiology.  He has asked me to admit the patient to the observation overnight tonight for likely heart catheterization tomorrow.  He is aware the patient has had aspirin and is currently pain-free and does not recommend any further therapy at this time. [GP]   2108 I discussed the case with Alli Back from the observation unit who is aware of my consultation with cardiology and will admit the patient overnight for likely cardiac catheterization in the morning.  I have discussed the above with the patient and he understands and agrees with the plan. [GP]      ED Course User Index  [GP] Xander Barros MD               DIAGNOSIS  Final diagnoses:   Chest pain, unspecified type   Nodule of right lung   Type 2 diabetes mellitus without complication, with long-term current use of insulin (HCC)   Mixed hyperlipidemia   Primary hypertension         DISPOSITION  ADMISSION    Discussed treatment plan and reason for admission with pt/family and admitting physician.  Pt/family voiced understanding of the plan for admission for further testing/treatment as needed.            Latest Documented Vital Signs:  As of 22:00 EST  BP- 144/74 HR- 76 Temp- 98.2 °F (36.8 °C) (Oral) O2 sat- 97%--      --------------------  Please note that portions of this were completed with a voice recognition program.       Note Disclaimer: At [facility], we believe that sharing information builds trust and better relationships. You are receiving this note because you are receiving care at [facility] or recently  visited. It is possible you will see health information before a provider has talked with you about it. This kind of information can be easy to misunderstand. To help you fully understand what it means for your health, we urge you to discuss this note with your provider.           Xander Barros MD  01/10/23 3887

## 2023-01-10 NOTE — LETTER
[facility] CASE MAN  [place] [facility] [id number]  [phone]        January 12, 2023      Patient: Sadiq Aldana  YOB: 1960  Date of Visit: 1/10/2023      ATTENTION INPATIENT AUTH REQUEST MEMBER ID [id number],  AUTH REQUEST FORM MAY APPEAR AT END OF THIS FAX, REPLY TO   [facility] DEPT  [phone] OR CALL            Emma Alexandra LPN

## 2023-01-10 NOTE — Clinical Note
First balloon inflation max pressure = 8 kurt. First balloon inflation duration = 12 seconds. Second inflation of balloon - Max pressure = 8 kurt. 2nd Inflation of balloon - Duration = 10 seconds. 2nd inflation was done at 15:59 EST. The balloon is positioned in the Distal segment of the vessel. Third inflation of balloon - Max pressure = 8 kurt. 3rd Inflation of balloon - Duration = 9 seconds. 3rd inflation was done at 15:59 EST. The ball oon is positioned in the Distal segment of the vessel.

## 2023-01-10 NOTE — Clinical Note
First balloon inflation max pressure = 8 kurt. First balloon inflation duration = 14 seconds. Second inflation of balloon - Max pressure = 12 kurt. 2nd Inflation of balloon - Duration = 14 seconds. 2nd inflation was done at 16:33 EST. The balloon is positioned in the Distal segment of the vessel.

## 2023-01-10 NOTE — ED NOTES
Pt states since October he has had CP on and off. Pt states he then started to get bilateral arm pain above the elbow with the CP two weeks ago. Pt states that since two weeks ago he has had the CP everyday that can last from a min to 5-10min. Pt states that he has CP when he is active and sleeping. Pt denies SOB.

## 2023-01-10 NOTE — Clinical Note
First balloon inflation max pressure = 12 kurt. First balloon inflation duration = 20 seconds. Second inflation of balloon - Max pressure = 16 kurt. 2nd Inflation of balloon - Duration = 10 seconds. 2nd inflation was done at 16:47 EST. The balloon is positioned in the Proximal segment of the vessel. Third inflation of balloon - Max pressure = 20 kurt. 3rd Inflation of balloon - Duration = 8 seconds. 3rd inflation was done at 16:47 EST. The  balloon is positioned in the Proximal segment of the vessel.

## 2023-01-10 NOTE — Clinical Note
Hemostasis started on the right radial artery. R-Band was used in achieving hemostasis. Radial compression device applied to vessel. Hemostasis achieved successfully. Closure device additional comment: TR band 14cc of air

## 2023-01-10 NOTE — ED TRIAGE NOTES
Pt to er via pv c/o cp radiating to both arms since October getting worse in last couple of weeks.     Pt and RN wearing mask throughout encounter.

## 2023-01-11 PROBLEM — I20.0 UNSTABLE ANGINA: Status: ACTIVE | Noted: 2023-01-10

## 2023-01-11 LAB
ACT BLD: 263 SECONDS (ref 82–152)
ACT BLD: 281 SECONDS (ref 82–152)
ACT BLD: 341 SECONDS (ref 82–152)
ANION GAP SERPL CALCULATED.3IONS-SCNC: 7.1 MMOL/L (ref 5–15)
BUN SERPL-MCNC: 18 MG/DL (ref 8–23)
BUN/CREAT SERPL: 21.4 (ref 7–25)
CALCIUM SPEC-SCNC: 8.8 MG/DL (ref 8.6–10.5)
CHLORIDE SERPL-SCNC: 108 MMOL/L (ref 98–107)
CO2 SERPL-SCNC: 25.9 MMOL/L (ref 22–29)
CREAT SERPL-MCNC: 0.84 MG/DL (ref 0.76–1.27)
DEPRECATED RDW RBC AUTO: 42.2 FL (ref 37–54)
EGFRCR SERPLBLD CKD-EPI 2021: 98.6 ML/MIN/1.73
ERYTHROCYTE [DISTWIDTH] IN BLOOD BY AUTOMATED COUNT: 12.8 % (ref 12.3–15.4)
GLUCOSE BLDC GLUCOMTR-MCNC: 113 MG/DL (ref 70–130)
GLUCOSE BLDC GLUCOMTR-MCNC: 130 MG/DL (ref 70–130)
GLUCOSE BLDC GLUCOMTR-MCNC: 377 MG/DL (ref 70–130)
GLUCOSE SERPL-MCNC: 148 MG/DL (ref 65–99)
HCT VFR BLD AUTO: 37.2 % (ref 37.5–51)
HGB BLD-MCNC: 12.5 G/DL (ref 13–17.7)
MCH RBC QN AUTO: 30.3 PG (ref 26.6–33)
MCHC RBC AUTO-ENTMCNC: 33.6 G/DL (ref 31.5–35.7)
MCV RBC AUTO: 90.1 FL (ref 79–97)
PLATELET # BLD AUTO: 276 10*3/MM3 (ref 140–450)
PMV BLD AUTO: 9 FL (ref 6–12)
POTASSIUM SERPL-SCNC: 4 MMOL/L (ref 3.5–5.2)
QT INTERVAL: 377 MS
QT INTERVAL: 386 MS
RBC # BLD AUTO: 4.13 10*6/MM3 (ref 4.14–5.8)
SODIUM SERPL-SCNC: 141 MMOL/L (ref 136–145)
TROPONIN T SERPL-MCNC: 0.02 NG/ML (ref 0–0.03)
TROPONIN T SERPL-MCNC: 0.02 NG/ML (ref 0–0.03)
WBC NRBC COR # BLD: 5.86 10*3/MM3 (ref 3.4–10.8)

## 2023-01-11 PROCEDURE — B2151ZZ FLUOROSCOPY OF LEFT HEART USING LOW OSMOLAR CONTRAST: ICD-10-PCS | Performed by: INTERNAL MEDICINE

## 2023-01-11 PROCEDURE — 93010 ELECTROCARDIOGRAM REPORT: CPT | Performed by: INTERNAL MEDICINE

## 2023-01-11 PROCEDURE — C1769 GUIDE WIRE: HCPCS | Performed by: INTERNAL MEDICINE

## 2023-01-11 PROCEDURE — 25010000002 MIDAZOLAM PER 1 MG: Performed by: INTERNAL MEDICINE

## 2023-01-11 PROCEDURE — 99153 MOD SED SAME PHYS/QHP EA: CPT | Performed by: INTERNAL MEDICINE

## 2023-01-11 PROCEDURE — 84484 ASSAY OF TROPONIN QUANT: CPT | Performed by: NURSE PRACTITIONER

## 2023-01-11 PROCEDURE — C9600 PERC DRUG-EL COR STENT SING: HCPCS | Performed by: INTERNAL MEDICINE

## 2023-01-11 PROCEDURE — 93458 L HRT ARTERY/VENTRICLE ANGIO: CPT | Performed by: INTERNAL MEDICINE

## 2023-01-11 PROCEDURE — 85347 COAGULATION TIME ACTIVATED: CPT

## 2023-01-11 PROCEDURE — 99204 OFFICE O/P NEW MOD 45 MIN: CPT | Performed by: STUDENT IN AN ORGANIZED HEALTH CARE EDUCATION/TRAINING PROGRAM

## 2023-01-11 PROCEDURE — 82962 GLUCOSE BLOOD TEST: CPT

## 2023-01-11 PROCEDURE — C1725 CATH, TRANSLUMIN NON-LASER: HCPCS | Performed by: INTERNAL MEDICINE

## 2023-01-11 PROCEDURE — 027034Z DILATION OF CORONARY ARTERY, ONE ARTERY WITH DRUG-ELUTING INTRALUMINAL DEVICE, PERCUTANEOUS APPROACH: ICD-10-PCS | Performed by: INTERNAL MEDICINE

## 2023-01-11 PROCEDURE — B2111ZZ FLUOROSCOPY OF MULTIPLE CORONARY ARTERIES USING LOW OSMOLAR CONTRAST: ICD-10-PCS | Performed by: INTERNAL MEDICINE

## 2023-01-11 PROCEDURE — C1887 CATHETER, GUIDING: HCPCS | Performed by: INTERNAL MEDICINE

## 2023-01-11 PROCEDURE — 99152 MOD SED SAME PHYS/QHP 5/>YRS: CPT | Performed by: INTERNAL MEDICINE

## 2023-01-11 PROCEDURE — 63710000001 INSULIN LISPRO (HUMAN) PER 5 UNITS: Performed by: INTERNAL MEDICINE

## 2023-01-11 PROCEDURE — 0 IOPAMIDOL PER 1 ML: Performed by: INTERNAL MEDICINE

## 2023-01-11 PROCEDURE — 92921: CPT | Performed by: INTERNAL MEDICINE

## 2023-01-11 PROCEDURE — G0378 HOSPITAL OBSERVATION PER HR: HCPCS

## 2023-01-11 PROCEDURE — C1894 INTRO/SHEATH, NON-LASER: HCPCS | Performed by: INTERNAL MEDICINE

## 2023-01-11 PROCEDURE — 25010000002 KETOROLAC TROMETHAMINE PER 15 MG: Performed by: PHYSICIAN ASSISTANT

## 2023-01-11 PROCEDURE — 4A023N7 MEASUREMENT OF CARDIAC SAMPLING AND PRESSURE, LEFT HEART, PERCUTANEOUS APPROACH: ICD-10-PCS | Performed by: INTERNAL MEDICINE

## 2023-01-11 PROCEDURE — C1874 STENT, COATED/COV W/DEL SYS: HCPCS | Performed by: INTERNAL MEDICINE

## 2023-01-11 PROCEDURE — 92928 PRQ TCAT PLMT NTRAC ST 1 LES: CPT | Performed by: INTERNAL MEDICINE

## 2023-01-11 PROCEDURE — 85027 COMPLETE CBC AUTOMATED: CPT | Performed by: NURSE PRACTITIONER

## 2023-01-11 PROCEDURE — 25010000002 HEPARIN (PORCINE) PER 1000 UNITS: Performed by: INTERNAL MEDICINE

## 2023-01-11 PROCEDURE — 93005 ELECTROCARDIOGRAM TRACING: CPT | Performed by: NURSE PRACTITIONER

## 2023-01-11 PROCEDURE — 80048 BASIC METABOLIC PNL TOTAL CA: CPT | Performed by: NURSE PRACTITIONER

## 2023-01-11 PROCEDURE — 25010000002 FENTANYL CITRATE (PF) 50 MCG/ML SOLUTION: Performed by: INTERNAL MEDICINE

## 2023-01-11 DEVICE — XIENCE SKYPOINT™ EVEROLIMUS ELUTING CORONARY STENT SYSTEM 3.00 MM X 28 MM / RAPID-EXCHANGE
Type: IMPLANTABLE DEVICE | Status: FUNCTIONAL
Brand: XIENCE SKYPOINT™

## 2023-01-11 RX ORDER — VERAPAMIL HYDROCHLORIDE 2.5 MG/ML
INJECTION, SOLUTION INTRAVENOUS
Status: DISCONTINUED | OUTPATIENT
Start: 2023-01-11 | End: 2023-01-11 | Stop reason: HOSPADM

## 2023-01-11 RX ORDER — HEPARIN SODIUM 1000 [USP'U]/ML
INJECTION, SOLUTION INTRAVENOUS; SUBCUTANEOUS
Status: DISCONTINUED | OUTPATIENT
Start: 2023-01-11 | End: 2023-01-11 | Stop reason: HOSPADM

## 2023-01-11 RX ORDER — MIDAZOLAM HYDROCHLORIDE 1 MG/ML
INJECTION INTRAMUSCULAR; INTRAVENOUS
Status: DISCONTINUED | OUTPATIENT
Start: 2023-01-11 | End: 2023-01-11 | Stop reason: HOSPADM

## 2023-01-11 RX ORDER — NALOXONE HCL 0.4 MG/ML
0.4 VIAL (ML) INJECTION
Status: DISCONTINUED | OUTPATIENT
Start: 2023-01-11 | End: 2023-01-12 | Stop reason: HOSPADM

## 2023-01-11 RX ORDER — HYDROCODONE BITARTRATE AND ACETAMINOPHEN 5; 325 MG/1; MG/1
1 TABLET ORAL EVERY 4 HOURS PRN
Status: DISCONTINUED | OUTPATIENT
Start: 2023-01-11 | End: 2023-01-12 | Stop reason: HOSPADM

## 2023-01-11 RX ORDER — MORPHINE SULFATE 2 MG/ML
2 INJECTION, SOLUTION INTRAMUSCULAR; INTRAVENOUS EVERY 4 HOURS PRN
Status: DISCONTINUED | OUTPATIENT
Start: 2023-01-11 | End: 2023-01-12 | Stop reason: HOSPADM

## 2023-01-11 RX ORDER — FENTANYL CITRATE 50 UG/ML
INJECTION, SOLUTION INTRAMUSCULAR; INTRAVENOUS
Status: DISCONTINUED | OUTPATIENT
Start: 2023-01-11 | End: 2023-01-11 | Stop reason: HOSPADM

## 2023-01-11 RX ORDER — LIDOCAINE HYDROCHLORIDE 20 MG/ML
INJECTION, SOLUTION INFILTRATION; PERINEURAL
Status: DISCONTINUED | OUTPATIENT
Start: 2023-01-11 | End: 2023-01-11 | Stop reason: HOSPADM

## 2023-01-11 RX ORDER — ONDANSETRON 2 MG/ML
4 INJECTION INTRAMUSCULAR; INTRAVENOUS EVERY 6 HOURS PRN
Status: DISCONTINUED | OUTPATIENT
Start: 2023-01-11 | End: 2023-01-12 | Stop reason: HOSPADM

## 2023-01-11 RX ORDER — SODIUM CHLORIDE 9 MG/ML
INJECTION, SOLUTION INTRAVENOUS
Status: COMPLETED | OUTPATIENT
Start: 2023-01-11 | End: 2023-01-11

## 2023-01-11 RX ORDER — ACETAMINOPHEN 325 MG/1
650 TABLET ORAL EVERY 4 HOURS PRN
Status: DISCONTINUED | OUTPATIENT
Start: 2023-01-11 | End: 2023-01-12 | Stop reason: HOSPADM

## 2023-01-11 RX ORDER — SODIUM CHLORIDE 9 MG/ML
100 INJECTION, SOLUTION INTRAVENOUS CONTINUOUS
Status: ACTIVE | OUTPATIENT
Start: 2023-01-11 | End: 2023-01-11

## 2023-01-11 RX ORDER — KETOROLAC TROMETHAMINE 15 MG/ML
15 INJECTION, SOLUTION INTRAMUSCULAR; INTRAVENOUS ONCE
Status: COMPLETED | OUTPATIENT
Start: 2023-01-11 | End: 2023-01-11

## 2023-01-11 RX ORDER — ALUMINA, MAGNESIA, AND SIMETHICONE 2400; 2400; 240 MG/30ML; MG/30ML; MG/30ML
15 SUSPENSION ORAL EVERY 6 HOURS PRN
Status: DISCONTINUED | OUTPATIENT
Start: 2023-01-11 | End: 2023-01-12 | Stop reason: HOSPADM

## 2023-01-11 RX ORDER — PRASUGREL 10 MG/1
10 TABLET, FILM COATED ORAL DAILY
Status: DISCONTINUED | OUTPATIENT
Start: 2023-01-12 | End: 2023-01-12 | Stop reason: HOSPADM

## 2023-01-11 RX ORDER — ASPIRIN 81 MG/1
TABLET, CHEWABLE ORAL
Status: DISCONTINUED | OUTPATIENT
Start: 2023-01-11 | End: 2023-01-11 | Stop reason: HOSPADM

## 2023-01-11 RX ORDER — ONDANSETRON 4 MG/1
4 TABLET, FILM COATED ORAL EVERY 6 HOURS PRN
Status: DISCONTINUED | OUTPATIENT
Start: 2023-01-11 | End: 2023-01-12 | Stop reason: HOSPADM

## 2023-01-11 RX ORDER — PRASUGREL 10 MG/1
TABLET, FILM COATED ORAL
Status: DISCONTINUED | OUTPATIENT
Start: 2023-01-11 | End: 2023-01-11 | Stop reason: HOSPADM

## 2023-01-11 RX ADMIN — KETOROLAC TROMETHAMINE 15 MG: 15 INJECTION, SOLUTION INTRAMUSCULAR; INTRAVENOUS at 11:58

## 2023-01-11 RX ADMIN — HYDROCODONE BITARTRATE AND ACETAMINOPHEN 1 TABLET: 5; 325 TABLET ORAL at 17:31

## 2023-01-11 RX ADMIN — NITROGLYCERIN 1 INCH: 20 OINTMENT TOPICAL at 17:57

## 2023-01-11 RX ADMIN — PANTOPRAZOLE SODIUM 40 MG: 40 TABLET, DELAYED RELEASE ORAL at 21:03

## 2023-01-11 RX ADMIN — INSULIN LISPRO 6 UNITS: 100 INJECTION, SOLUTION INTRAVENOUS; SUBCUTANEOUS at 21:08

## 2023-01-11 RX ADMIN — ATORVASTATIN CALCIUM 40 MG: 20 TABLET, FILM COATED ORAL at 21:03

## 2023-01-11 RX ADMIN — PANTOPRAZOLE SODIUM 40 MG: 40 TABLET, DELAYED RELEASE ORAL at 08:51

## 2023-01-11 RX ADMIN — SODIUM CHLORIDE 100 ML/HR: 9 INJECTION, SOLUTION INTRAVENOUS at 17:36

## 2023-01-11 RX ADMIN — LISINOPRIL 10 MG: 10 TABLET ORAL at 08:51

## 2023-01-11 NOTE — CONSULTS
Date of Hospital Visit: 23  Encounter Provider: Devon Cristobal MD  Place of Service: [facility] CARDIOLOGY  Patient Name: Sadiq Aldana  :1960  Referral Provider: No ref. provider found    Chief complaint  Chest pain    History of Present Illness  62-year-old man with hypertension, hyperlipidemia, diabetes (A1c 8) who presented with progressively worsening exertional chest pain.  Patient has been experiencing chest pain since October.  Initially, his chest pain was bothersome with significant exertion however more recently he notes chest pain with minimal exertion.  Yesterday, he was having significant substernal chest discomfort walking to his car and decided that because of the increased frequency of his pain he should be evaluated in the ED.  He denies any shortness of breath, nausea, diaphoresis.  He does report radiation of the discomfort to the back of both arms.  Of note, he had COVID in December, and feels that his symptoms may have progressed since then.    On arrival to the ED, she was afebrile, pulse was 90, blood pressure 137/79, O2 saturations 98 on room air.  EKG with normal sinus rhythm, nonspecific ST-T changes inferolaterally.  Troponin T 0.034-> 0.025-> 0.021-> 0.016.    Past Medical History:   Diagnosis Date   • Cholelithiasis     Removed   • Depression     Cwife   • Diabetes mellitus (HCC)    • Erectile dysfunction    • GERD (gastroesophageal reflux disease)    • Hyperlipidemia    • Hypertension    • Obstructive sleep apnea syndrome    • Peptic ulceration        Past Surgical History:   Procedure Laterality Date   • CHOLECYSTECTOMY     • FRACTURE SURGERY     • SHOULDER ARTHROSCOPY Bilateral     removal of bone spurs       Medications Prior to Admission   Medication Sig Dispense Refill Last Dose   • aspirin 81 MG EC tablet Take 1 tablet by mouth Daily. 90 tablet 4 1/10/2023   • atorvastatin (Lipitor) 40 MG tablet Take 1 tablet by mouth Daily. 30 tablet 5  1/9/2023   • cholecalciferol (VITAMIN D3) 10 MCG (400 UNIT) tablet Take 1 tablet by mouth Daily. 90 tablet 4 1/9/2023   • Continuous Blood Gluc Sensor (FreeStyle Eulalio 14 Day Sensor) misc 1 Units 3 (Three) Times a Day. 9 each 9 1/10/2023   • Jardiance 25 MG tablet tablet TAKE ONE TABLET BY MOUTH DAILY 30 tablet 2 1/10/2023   • lisinopril (PRINIVIL,ZESTRIL) 10 MG tablet Take 1 tablet by mouth Daily. 30 tablet 5 1/10/2023   • metFORMIN (GLUCOPHAGE) 500 MG tablet TAKE TWO TABLETS BY MOUTH TWICE A DAY WITH A MEAL 120 tablet 2 1/10/2023   • pantoprazole (PROTONIX) 40 MG EC tablet Take 1 tablet by mouth 2 (Two) Times a Day. 180 tablet 1 1/10/2023   • sildenafil (VIAGRA) 25 MG tablet TAKE TWO TABLETS BY MOUTH DAILY AS NEEDED FOR ERECTILE DYSFUNCTION 90 tablet 0 Past Month   • Tresiba FlexTouch 100 UNIT/ML solution pen-injector injection Inject 32 Units under the skin into the appropriate area as directed Every Night. Doing in am now 200 mL 1 1/10/2023   • Trulicity 3 MG/0.5ML solution pen-injector Inject 0.5 mL as directed 1 (One) Time Per Week. 2 mL 6 Past Month       Current Meds  Scheduled Meds:aspirin, 81 mg, Oral, Daily  atorvastatin, 40 mg, Oral, Daily  [START ON 1/12/2023] insulin glargine, 32 Units, Subcutaneous, Nightly  insulin lispro, 0-7 Units, Subcutaneous, TID AC  lisinopril, 10 mg, Oral, Daily  nitroglycerin, 1 inch, Topical, Q6H  pantoprazole, 40 mg, Oral, BID      Continuous Infusions:   PRN Meds:.•  dextrose  •  dextrose  •  glucagon (human recombinant)  •  sodium chloride  •  [COMPLETED] Insert Peripheral IV **AND** sodium chloride    Allergies as of 01/10/2023   • (No Known Allergies)       Social History     Socioeconomic History   • Marital status:    Tobacco Use   • Smoking status: Never   • Smokeless tobacco: Never   Vaping Use   • Vaping Use: Never used   Substance and Sexual Activity   • Alcohol use: Yes     Comment: Less than 2 a month   • Drug use: Never   • Sexual activity: Yes      "Partners: Female       Family History   Problem Relation Age of Onset   • Cancer Mother    • Heart disease Mother    • Other Father    • No Known Problems Sister    • No Known Problems Sister    • No Known Problems Sister    • No Known Problems Sister    • Cancer Sister    • Other Sister    • Other Sister    • Cancer Brother         throat       REVIEW OF SYSTEMS:   12 point ROS was performed and is negative except as outlined in HPI         Objective:   Temp:  [97.4 °F (36.3 °C)-98.3 °F (36.8 °C)] 98.3 °F (36.8 °C)  Heart Rate:  [71-90] 72  Resp:  [16-18] 16  BP: (111-164)/(63-87) 111/64  Body mass index is 27.41 kg/m².  Flowsheet Rows    Flowsheet Row First Filed Value   Admission Height 170.2 cm (67\") Documented at 01/10/2023 1702   Admission Weight 79.4 kg (175 lb) Documented at 01/10/2023 1702        Vitals:    01/11/23 0740   BP: 111/64   Pulse: 72   Resp: 16   Temp: 98.3 °F (36.8 °C)   SpO2: 98%     GEN: no distress, alert and oriented  HEENT: NACT, EOMI, moist mucous membranes  Lungs: CTAB, no wheezes, rales or rhonchi  CV: normal rate, regular rhythm, normal S1, S2, no murmurs, +2 radial pulses b/l, no carotid bruit  Abdomen: soft, nontender, nondistended, NABS  Extremities: no edema  Skin: no rash, warm, dry  Heme/Lymph: no bruising  Psych: organized thought, normal behavior and affect            Lab Review:      Results from last 7 days   Lab Units 01/11/23  0519 01/10/23  1718   SODIUM mmol/L 141 139   POTASSIUM mmol/L 4.0 4.5   CHLORIDE mmol/L 108* 103   CO2 mmol/L 25.9 24.5   BUN mg/dL 18 25*   CREATININE mg/dL 0.84 1.22   CALCIUM mg/dL 8.8 9.0   BILIRUBIN mg/dL  --  0.2   ALK PHOS U/L  --  75   ALT (SGPT) U/L  --  12   AST (SGOT) U/L  --  15   GLUCOSE mg/dL 148* 227*     Results from last 7 days   Lab Units 01/11/23  0519 01/10/23  2330 01/10/23  1913   TROPONIN T ng/mL 0.016 0.021 0.025     Results from last 7 days   Lab Units 01/11/23  0519 01/10/23  1718   WBC 10*3/mm3 5.86 5.83   HEMOGLOBIN g/dL " 12.5* 13.0   HEMATOCRIT % 37.2* 39.5   PLATELETS 10*3/mm3 276 291     Results from last 7 days   Lab Units 01/10/23  1718   INR  0.89*           I personally viewed and interpreted the patient's EKG/Telemetry data)  Patient Active Problem List   Diagnosis   • Type 2 diabetes mellitus without complication, with long-term current use of insulin (HCC)   • Mixed hyperlipidemia   • Gastroesophageal reflux disease   • Acute left-sided low back pain with left-sided sciatica   • Routine adult health maintenance   • Artificial lens present   • Onychomycosis   • Hypertension   • Chest pain     Assessment and Plan:  #Unstable angina  #Hypertension  #Hyperlipidemia  #Diabetes    62-year-old man with hypertension, hyperlipidemia, diabetes (A1c 8) who presented with progressively worsening exertional chest pain, found to have normal troponin and nonspecific inferolateral ST-T changes on EKG, consistent with unstable angina.    - Status post aspirin 325 mg x 1 in ED, continue 81 mg daily  - Continue atorvastatin 40 mg daily  - Keep n.p.o. for left heart cath today  - Continue home lisinopril 10 mg daily    Devon Cristobal MD  01/11/23  09:21 EST.

## 2023-01-11 NOTE — NURSING NOTE
Patient transferred to cath lab at this time with all his belongings, stable at the time of transfer. Care transferred over to cardiology

## 2023-01-11 NOTE — PLAN OF CARE
Goal Outcome Evaluation:   Pt came in Rt sided anterior chest pain that been radiating to bilateral upper extremities for 3 months. Per pt pain has gotten worse lasting 5-10 minutes with activity and rest. No events overnight. No c/o chest pain. Dizziness or soa. Pt has been NPO since midnight. Pt has a nitro paste on left anterior upper chest. Third troponin pending. Pt has glucose sensor on left upper arm. Explained to pt that we still need to check his blood sugar due to those sensors being inaccurate at times. Pt not happy about having to have his finger stuck. Consult for cardiology to see pt this morning. Pt resting comfortably. Will continue to monitor.

## 2023-01-11 NOTE — PLAN OF CARE
Goal Outcome Evaluation:  Plan of Care Reviewed With: patient        Progress: improving       Pt post status heart cath, with 1 stent placed to the RCA. All VSS. Remain in NSR. Pt c/o pain in bilateral arms. See MAR. Right Radial site clean, dry, with TR band in placed and checked. WCTM. Plan to d/c tomorrow.

## 2023-01-11 NOTE — H&P
.   [facility]   HISTORY AND PHYSICAL    Patient Name: Sadiq Aldana  : 1960  MRN: [id number]  Primary Care Physician:  Ofelia Hernandez APRN  Date of admission: 1/10/2023    Subjective   Subjective     Chief Complaint: Chest pain    HPI:    Sadiq Aldana is a 62 y.o. male with past medical history including but not limited to hypertension, hyperlipidemia, and diabetes presents to [facility] with right sided anterior chest pain for the past 3 months that has progressively worsened over the past 2 weeks in severity.  Patient reports that he has been having intermittent right-sided chest pain that he describes as dull nonradiating and nonexertional pain.  Reports the pain has progressively worsened in severity and radiates into his shoulders down his arms.  Pain has an exertional component to the patient reports that walking instigate and worsen his pain.  Reports chest discomfort at rest as well.  Denies any associated nausea, vomiting, diaphoresis, back pain, or shortness of breath.  Denies history of coronary artery disease and has had a negative stress test in the past.  Currently patient rates his pain 2 on a scale 0-10.  Denies headache, lightheadedness or dizziness.  Denies abdominal pain, nausea, vomiting, diarrhea.  Denies cough, chills, fever, or lower extremity edema.  Patient reports that he had COVID a month ago and has been recovering as expected.  Denies dyspnea, orthopnea, or PND.    Initial troponin 0.034 and repeat 0.025, creatinine 4.5, D-dimer 0.75, INR 0.89, WBC 5.8, hemoglobin 13, and platelets 291.  Chest x-ray and CTA negative for acute infiltration or PE.  EKG shows sinus rhythm without evidence of acute ischemic changes.    Review of Systems   All systems were reviewed and negative except for: The mentioned above in HPI    Personal History     Past Medical History:   Diagnosis Date   • Cholelithiasis     Removed   • Depression     Cwife   • Diabetes mellitus  ([facility])    • Erectile dysfunction    • GERD (gastroesophageal reflux disease)    • Hyperlipidemia    • Hypertension    • Obstructive sleep apnea syndrome    • Peptic ulceration        Past Surgical History:   Procedure Laterality Date   • CHOLECYSTECTOMY     • FRACTURE SURGERY     • SHOULDER ARTHROSCOPY Bilateral     removal of bone spurs       Family History: family history includes Cancer in his brother, mother, and sister; Heart disease in his mother; No Known Problems in his sister, sister, sister, and sister; Other in his father, sister, and sister. Otherwise pertinent FHx was reviewed and not pertinent to current issue.    Social History:  reports that he has never smoked. He has never used smokeless tobacco. He reports current alcohol use. He reports that he does not use drugs.    Home Medications:  Dulaglutide, FreeStyle Eulalio 14 Day Sensor, aspirin, atorvastatin, benzonatate, cholecalciferol, ciclopirox, citalopram, doxycycline, empagliflozin, insulin degludec, lisinopril, metFORMIN, naproxen sodium, pantoprazole, and sildenafil    Allergies:  No Known Allergies    Objective   Objective     Vitals:   Temp:  [97.4 °F (36.3 °C)-98.2 °F (36.8 °C)] 98.2 °F (36.8 °C)  Heart Rate:  [74-90] 76  Resp:  [16] 16  BP: (133-164)/(74-87) 144/74  Physical Exam    Constitutional: Awake, alert, in no acute distress   Eyes: PERRLA, sclerae anicteric, no conjunctival injection   HENT: NCAT, mucous membranes moist   Neck: Supple, no thyromegaly, no lymphadenopathy, trachea midline   Respiratory: Clear to auscultation bilaterally, nonlabored respirations    Cardiovascular: RRR, no murmurs, rubs, or gallops, palpable pedal pulses bilaterally   Gastrointestinal: Positive bowel sounds, soft, nontender, nondistended   Musculoskeletal: No bilateral ankle edema, no clubbing or cyanosis to extremities   Psychiatric: Appropriate affect, cooperative   Neurologic: Oriented x 3, strength symmetric in all extremities, Cranial Nerves grossly  intact to confrontation, speech clear   Skin: No rashes     Result Review    Result Review:  I have personally reviewed the results from the time of this admission to 1/10/2023 21:26 EST and agree with these findings:  []  Laboratory list / accordion  []  Microbiology  []  Radiology  []  EKG/Telemetry   []  Cardiology/Vascular   []  Pathology  []  Old records  []  Other:  Most notable findings include:       Assessment & Plan   Assessment / Plan     Brief Patient Summary:  Sadiq Aldana is a 62 y.o. male who was seen and evaluated at the ED for right-sided anterior chest pain.  Patient is being admitted to the observation unit for further evaluation and cardiology consult.    Active Hospital Problems:  Active Hospital Problems    Diagnosis    • **Chest pain      Plan:   Chest pain  -Initial troponin 0.034 and repeat 0.025, trend  -EKG shows sinus rhythm with a rate of 78's and borderline T wave abnormality  -Cardiology consult  -D-dimer elevated and CTA negative for PE but it does demonstrates 1.2 cm nodular opacity within the right lower lobe and patient was made aware that he needs to follow-up with her PCP for further evaluation  -Chest x-ray negative for acute infiltrate  -Cardiac monitoring  -Nitroglycerin ointment every 6 hours  -N.p.o. after midnight for possible cardiac catheterization in a.m.    DM2  -Accu-Chek before meals and at bedtime  -Insulin sliding scale  -Hold metformin due to receiving IV contrast    Hypertension and hyperlipidemia  -Chronic conditions, continue home medications  -Vital signs per nursing  -Lipid panel pending    DVT prophylaxis:  Mechanical DVT prophylaxis orders are present.    CODE STATUS:    Level Of Support Discussed With: Patient  Code Status (Patient has no pulse and is not breathing): CPR (Attempt to Resuscitate)  Medical Interventions (Patient has pulse or is breathing): Full Support    Admission Status:  I believe this patient meets observation status.    I wore an face  mask, eye protection, and gloves during this patient encounter. Patient also wearing a surgical mask. Hand hygeine performed before and after seeing the patient.    Total of 17 minutes has been spent on this H&P including face-to-face encounter and reviewing labs and imaging      Dragon dictation used through out this note    Electronically signed by NEELA Van, 01/10/23, 9:26 PM EST.

## 2023-01-11 NOTE — ED NOTES
.Nursing report ED to floor  Sadiq Aldana  62 y.o.  male    HPI :   Chief Complaint   Patient presents with    Chest Pain    Arm Pain       Admitting doctor:   Seng Alexis MD    Admitting diagnosis:   The primary encounter diagnosis was Chest pain, unspecified type. Diagnoses of Nodule of right lung, Type 2 diabetes mellitus without complication, with long-term current use of insulin (HCC), Mixed hyperlipidemia, and Primary hypertension were also pertinent to this visit.    Code status:   Current Code Status       Date Active Code Status Order ID Comments User Context       1/10/2023 2110 CPR (Attempt to Resuscitate) [id number]  Ricardo Back APRN ED        Question Answer    Code Status (Patient has no pulse and is not breathing) CPR (Attempt to Resuscitate)    Medical Interventions (Patient has pulse or is breathing) Full Support    Level Of Support Discussed With Patient                    Allergies:   Patient has no known allergies.    Isolation:   No active isolations    Intake and Output  No intake or output data in the 24 hours ending 01/10/23 2126    Weight:       01/10/23  1702   Weight: 79.4 kg (175 lb)       Most recent vitals:   Vitals:    01/10/23 1932 01/10/23 2031 01/10/23 2100 01/10/23 2125   BP: 133/82 146/79 144/74    Pulse: 78 74 76    Resp:   16    Temp:    98.2 °F (36.8 °C)   TempSrc:    Oral   SpO2: 97% 99% 97%    Weight:       Height:           Active LDAs/IV Access:   Lines, Drains & Airways       Active LDAs       Name Placement date Placement time Site Days    Peripheral IV 01/10/23 1811 Right Antecubital 01/10/23  1811  Antecubital  less than 1                    Labs (abnormal labs have a star):   Labs Reviewed   COMPREHENSIVE METABOLIC PANEL - Abnormal; Notable for the following components:       Result Value    Glucose 227 (*)     BUN 25 (*)     All other components within normal limits    Narrative:     GFR Normal >60  Chronic Kidney Disease <60  Kidney Failure <15    "  TROPONIN (IN-HOUSE) - Abnormal; Notable for the following components:    Troponin T 0.034 (*)     All other components within normal limits    Narrative:     Troponin T Reference Range:  <= 0.03 ng/mL-   Negative for AMI  >0.03 ng/mL-     Abnormal for myocardial necrosis.  Clinicians would have to utilize clinical acumen, EKG, Troponin and serial changes to determine if it is an Acute Myocardial Infarction or myocardial injury due to an underlying chronic condition.       Results may be falsely decreased if patient taking Biotin.     PROTIME-INR - Abnormal; Notable for the following components:    INR 0.89 (*)     All other components within normal limits   D-DIMER, QUANTITATIVE - Abnormal; Notable for the following components:    D-Dimer, Quantitative 0.75 (*)     All other components within normal limits    Narrative:     According to the assay 's published package insert, a normal (<0.50 MCGFEU/mL) D-dimer result in conjunction with a non-high clinical probability assessment, excludes deep vein thrombosis (DVT) and pulmonary embolism (PE) with high sensitivity.    D-dimer values increase with age and this can make VTE exclusion of an older population difficult. To address this, the American College of [employer], based on best available evidence and recent guidelines, recommends that clinicians use age-adjusted D-dimer thresholds in patients greater than 50 years of age with: a) a low probability of PE who do not meet all Pulmonary Embolism Rule Out Criteria, or b) in those with intermediate probability of PE.   The formula for an age-adjusted D-dimer cut-off is \"age/100\".  For example, a 60 year old patient would have an age-adjusted cut-off of 0.60 MCGFEU/mL and an 80 year old 0.80 MCGFEU/mL.   BNP (IN-HOUSE) - Abnormal; Notable for the following components:    proBNP 959.0 (*)     All other components within normal limits    Narrative:     Among patients with dyspnea, NT-proBNP is highly " sensitive for the detection of acute congestive heart failure. In addition NT-proBNP of <300 pg/ml effectively rules out acute congestive heart failure with 99% negative predictive value.    Results may be falsely decreased if patient taking Biotin.     TROPONIN (IN-HOUSE) - Normal    Narrative:     Troponin T Reference Range:  <= 0.03 ng/mL-   Negative for AMI  >0.03 ng/mL-     Abnormal for myocardial necrosis.  Clinicians would have to utilize clinical acumen, EKG, Troponin and serial changes to determine if it is an Acute Myocardial Infarction or myocardial injury due to an underlying chronic condition.       Results may be falsely decreased if patient taking Biotin.     CBC WITH AUTO DIFFERENTIAL - Normal   RAINBOW DRAW    Narrative:     The following orders were created for panel order [place] Draw.  Procedure                               Abnormality         Status                     ---------                               -----------         ------                     Green Top (Gel)[[id number]]                                  Final result               Lavender Top[[id number]]                                     Final result               Gold Top - SST[[id number]]                                   Final result               Light Blue Top[[id number]]                                   Final result                 Please view results for these tests on the individual orders.   LIPID PANEL   TROPONIN (IN-HOUSE)   CBC AND DIFFERENTIAL    Narrative:     The following orders were created for panel order CBC & Differential.  Procedure                               Abnormality         Status                     ---------                               -----------         ------                     CBC Auto Differential[[id number]]        Normal              Final result                 Please view results for these tests on the individual orders.   GREEN TOP   LAVENDER TOP   GOLD TOP - SST   LIGHT BLUE TOP       EKG:    ECG 12 Lead Chest Pain   Preliminary Result   HEART RATE= 78  bpm   RR Interval= 769  ms   AZ Interval= 156  ms   P Horizontal Axis= 7  deg   P Front Axis= 59  deg   QRSD Interval= 100  ms   QT Interval= 377  ms   QRS Axis= 8  deg   T Wave Axis= 34  deg   - BORDERLINE ECG -   Sinus rhythm   Borderline T wave abnormalities   Electronically Signed By:    Date and Time of Study: 2023-01-10 16:50:23          Meds given in ED:   Medications   sodium chloride 0.9 % flush 10 mL (has no administration in time range)   sodium chloride 0.9 % flush 10 mL (has no administration in time range)   aspirin tablet 325 mg (325 mg Oral Given 1/10/23 1805)   iopamidol (ISOVUE-370) 76 % injection 100 mL (95 mL Intravenous Given 1/10/23 2011)       Imaging results:  XR Chest 2 View    Result Date: 1/10/2023  No evidence for active disease in the chest.  This report was finalized on 1/10/2023 5:58 PM by Dr. Xander Gonsales M.D.      CT Angiogram Chest    Result Date: 1/10/2023  1. 1.2 cm nodular opacity within the right lower lobe just above the dome of the diaphragm. There is also mild right infrahilar barbara enlargement. Attempt at characterization could be performed with CT. 2. No evidence for pulmonary thromboembolic disease. 3. Previous cholecystectomy.  Discussed with Dr. Barros in the emergency department on 01/10/2023 at 8:30 PM.  This report was finalized on 1/10/2023 8:47 PM by Dr. Xander Gonsales M.D.       Ambulatory status:   - Up ad keysha     Social issues:   Social History     Socioeconomic History    Marital status:    Tobacco Use    Smoking status: Never    Smokeless tobacco: Never   Vaping Use    Vaping Use: Never used   Substance and Sexual Activity    Alcohol use: Yes     Comment: Less than 2 a month    Drug use: Never    Sexual activity: Yes     Partners: Female       NIH Stroke Scale:         Dyan Parisi RN  01/10/23 21:26 EST

## 2023-01-11 NOTE — PROGRESS NOTES
ED OBSERVATION PROGRESS/DISCHARGE SUMMARY    Date of Admission: 1/10/2023   LOS: 0 days   PCP: Ofelia Hernandez APRN    Subjective  Patient is resting comfortably in no acute distress, pain and dyspnea are associated with exertion and at rest patient states he feels well.    Hospital Outcome: 62-year-old male admitted to the observation unit for further evaluation of chest pain.  Patient's initial troponin at time of arrival was 0.034.  This has trended down to 0.016.  Patient was seen by cardiology this morning and he will go to Cath Lab today.      ROS:  General: no fevers, chills  Respiratory: no cough, dyspnea  Cardiovascular: no chest pain, palpitations  Abdomen: No abdominal pain, nausea, vomiting, or diarrhea  Neurologic: No focal weakness    Objective   Physical Exam:  I have reviewed the vital signs.  Temp:  [97.4 °F (36.3 °C)-98.3 °F (36.8 °C)] 98.3 °F (36.8 °C)  Heart Rate:  [71-90] 72  Resp:  [16-18] 16  BP: (111-164)/(63-87) 111/64  General Appearance:    Alert, cooperative, no distress  Head:    Normocephalic, atraumatic  Eyes:    Sclerae anicteric  Neck:   Supple, no mass  Lungs: Clear to auscultation bilaterally, respirations unlabored  Heart: Regular rate and rhythm, S1 and S2 normal, no murmur, rub or gallop  Abdomen:  Soft, non-tender, bowel sounds active, nondistended  Extremities: No clubbing, cyanosis, or edema to lower extremities  Pulses:  2+ and symmetric in distal lower extremities  Skin: No rashes   Neurologic: Oriented x3, Normal strength to extremities    Results Review:    I have reviewed the labs, radiology results and diagnostic studies.    Results from last 7 days   Lab Units 01/11/23  0519   WBC 10*3/mm3 5.86   HEMOGLOBIN g/dL 12.5*   HEMATOCRIT % 37.2*   PLATELETS 10*3/mm3 276     Results from last 7 days   Lab Units 01/11/23  0519 01/10/23  1718   SODIUM mmol/L 141 139   POTASSIUM mmol/L 4.0 4.5   CHLORIDE mmol/L 108* 103   CO2 mmol/L 25.9 24.5   BUN mg/dL 18 25*   CREATININE  mg/dL 0.84 1.22   CALCIUM mg/dL 8.8 9.0   BILIRUBIN mg/dL  --  0.2   ALK PHOS U/L  --  75   ALT (SGPT) U/L  --  12   AST (SGOT) U/L  --  15   GLUCOSE mg/dL 148* 227*     Imaging Results (Last 24 Hours)     Procedure Component Value Units Date/Time    CT Angiogram Chest [[id number]] Collected: 01/10/23 2043     Updated: 01/10/23 2050    Narrative:      CT ANGIOGRAM OF THE CHEST. MULTIPLE CORONAL, SAGITTAL, AND 3-D  RECONSTRUCTIONS.     HISTORY: Shortness of breath     TECHNIQUE: Radiation dose reduction techniques were utilized, including  automated exposure control and exposure modulation based on body size.   CT angiogram of the chest was performed following the administration of  IV contrast. Coronal, sagittal, and 3-D reconstruction images were  obtained.     COMPARISON:  Two-view chest 01/10/2023     FINDINGS: There is no intrapulmonary arterial filling defect to diagnose  a pulmonary embolus. Thoracic aorta exhibits normal size.     Within the anterior right lower lobe there is a nodule that measures 1.1  x 0.9 cm axial dimension and extends 1.2 cm in oblique height when  measured in the sagittal plane. Nodule is just above the posterior  margin of the dominant of the diaphragm. There is also a mildly enlarged  right infrahilar lymph node that measures approximately 1.4 x 1 cm.  There is no further evidence for mediastinal barbara enlargement and no  left hilar barbara enlargement is demonstrated. No endotracheal or central  intrabronchial lesion is demonstrated.     There is elevation of the right hemidiaphragm. There has been previous  cholecystectomy. Within the lateral right interpolar kidney there is a  low-density lesion that is most likely a cyst and measures 1.5 cm.       Impression:      1. 1.2 cm nodular opacity within the right lower lobe just above the  dome of the diaphragm. There is also mild right infrahilar barbara  enlargement. Attempt at characterization could be performed with CT.  2. No evidence  for pulmonary thromboembolic disease.  3. Previous cholecystectomy.     Discussed with Dr. Barros in the emergency department on 01/10/2023 at  8:30 PM.     This report was finalized on 1/10/2023 8:47 PM by Dr. Xander Gonsales M.D.       XR Chest 2 View [[id number]] Collected: 01/10/23 1735     Updated: 01/10/23 1801    Narrative:      CHEST: 2 VIEWS     HISTORY: Chest pain     COMPARISON: None     FINDINGS:Cardiomediastinal silhouette is normal. Lungs are clear and  there is no evidence for pulmonary edema or pleural effusion. There is  multilevel endplate spur formation within the thoracic spine.  Cholecystectomy clips are present.       Impression:      No evidence for active disease in the chest.     This report was finalized on 1/10/2023 5:58 PM by Dr. Xander Gonsales M.D.             I have reviewed the medications.  ---------------------------------------------------------------------------------------------  Assessment & Plan   Assessment/Problem List    Chest pain      Plan:    Chest pain   -Initial troponin 0.034 and repeat 0.025, trend  -EKG shows sinus rhythm with a rate of 78's and borderline T wave abnormality  -Cardiology consult  -D-dimer elevated and CTA negative for PE but it does demonstrates 1.2 cm nodular opacity within the right lower lobe and patient was made aware that he needs to follow-up with her PCP for further evaluation  -Chest x-ray negative for acute infiltrate  -Cardiac monitoring  -Nitroglycerin ointment every 6 hours  -N.p.o.   -Cardiac catheterization today     DM2  -Accu-Chek before meals and at bedtime  -Insulin sliding scale  -Hold metformin due to receiving IV contrast     Hypertension and hyperlipidemia  -Chronic conditions, continue home medications  -Vital signs per nursing  -Lipid panel pending    Disposition: To Cath Lab    50 minutes has been spent by [facility] providers in the care of this patient while under observation status      This note will serve as transfer summary    ERICK Doshi 01/11/23 08:24 EST

## 2023-01-12 ENCOUNTER — READMISSION MANAGEMENT (OUTPATIENT)
Dept: CALL CENTER | Facility: HOSPITAL | Age: 63
End: 2023-01-12
Payer: COMMERCIAL

## 2023-01-12 VITALS
TEMPERATURE: 97.9 F | SYSTOLIC BLOOD PRESSURE: 150 MMHG | OXYGEN SATURATION: 95 % | BODY MASS INDEX: 27.47 KG/M2 | WEIGHT: 175 LBS | HEIGHT: 67 IN | HEART RATE: 85 BPM | RESPIRATION RATE: 16 BRPM | DIASTOLIC BLOOD PRESSURE: 72 MMHG

## 2023-01-12 PROBLEM — R07.9 CHEST PAIN, UNSPECIFIED TYPE: Status: ACTIVE | Noted: 2023-01-12

## 2023-01-12 LAB
ANION GAP SERPL CALCULATED.3IONS-SCNC: 5.5 MMOL/L (ref 5–15)
BUN SERPL-MCNC: 23 MG/DL (ref 8–23)
BUN/CREAT SERPL: 25.6 (ref 7–25)
CALCIUM SPEC-SCNC: 8.5 MG/DL (ref 8.6–10.5)
CHLORIDE SERPL-SCNC: 105 MMOL/L (ref 98–107)
CHOLEST SERPL-MCNC: 131 MG/DL (ref 0–200)
CO2 SERPL-SCNC: 25.5 MMOL/L (ref 22–29)
CREAT SERPL-MCNC: 0.9 MG/DL (ref 0.76–1.27)
DEPRECATED RDW RBC AUTO: 40.1 FL (ref 37–54)
EGFRCR SERPLBLD CKD-EPI 2021: 96.6 ML/MIN/1.73
ERYTHROCYTE [DISTWIDTH] IN BLOOD BY AUTOMATED COUNT: 12.6 % (ref 12.3–15.4)
GLUCOSE BLDC GLUCOMTR-MCNC: 104 MG/DL (ref 70–130)
GLUCOSE BLDC GLUCOMTR-MCNC: 201 MG/DL (ref 70–130)
GLUCOSE BLDC GLUCOMTR-MCNC: 209 MG/DL (ref 70–130)
GLUCOSE SERPL-MCNC: 118 MG/DL (ref 65–99)
HBA1C MFR BLD: 8 % (ref 4.8–5.6)
HCT VFR BLD AUTO: 33.9 % (ref 37.5–51)
HDLC SERPL-MCNC: 42 MG/DL (ref 40–60)
HGB BLD-MCNC: 11.8 G/DL (ref 13–17.7)
LDLC SERPL CALC-MCNC: 56 MG/DL (ref 0–100)
LDLC/HDLC SERPL: 1.15 {RATIO}
MCH RBC QN AUTO: 30.3 PG (ref 26.6–33)
MCHC RBC AUTO-ENTMCNC: 34.8 G/DL (ref 31.5–35.7)
MCV RBC AUTO: 87.1 FL (ref 79–97)
PLATELET # BLD AUTO: 250 10*3/MM3 (ref 140–450)
PMV BLD AUTO: 8.8 FL (ref 6–12)
POTASSIUM SERPL-SCNC: 3.9 MMOL/L (ref 3.5–5.2)
QT INTERVAL: 407 MS
RBC # BLD AUTO: 3.89 10*6/MM3 (ref 4.14–5.8)
SODIUM SERPL-SCNC: 136 MMOL/L (ref 136–145)
TRIGL SERPL-MCNC: 204 MG/DL (ref 0–150)
VLDLC SERPL-MCNC: 33 MG/DL (ref 5–40)
WBC NRBC COR # BLD: 5.62 10*3/MM3 (ref 3.4–10.8)

## 2023-01-12 PROCEDURE — 82962 GLUCOSE BLOOD TEST: CPT

## 2023-01-12 PROCEDURE — 93005 ELECTROCARDIOGRAM TRACING: CPT | Performed by: INTERNAL MEDICINE

## 2023-01-12 PROCEDURE — 85027 COMPLETE CBC AUTOMATED: CPT | Performed by: INTERNAL MEDICINE

## 2023-01-12 PROCEDURE — 93010 ELECTROCARDIOGRAM REPORT: CPT | Performed by: INTERNAL MEDICINE

## 2023-01-12 PROCEDURE — 83036 HEMOGLOBIN GLYCOSYLATED A1C: CPT | Performed by: INTERNAL MEDICINE

## 2023-01-12 PROCEDURE — 99239 HOSP IP/OBS DSCHRG MGMT >30: CPT | Performed by: STUDENT IN AN ORGANIZED HEALTH CARE EDUCATION/TRAINING PROGRAM

## 2023-01-12 PROCEDURE — 80061 LIPID PANEL: CPT | Performed by: INTERNAL MEDICINE

## 2023-01-12 PROCEDURE — 63710000001 INSULIN LISPRO (HUMAN) PER 5 UNITS: Performed by: INTERNAL MEDICINE

## 2023-01-12 PROCEDURE — 80048 BASIC METABOLIC PNL TOTAL CA: CPT | Performed by: INTERNAL MEDICINE

## 2023-01-12 RX ORDER — PRASUGREL 10 MG/1
10 TABLET, FILM COATED ORAL DAILY
Qty: 30 TABLET | Refills: 11 | Status: SHIPPED | OUTPATIENT
Start: 2023-01-13

## 2023-01-12 RX ADMIN — LISINOPRIL 10 MG: 10 TABLET ORAL at 12:13

## 2023-01-12 RX ADMIN — METOPROLOL TARTRATE 12.5 MG: 25 TABLET, FILM COATED ORAL at 12:13

## 2023-01-12 RX ADMIN — PRASUGREL 10 MG: 10 TABLET, FILM COATED ORAL at 08:11

## 2023-01-12 RX ADMIN — ASPIRIN 81 MG: 81 TABLET, COATED ORAL at 08:11

## 2023-01-12 RX ADMIN — ATORVASTATIN CALCIUM 40 MG: 20 TABLET, FILM COATED ORAL at 08:11

## 2023-01-12 RX ADMIN — INSULIN LISPRO 3 UNITS: 100 INJECTION, SOLUTION INTRAVENOUS; SUBCUTANEOUS at 12:16

## 2023-01-12 RX ADMIN — HYDROCODONE BITARTRATE AND ACETAMINOPHEN 1 TABLET: 5; 325 TABLET ORAL at 08:11

## 2023-01-12 RX ADMIN — PANTOPRAZOLE SODIUM 40 MG: 40 TABLET, DELAYED RELEASE ORAL at 08:11

## 2023-01-12 NOTE — PROGRESS NOTES
[facility] Clinical Pharmacy Services: National Cardiology Data Registry (NCDR) Medication Review    Sadiq Aldana is s/p PCI with drug-eluting stent placement for unstable angina . Pharmacy to review discharge medications to make sure appropriate medications have been prescribed.    Patient has been discharged on the following:  Aspirin: 81 mg once a day  High Intensity Statin: atorvastatin 40 mg once a day  Beta-blocker: metoprolol tartrate 12.5 mg twice a day  P2Y12 Inhibitor: prsugrel 10 mg once a day  LVEF <40: no    These medications meet the requirements for NCDR discharge medication for chest pain and MI.    Brandt Joseph, [facility]  Clinical Pharmacist

## 2023-01-12 NOTE — PLAN OF CARE
Goal Outcome Evaluation:  Plan of Care Reviewed With: patient        Progress: improving  Outcome Evaluation: Pt admitted with chest pain, underwent cardiac catheterization with stent placement. Completed post procedure vitals & fluid infusion, right radial site to pressure dressing. Ambulated to bathroom with minimal assist, vss, NSR on monitor, denies chest pain. Will continue to monitor

## 2023-01-12 NOTE — CONSULTS
Met with patient, discussed benefits of cardiac rehab. Provided phase II information along with the contact information for cardiac rehab here at . Patient undecided if he will attend, He will call if interested.

## 2023-01-12 NOTE — OUTREACH NOTE
Prep Survey    Flowsheet Row Responses   [facility] patient discharged from? [place]   Is LACE score < 7 ? Yes   Eligibility [facility]   Date of Admission 01/10/23   Date of Discharge 01/12/23   Discharge Disposition Home or Self Care   Discharge diagnosis Chest pain    Does the patient have one of the following disease processes/diagnoses(primary or secondary)? Other   Does the patient have Home health ordered? No   Is there a DME ordered? No   Prep survey completed? Yes          REGAN RICHARDSON - Registered Nurse

## 2023-01-13 ENCOUNTER — TRANSITIONAL CARE MANAGEMENT TELEPHONE ENCOUNTER (OUTPATIENT)
Dept: CALL CENTER | Facility: HOSPITAL | Age: 63
End: 2023-01-13
Payer: COMMERCIAL

## 2023-01-13 NOTE — OUTREACH NOTE
Call Center TCM Note    Flowsheet Row Responses   [facility] patient discharged from? [place]   Does the patient have one of the following disease processes/diagnoses(primary or secondary)? Other   TCM attempt successful? Yes   Call start time 1550   Call end time 1552   Discharge diagnosis Chest pain    Meds reviewed with patient/caregiver? Yes   Is the patient having any side effects they believe may be caused by any medication additions or changes? No   Does the patient have all medications ordered at discharge? Yes   Is the patient taking all medications as directed (includes completed medication regime)? Yes   Comments Patient has 30 min PCP appt on 1/20/23@1100   Does the patient have an appointment with their PCP within 7 days of discharge? Yes   Has home health visited the patient within 72 hours of discharge? N/A   Psychosocial issues? No   Did the patient receive a copy of their discharge instructions? Yes   Nursing interventions Reviewed instructions with patient   What is the patient's perception of their health status since discharge? Improving   Is the patient/caregiver able to teach back signs and symptoms related to disease process for when to call PCP? Yes   Is the patient/caregiver able to teach back signs and symptoms related to disease process for when to call 911? Yes   Is the patient/caregiver able to teach back the hierarchy of who to call/visit for symptoms/problems? PCP, Specialist, Home health nurse, Urgent Care, ED, 911 Yes   If the patient is a current smoker, are they able to teach back resources for cessation? Not a smoker   TCM call completed? Yes   Call end time 1552   Would this patient benefit from a Referral to Amb Social Work? No   Is the patient interested in additional calls from an ambulatory ?  NOTE:  applies to high risk patients requiring additional follow-up. No          Karen Carr RN    1/13/2023, 15:53 EST

## 2023-01-13 NOTE — OUTREACH NOTE
Call Center TCM Note    Flowsheet Row Responses   [facility] [place] patient discharged from? [place]   Does the patient have one of the following disease processes/diagnoses(primary or secondary)? Other   TCM attempt successful? No  [verbal release on file. Spouse]   Unsuccessful attempts Attempt 1          Karen Carr RN    1/13/2023, 13:42 EST

## 2023-01-13 NOTE — DISCHARGE SUMMARY
[facility] Discharge Summary      Patient Name: Sadiq Aldana  Age/Sex: 62 y.o. male  : 1960  MRN: [id number]    Encounter Provider: Devon Parker MD  Referring Provider: No ref. provider found  Place of Service: [facility] CARDIOLOGY  Patient Care Team:  Ofelia Hernandez APRN as PCP - General (Family Medicine)         Date of Discharge:  2023     Date of Admit: 1/10/2023      Discharge Diagnosis:   Chest pain    Unstable angina (HCC)    Chest pain, unspecified type        Hospital Course:   62-year-old man with hypertension, hyperlipidemia, diabetes (A1c 8) who presented with progressively worsening exertional chest pain, found to have normal troponin and nonspecific inferolateral ST-T changes on EKG, consistent with unstable angina.  Status post left heart cath 2023 with PCI with 3.0 x 28mm Xience Skypoint drug-eluting stent (postdilated with a 3.5 mm NC) to distal RCA/ostial PL. He was started on effient and metoprolol and discharged home in stable condition.    Vitals:    23 1213   BP: 150/72   Pulse: 85   Resp:    Temp:    SpO2: 95%     Physical Exam:  GEN: no distress, alert and oriented  HEENT: NACT, EOMI, moist mucous membranes  Lungs: CTAB, no wheezes, rales or rhonchi  CV: normal rate, regular rhythm, normal S1, S1, no murmurs, RRA access site cdi, no hematoma, pulse 2_+  Abdomen: soft, nontender, nondistended, NABS  Extremities: no edema  Skin: no rash, warm, dry  Heme/Lymph: no bruising  Psych: organized thought, normal behavior and affect     Procedures Performed:  Procedure(s):  Left Heart Cath  Coronary angiography  Left ventriculography  Percutaneous Coronary Intervention  Stent EUGENIO coronary         Consults:  Consults     Date and Time Order Name Status Description    1/10/2023  9:10 PM Inpatient Cardiology Consult Completed           Pertinent Test Results:    Results from last 7 days   Lab Units 23  0336 23  0519  01/10/23  1718   SODIUM mmol/L 136 141 139   POTASSIUM mmol/L 3.9 4.0 4.5   CHLORIDE mmol/L 105 108* 103   CO2 mmol/L 25.5 25.9 24.5   BUN mg/dL 23 18 25*   CREATININE mg/dL 0.90 0.84 1.22   GLUCOSE mg/dL 118* 148* 227*   CALCIUM mg/dL 8.5* 8.8 9.0       Results from last 7 days   Lab Units 01/11/23  0519 01/10/23  2330 01/10/23  1913 01/10/23  1718   TROPONIN T ng/mL 0.016 0.021 0.025 0.034*     Results from last 7 days   Lab Units 01/12/23  0336 01/11/23  0519 01/10/23  1718   WBC 10*3/mm3 5.62 5.86 5.83   HEMOGLOBIN g/dL 11.8* 12.5* 13.0   HEMATOCRIT % 33.9* 37.2* 39.5   PLATELETS 10*3/mm3 250 276 291     Results from last 7 days   Lab Units 01/10/23  1718   INR  0.89*         Results from last 7 days   Lab Units 01/12/23  0336 01/10/23  1913   CHOLESTEROL mg/dL 131 133   TRIGLYCERIDES mg/dL 204* 110   HDL CHOL mg/dL 42 46     Results from last 7 days   Lab Units 01/10/23  1718   PROBNP pg/mL 959.0*               Discharge Medications     Your medication list      START taking these medications      Instructions Last Dose Given Next Dose Due   metoprolol tartrate 25 MG tablet  Commonly known as: LOPRESSOR      Take 0.5 tablets by mouth Every 12 (Twelve) Hours.       prasugrel 10 MG tablet  Commonly known as: EFFIENT  Start taking on: January 13, 2023      Take 1 tablet by mouth Daily.          CONTINUE taking these medications      Instructions Last Dose Given Next Dose Due   aspirin 81 MG EC tablet      Take 1 tablet by mouth Daily.       atorvastatin 40 MG tablet  Commonly known as: Lipitor      Take 1 tablet by mouth Daily.       cholecalciferol 10 MCG (400 UNIT) tablet  Commonly known as: VITAMIN D3      Take 1 tablet by mouth Daily.       FreeStyle Eulalio 14 Day Sensor misc      1 Units 3 (Three) Times a Day.       Jardiance 25 MG tablet tablet  Generic drug: empagliflozin      TAKE ONE TABLET BY MOUTH DAILY       lisinopril 10 MG tablet  Commonly known as: PRINIVIL,ZESTRIL      Take 1 tablet by mouth  Daily.       metFORMIN 500 MG tablet  Commonly known as: GLUCOPHAGE      TAKE TWO TABLETS BY MOUTH TWICE A DAY WITH A MEAL       pantoprazole 40 MG EC tablet  Commonly known as: PROTONIX      Take 1 tablet by mouth 2 (Two) Times a Day.       sildenafil 25 MG tablet  Commonly known as: VIAGRA      TAKE TWO TABLETS BY MOUTH DAILY AS NEEDED FOR ERECTILE DYSFUNCTION       Tresiba FlexTouch 100 UNIT/ML solution pen-injector injection  Generic drug: insulin degludec      Inject 32 Units under the skin into the appropriate area as directed Every Night. Doing in am now       Trulicity 3 MG/0.5ML solution pen-injector  Generic drug: Dulaglutide      Inject 0.5 mL as directed 1 (One) Time Per Week.             Where to Get Your Medications      These medications were sent to [facility] [place] [id number]    Hours: 7:00 AM-6:00 PM Mon-Fri, 8:00 AM-4:30 PM Sat-Sun (Closed 12-12:30PM) Phone: [phone]   · metoprolol tartrate 25 MG tablet  · prasugrel 10 MG tablet           Discharge Diet:         Discharge disposition: Home    Discharge condition: Stable      Follow-up Appointments  Future Appointments   Date Time Provider Department Center   1/20/2023 11:00 AM Ofelia Hernandez APRN [facility]   1/20/2023  2:45 PM Devon Cristobal MD [facility]      Follow-up Information     [facility] CARD REH[facility] .    Specialty: Cardiac Rehabilitation  Contact information:  [address] [place] [id number] [phone]           Ofelia Hernandez APRN .    Specialties: Family Medicine, Nurse Practitioner  Contact information:  [address] [place] [id number] [phone]                           Test Results Pending at Discharge  Additional Instructions for the Follow-ups that You Need to Schedule     Ambulatory Referral to Cardiac Rehab   As directed               Time:   I spent  > 30  minutes on this discharge activity which included: face-to-face  encounter with the patient, reviewing the data in the system, coordination of the care with the nursing staff as well as consultants, documentation, and entering orders.        Devon Parker MD  [facility]  01/13/23  08:13 EST

## 2023-01-19 NOTE — TELEPHONE ENCOUNTER
"Right sided burning chest pain a few times a week for a month since flare up of some heart burn. No other symptoms.  Reason for Disposition  • [1] Chest pain lasting < 5 minutes AND [2] NO chest pain or cardiac symptoms (e.g., breathing difficulty, sweating) now (Exception: chest pains that last only a few seconds)    Additional Information  • Negative: SEVERE difficulty breathing (e.g., struggling for each breath, speaks in single words)  • Negative: Difficult to awaken or acting confused (e.g., disoriented, slurred speech)  • Negative: Shock suspected (e.g., cold/pale/clammy skin, too weak to stand, low BP, rapid pulse)  • Negative: Passed out (i.e., fainted, collapsed and was not responding)  • Negative: [1] Chest pain lasts > 5 minutes AND [2] age > 44  • Negative: [1] Chest pain lasts > 5 minutes AND [2] age > 30 AND [3] one or more cardiac risk factors (e.g., diabetes, high blood pressure, high cholesterol, smoker, or strong family history of heart disease)  • Negative: [1] Chest pain lasts > 5 minutes AND [2] history of heart disease (i.e., angina, heart attack, heart failure, bypass surgery, takes nitroglycerin)  • Negative: [1] Chest pain lasts > 5 minutes AND [2] described as crushing, pressure-like, or heavy  • Negative: Heart beating < 50 beats per minute OR > 140 beats per minute  • Negative: Visible sweat on face or sweat dripping down face  • Negative: Sounds like a life-threatening emergency to the triager  • Negative: Followed a chest injury  • Negative: SEVERE chest pain  • Negative: [1] Chest pain (or \"angina\") comes and goes AND [2] is happening more often (increasing in frequency) or getting worse (increasing in severity) (Exception: chest pains that last only a few seconds)  • Negative: Pain also in shoulder(s) or arm(s) or jaw (Exception: pain is clearly made worse by movement)  • Negative: Difficulty breathing  • Negative: Dizziness or lightheadedness  • Negative: Coughing up blood  • " "Negative: Cocaine use within last 3 days  • Negative: Major surgery in past month  • Negative: Hip or leg fracture (broken bone) in past month (or had cast on leg or ankle in past month)  • Negative: Illness requiring prolonged bedrest in past month (e.g., immobilization, long hospital stay)  • Negative: Long-distance travel in past month (e.g., car, bus, train, plane; with trip lasting 6 or more hours)  • Negative: History of prior \"blood clot\" in leg or lungs (i.e., deep vein thrombosis, pulmonary embolism)  • Negative: History of inherited increased risk of blood clots (e.g., Factor 5 Leiden, Anti-thrombin 3, Protein C or Protein S deficiency, Prothrombin mutation)  • Negative: Cancer treatment in past six months (or has cancer now)  • Negative: [1] Chest pain lasts > 5 minutes AND [2] occurred in past 3 days (72 hours) (Exception: feels exactly the same as previously diagnosed heartburn and has accompanying sour taste in mouth)  • Negative: Taking a deep breath makes pain worse  • Negative: Patient sounds very sick or weak to the triager  • Negative: [1] Chest pain lasts > 5 minutes AND [2] occurred > 3 days ago (72 hours) AND [3] NO chest pain or cardiac symptoms now    Answer Assessment - Initial Assessment Questions  1. LOCATION: \"Where does it hurt?\"        Right sided  2. RADIATION: \"Does the pain go anywhere else?\" (e.g., into neck, jaw, arms, back)      no  3. ONSET: \"When did the chest pain begin?\" (Minutes, hours or days)       About a month ago after acute heart burn episide   4. PATTERN \"Does the pain come and go, or has it been constant since it started?\"  \"Does it get worse with exertion?\"       Comes and goes  5. DURATION: \"How long does it last\" (e.g., seconds, minutes, hours)      Couple minutes  6. SEVERITY: \"How bad is the pain?\"  (e.g., Scale 1-10; mild, moderate, or severe)     - MILD (1-3): doesn't interfere with normal activities      - MODERATE (4-7): interferes with normal activities or " "awakens from sleep     - SEVERE (8-10): excruciating pain, unable to do any normal activities        mild  7. CARDIAC RISK FACTORS: \"Do you have any history of heart problems or risk factors for heart disease?\" (e.g., angina, prior heart attack; diabetes, high blood pressure, high cholesterol, smoker, or strong family history of heart disease)      hitstory or severe reflux in past  8. PULMONARY RISK FACTORS: \"Do you have any history of lung disease?\"  (e.g., blood clots in lung, asthma, emphysema, birth control pills)      no  9. CAUSE: \"What do you think is causing the chest pain?\"      Heart burn  10. OTHER SYMPTOMS: \"Do you have any other symptoms?\" (e.g., dizziness, nausea, vomiting, sweating, fever, difficulty breathing, cough)        ni  11. PREGNANCY: \"Is there any chance you are pregnant?\" \"When was your last menstrual period?\"        ni    Protocols used: CHEST PAIN-ADULT-AH    " Calm/Appropriate

## 2023-01-20 ENCOUNTER — OFFICE VISIT (OUTPATIENT)
Dept: FAMILY MEDICINE CLINIC | Facility: CLINIC | Age: 63
End: 2023-01-20
Payer: COMMERCIAL

## 2023-01-20 VITALS
BODY MASS INDEX: 29.41 KG/M2 | SYSTOLIC BLOOD PRESSURE: 120 MMHG | HEIGHT: 67 IN | TEMPERATURE: 96.4 F | RESPIRATION RATE: 18 BRPM | WEIGHT: 187.4 LBS | OXYGEN SATURATION: 99 % | HEART RATE: 73 BPM | DIASTOLIC BLOOD PRESSURE: 68 MMHG

## 2023-01-20 DIAGNOSIS — Z79.4 TYPE 2 DIABETES MELLITUS WITHOUT COMPLICATION, WITH LONG-TERM CURRENT USE OF INSULIN: ICD-10-CM

## 2023-01-20 DIAGNOSIS — E11.9 TYPE 2 DIABETES MELLITUS WITHOUT COMPLICATION, WITH LONG-TERM CURRENT USE OF INSULIN: ICD-10-CM

## 2023-01-20 DIAGNOSIS — E78.2 MIXED HYPERLIPIDEMIA: Primary | ICD-10-CM

## 2023-01-20 DIAGNOSIS — R91.1 PULMONARY NODULE: ICD-10-CM

## 2023-01-20 DIAGNOSIS — I10 PRIMARY HYPERTENSION: ICD-10-CM

## 2023-01-20 PROCEDURE — 99214 OFFICE O/P EST MOD 30 MIN: CPT | Performed by: NURSE PRACTITIONER

## 2023-01-20 RX ORDER — ORAL SEMAGLUTIDE 3 MG/1
3 TABLET ORAL DAILY
Qty: 30 TABLET | Refills: 0 | Status: SHIPPED | OUTPATIENT
Start: 2023-01-20 | End: 2023-02-17

## 2023-01-20 NOTE — PROGRESS NOTES
Subjective   Sadiq Aldana is a 62 y.o. male. Patient is here today for   Chief Complaint   Patient presents with   • Hyperlipidemia     3mo fu       (Not on file)-  Risk for Readmission (LACE) Score: 3 (1/12/2023  6:00 AM)           Vitals:    01/20/23 1122   BP: 120/68   Pulse:    Resp:    Temp:    SpO2:      The following portions of the patient's history were reviewed and updated as appropriate: allergies, current medications, past family history, past medical history, past social history, past surgical history and problem list.    Past Medical History:   Diagnosis Date   • Cholelithiasis     Removed   • Depression 6/22    Cwife   • Diabetes mellitus (HCC)    • Erectile dysfunction    • GERD (gastroesophageal reflux disease)    • Hyperlipidemia    • Hypertension    • Obstructive sleep apnea syndrome    • Peptic ulceration       No Known Allergies   Social History     Socioeconomic History   • Marital status:    Tobacco Use   • Smoking status: Never   • Smokeless tobacco: Never   Vaping Use   • Vaping Use: Never used   Substance and Sexual Activity   • Alcohol use: Yes     Comment: Less than 2 a month   • Drug use: Never   • Sexual activity: Yes     Partners: Female        Current Outpatient Medications:   •  aspirin 81 MG EC tablet, Take 1 tablet by mouth Daily., Disp: 90 tablet, Rfl: 4  •  atorvastatin (Lipitor) 40 MG tablet, Take 1 tablet by mouth Daily., Disp: 30 tablet, Rfl: 5  •  cholecalciferol (VITAMIN D3) 10 MCG (400 UNIT) tablet, Take 1 tablet by mouth Daily., Disp: 90 tablet, Rfl: 4  •  Continuous Blood Gluc Sensor (FreeStyle Eulalio 14 Day Sensor) misc, 1 Units 3 (Three) Times a Day., Disp: 9 each, Rfl: 9  •  Jardiance 25 MG tablet tablet, TAKE ONE TABLET BY MOUTH DAILY, Disp: 30 tablet, Rfl: 2  •  lisinopril (PRINIVIL,ZESTRIL) 10 MG tablet, Take 1 tablet by mouth Daily., Disp: 30 tablet, Rfl: 5  •  metFORMIN (GLUCOPHAGE) 500 MG tablet, TAKE TWO TABLETS BY MOUTH TWICE A DAY WITH A MEAL, Disp:  120 tablet, Rfl: 2  •  metoprolol tartrate (LOPRESSOR) 25 MG tablet, Take 0.5 tablets by mouth Every 12 (Twelve) Hours., Disp: 30 tablet, Rfl: 11  •  pantoprazole (PROTONIX) 40 MG EC tablet, Take 1 tablet by mouth 2 (Two) Times a Day., Disp: 180 tablet, Rfl: 1  •  prasugrel (EFFIENT) 10 MG tablet, Take 1 tablet by mouth Daily., Disp: 30 tablet, Rfl: 11  •  sildenafil (VIAGRA) 25 MG tablet, TAKE TWO TABLETS BY MOUTH DAILY AS NEEDED FOR ERECTILE DYSFUNCTION, Disp: 90 tablet, Rfl: 0  •  Tresiba FlexTouch 100 UNIT/ML solution pen-injector injection, Inject 32 Units under the skin into the appropriate area as directed Every Night. Doing in am now, Disp: 200 mL, Rfl: 1  •  Semaglutide (Rybelsus) 3 MG tablet, Take 1 tablet by mouth Daily., Disp: 30 tablet, Rfl: 0     Objective     History of Present Illness  Pt here today for follow up on hospitalization from 1/10-1/12 at [facility] for c/o progressively worsening exertional chest pain, found to have normal troponin and nonspecific inferolateral ST-T changes on EKG. Pt dx with unstable angina.  Pt had cardiac cath with PCI on 1/11 by Dr. Cristobal. Pt was started on effient and metoprolol while in hospital. Pt has a follow up appt with Dr. Cristobal, cardiologist on 1/24 per pt. Pt stating they are planning on sending him to cardiac rehab.     Pt here today also for follow up on HLD. Pt stating he is feeling better today and not having any chest pain. Pt does c/o right lower anterior chest pain that comes and goes occasionally. Pt was found to have a 1.2 cm nodular opacity within the right lower lobe just above the dome of the diaphragm. There is also mild right infrahilar barbara enlargement. Plan to repeat a CT in 3 months for further eval of the nodular opacity. Pt stating his blood sugars at home has been running  in am and after dinner running 200-300. Pt stating he has been off and on his trulicity for past 3 months due to pharmacy not having medication in stock. Pt stating he is  eating ok and drinking water through out day.        Review of Systems   Constitutional: Negative.    HENT: Negative for congestion, ear pain, postnasal drip and sore throat.    Respiratory: Positive for cough (off and on). Negative for shortness of breath.    Cardiovascular: Positive for chest pain (right anterior pain noted occassionally).   Gastrointestinal: Negative.    Genitourinary: Negative.    Neurological: Negative.        Physical Exam  Vitals reviewed.   HENT:      Head: Normocephalic.   Eyes:      Pupils: Pupils are equal, round, and reactive to light.   Neck:      Vascular: No carotid bruit.   Cardiovascular:      Rate and Rhythm: Normal rate and regular rhythm.      Pulses: Normal pulses.   Pulmonary:      Effort: Pulmonary effort is normal.   Musculoskeletal:         General: Normal range of motion.      Right lower leg: No edema.      Left lower leg: No edema.   Skin:     General: Skin is warm and dry.   Neurological:      Mental Status: He is alert and oriented to person, place, and time.   Psychiatric:         Behavior: Behavior normal.           Recent Results (from the past 672 hour(s))   ECG 12 Lead Chest Pain    Collection Time: 01/10/23  4:50 PM   Result Value Ref Range    QT Interval 377 ms   Comprehensive Metabolic Panel    Collection Time: 01/10/23  5:18 PM    Specimen: Blood   Result Value Ref Range    Glucose 227 (H) 65 - 99 mg/dL    BUN 25 (H) 8 - 23 mg/dL    Creatinine 1.22 0.76 - 1.27 mg/dL    Sodium 139 136 - 145 mmol/L    Potassium 4.5 3.5 - 5.2 mmol/L    Chloride 103 98 - 107 mmol/L    CO2 24.5 22.0 - 29.0 mmol/L    Calcium 9.0 8.6 - 10.5 mg/dL    Total Protein 6.2 6.0 - 8.5 g/dL    Albumin 4.0 3.5 - 5.2 g/dL    ALT (SGPT) 12 1 - 41 U/L    AST (SGOT) 15 1 - 40 U/L    Alkaline Phosphatase 75 39 - 117 U/L    Total Bilirubin 0.2 0.0 - 1.2 mg/dL    Globulin 2.2 gm/dL    A/G Ratio 1.8 g/dL    BUN/Creatinine Ratio 20.5 7.0 - 25.0    Anion Gap 11.5 5.0 - 15.0 mmol/L    eGFR 67.0 >60.0  mL/min/1.73   Troponin    Collection Time: 01/10/23  5:18 PM    Specimen: Blood   Result Value Ref Range    Troponin T 0.034 (C) 0.000 - 0.030 ng/mL   Green Top (Gel)    Collection Time: 01/10/23  5:18 PM   Result Value Ref Range    Extra Tube Hold for add-ons.    Lavender Top    Collection Time: 01/10/23  5:18 PM   Result Value Ref Range    Extra Tube hold for add-on    Gold Top - SST    Collection Time: 01/10/23  5:18 PM   Result Value Ref Range    Extra Tube Hold for add-ons.    Light Blue Top    Collection Time: 01/10/23  5:18 PM   Result Value Ref Range    Extra Tube Hold for add-ons.    CBC Auto Differential    Collection Time: 01/10/23  5:18 PM    Specimen: Blood   Result Value Ref Range    WBC 5.83 3.40 - 10.80 10*3/mm3    RBC 4.31 4.14 - 5.80 10*6/mm3    Hemoglobin 13.0 13.0 - 17.7 g/dL    Hematocrit 39.5 37.5 - 51.0 %    MCV 91.6 79.0 - 97.0 fL    MCH 30.2 26.6 - 33.0 pg    MCHC 32.9 31.5 - 35.7 g/dL    RDW 12.3 12.3 - 15.4 %    RDW-SD 41.1 37.0 - 54.0 fl    MPV 9.1 6.0 - 12.0 fL    Platelets 291 140 - 450 10*3/mm3    Neutrophil % 65.2 42.7 - 76.0 %    Lymphocyte % 23.3 19.6 - 45.3 %    Monocyte % 8.4 5.0 - 12.0 %    Eosinophil % 1.9 0.3 - 6.2 %    Basophil % 0.9 0.0 - 1.5 %    Immature Grans % 0.3 0.0 - 0.5 %    Neutrophils, Absolute 3.80 1.70 - 7.00 10*3/mm3    Lymphocytes, Absolute 1.36 0.70 - 3.10 10*3/mm3    Monocytes, Absolute 0.49 0.10 - 0.90 10*3/mm3    Eosinophils, Absolute 0.11 0.00 - 0.40 10*3/mm3    Basophils, Absolute 0.05 0.00 - 0.20 10*3/mm3    Immature Grans, Absolute 0.02 0.00 - 0.05 10*3/mm3    nRBC 0.0 0.0 - 0.2 /100 WBC   Protime-INR    Collection Time: 01/10/23  5:18 PM    Specimen: Blood   Result Value Ref Range    Protime 12.1 11.7 - 14.2 Seconds    INR 0.89 (L) 0.90 - 1.10   D-dimer, Quantitative    Collection Time: 01/10/23  5:18 PM    Specimen: Blood   Result Value Ref Range    D-Dimer, Quantitative 0.75 (H) 0.00 - 0.62 MCGFEU/mL   BNP    Collection Time: 01/10/23  5:18 PM     Specimen: Blood   Result Value Ref Range    proBNP 959.0 (H) 0.0 - 900.0 pg/mL   Troponin    Collection Time: 01/10/23  7:13 PM    Specimen: Blood   Result Value Ref Range    Troponin T 0.025 0.000 - 0.030 ng/mL   Lipid Panel    Collection Time: 01/10/23  7:13 PM    Specimen: Blood   Result Value Ref Range    Total Cholesterol 133 0 - 200 mg/dL    Triglycerides 110 0 - 150 mg/dL    HDL Cholesterol 46 40 - 60 mg/dL    LDL Cholesterol  67 0 - 100 mg/dL    VLDL Cholesterol 20 5 - 40 mg/dL    LDL/HDL Ratio 1.41    Troponin    Collection Time: 01/10/23 11:30 PM    Specimen: Arm, Left; Blood   Result Value Ref Range    Troponin T 0.021 0.000 - 0.030 ng/mL   ECG 12 Lead Chest Pain    Collection Time: 01/11/23  3:05 AM   Result Value Ref Range    QT Interval 386 ms   Basic Metabolic Panel    Collection Time: 01/11/23  5:19 AM    Specimen: Arm, Left; Blood   Result Value Ref Range    Glucose 148 (H) 65 - 99 mg/dL    BUN 18 8 - 23 mg/dL    Creatinine 0.84 0.76 - 1.27 mg/dL    Sodium 141 136 - 145 mmol/L    Potassium 4.0 3.5 - 5.2 mmol/L    Chloride 108 (H) 98 - 107 mmol/L    CO2 25.9 22.0 - 29.0 mmol/L    Calcium 8.8 8.6 - 10.5 mg/dL    BUN/Creatinine Ratio 21.4 7.0 - 25.0    Anion Gap 7.1 5.0 - 15.0 mmol/L    eGFR 98.6 >60.0 mL/min/1.73   CBC (No Diff)    Collection Time: 01/11/23  5:19 AM    Specimen: Arm, Left; Blood   Result Value Ref Range    WBC 5.86 3.40 - 10.80 10*3/mm3    RBC 4.13 (L) 4.14 - 5.80 10*6/mm3    Hemoglobin 12.5 (L) 13.0 - 17.7 g/dL    Hematocrit 37.2 (L) 37.5 - 51.0 %    MCV 90.1 79.0 - 97.0 fL    MCH 30.3 26.6 - 33.0 pg    MCHC 33.6 31.5 - 35.7 g/dL    RDW 12.8 12.3 - 15.4 %    RDW-SD 42.2 37.0 - 54.0 fl    MPV 9.0 6.0 - 12.0 fL    Platelets 276 140 - 450 10*3/mm3   Troponin    Collection Time: 01/11/23  5:19 AM    Specimen: Arm, Left; Blood   Result Value Ref Range    Troponin T 0.016 0.000 - 0.030 ng/mL   POC Glucose Once    Collection Time: 01/11/23  7:43 AM    Specimen: Blood   Result Value Ref  Range    Glucose 130 70 - 130 mg/dL   POC Glucose Once    Collection Time: 01/11/23 12:23 PM    Specimen: Blood   Result Value Ref Range    Glucose 113 70 - 130 mg/dL   POC Activated Clotting Time    Collection Time: 01/11/23  3:55 PM    Specimen: Blood   Result Value Ref Range    Activated Clotting Time  281 (H) 82 - 152 Seconds   POC Activated Clotting Time    Collection Time: 01/11/23  4:24 PM    Specimen: Blood   Result Value Ref Range    Activated Clotting Time  341 (H) 82 - 152 Seconds   POC Activated Clotting Time    Collection Time: 01/11/23  4:50 PM    Specimen: Blood   Result Value Ref Range    Activated Clotting Time  263 (H) 82 - 152 Seconds   POC Glucose Once    Collection Time: 01/11/23  9:00 PM    Specimen: Blood   Result Value Ref Range    Glucose 377 (H) 70 - 130 mg/dL   POC Glucose Once    Collection Time: 01/12/23 12:45 AM    Specimen: Blood   Result Value Ref Range    Glucose 201 (H) 70 - 130 mg/dL   CBC (No Diff)    Collection Time: 01/12/23  3:36 AM    Specimen: Blood   Result Value Ref Range    WBC 5.62 3.40 - 10.80 10*3/mm3    RBC 3.89 (L) 4.14 - 5.80 10*6/mm3    Hemoglobin 11.8 (L) 13.0 - 17.7 g/dL    Hematocrit 33.9 (L) 37.5 - 51.0 %    MCV 87.1 79.0 - 97.0 fL    MCH 30.3 26.6 - 33.0 pg    MCHC 34.8 31.5 - 35.7 g/dL    RDW 12.6 12.3 - 15.4 %    RDW-SD 40.1 37.0 - 54.0 fl    MPV 8.8 6.0 - 12.0 fL    Platelets 250 140 - 450 10*3/mm3   Basic Metabolic Panel    Collection Time: 01/12/23  3:36 AM    Specimen: Blood   Result Value Ref Range    Glucose 118 (H) 65 - 99 mg/dL    BUN 23 8 - 23 mg/dL    Creatinine 0.90 0.76 - 1.27 mg/dL    Sodium 136 136 - 145 mmol/L    Potassium 3.9 3.5 - 5.2 mmol/L    Chloride 105 98 - 107 mmol/L    CO2 25.5 22.0 - 29.0 mmol/L    Calcium 8.5 (L) 8.6 - 10.5 mg/dL    BUN/Creatinine Ratio 25.6 (H) 7.0 - 25.0    Anion Gap 5.5 5.0 - 15.0 mmol/L    eGFR 96.6 >60.0 mL/min/1.73   Lipid Panel    Collection Time: 01/12/23  3:36 AM    Specimen: Blood   Result Value Ref Range     Total Cholesterol 131 0 - 200 mg/dL    Triglycerides 204 (H) 0 - 150 mg/dL    HDL Cholesterol 42 40 - 60 mg/dL    LDL Cholesterol  56 0 - 100 mg/dL    VLDL Cholesterol 33 5 - 40 mg/dL    LDL/HDL Ratio 1.15    Hemoglobin A1c    Collection Time: 01/12/23  3:37 AM    Specimen: Blood   Result Value Ref Range    Hemoglobin A1C 8.00 (H) 4.80 - 5.60 %   ECG 12 Lead    Collection Time: 01/12/23  4:45 AM   Result Value Ref Range    QT Interval 407 ms   POC Glucose Once    Collection Time: 01/12/23  6:02 AM    Specimen: Blood   Result Value Ref Range    Glucose 104 70 - 130 mg/dL   POC Glucose Once    Collection Time: 01/12/23 11:11 AM    Specimen: Blood   Result Value Ref Range    Glucose 209 (H) 70 - 130 mg/dL       ASSESSMENT     Problems Addressed this Visit        Cardiac and Vasculature    Mixed hyperlipidemia - Primary    Hypertension       Endocrine and Metabolic    Type 2 diabetes mellitus without complication, with long-term current use of insulin (HCC)    Relevant Medications    Semaglutide (Rybelsus) 3 MG tablet   Other Visit Diagnoses     Pulmonary nodule        Relevant Orders    CT Chest With Contrast      Diagnoses       Codes Comments    Mixed hyperlipidemia    -  Primary ICD-10-CM: E78.2  ICD-9-CM: 272.2     Primary hypertension     ICD-10-CM: I10  ICD-9-CM: 401.9     Type 2 diabetes mellitus without complication, with long-term current use of insulin (HCC)     ICD-10-CM: E11.9, Z79.4  ICD-9-CM: 250.00, V58.67     Pulmonary nodule     ICD-10-CM: R91.1  ICD-9-CM: 793.11           Current outpatient and discharge medications have been reconciled for the patient.  Reviewed by: NEELA Champagne      PLAN    Patient Instructions   HLD: stable, continue atorvastatin 40 mg 1 tab daily; continue to eat a heart healthy diet, drink plenty of water through out day      HTN: stable (pt was taking full tab of metoprolol twice daily instead of the 1/2 tab); continue lisinopril 10 mg 1 tab daily, metoprolol 25 mg  0.5 tab every 12 hours    DM: switching from trulicity to rybelsus due to not being able to get medication from pharmacy in a timely manner. Starting on low dose rybelsus and pt to rto in 1 month for follow up. Pt given a one month sample supply. Continue Jardiance 25 mg 1 tab daily, metformin 500 mg two tabs twice a day, and tresiba 32 units SQ q pm; continue to check blood sugar 2-3 times a day and call/rto with any hypoglycemic or hyperglycemic results; follow up in 1 month for recheck of HgA1c and glucose.    Pulmonary nodule: rechecking CT in 3 months, order placed today.    Return if symptoms worsen or fail to improve, for one month for follow up on DM/recheck HgA1c and glucose.

## 2023-01-20 NOTE — PATIENT INSTRUCTIONS
HLD: stable, continue atorvastatin 40 mg 1 tab daily; continue to eat a heart healthy diet, drink plenty of water through out day      HTN: stable (pt was taking full tab of metoprolol twice daily instead of the 1/2 tab); continue lisinopril 10 mg 1 tab daily, metoprolol 25 mg 0.5 tab every 12 hours    DM: switching from trulicity to rybelsus due to not being able to get medication from pharmacy in a timely manner. Starting on low dose rybelsus and pt to rto in 1 month for follow up. Pt given a one month sample supply. Continue Jardiance 25 mg 1 tab daily, metformin 500 mg two tabs twice a day, and tresiba 32 units SQ q pm; continue to check blood sugar 2-3 times a day and call/rto with any hypoglycemic or hyperglycemic results; follow up in 1 month for recheck of HgA1c and glucose.    Pulmonary nodule: rechecking CT in 3 months, order placed today.

## 2023-01-23 NOTE — PROGRESS NOTES
Subjective:     Encounter Date:01/24/2023      Patient ID: Sadiq Aldana is a 62 y.o. male.    Chief Complaint:  Hospital follow-up after recent PCI      HPI:   62 y.o. male with hypertension, hyperlipidemia, diabetes (A1C 8), recent admission for unstable angina status post 3.0 x 28mm Xience Skypoint drug-eluting stent (postdilated with a 3.5 mm NC) to distal RCA/ostial PL on 1/11/23 who presents for follow-up.  Patient feels much better, his typical angina has resolved.  He currently has no complaints and denies any chest pain, shortness of breath, dyspnea on exertion.  He has been adherent to his medical regimen and does not note any side effects.      The following portions of the patient's history were reviewed and updated as appropriate: allergies, current medications, past family history, past medical history, past social history, past surgical history and problem list.     REVIEW OF SYSTEMS:   All systems reviewed.  Pertinent positives identified in HPI.  All other systems are negative.    Past Medical History:   Diagnosis Date   • Cholelithiasis     Removed   • Depression 6/22    Cwife   • Diabetes mellitus (HCC)    • Erectile dysfunction    • GERD (gastroesophageal reflux disease)    • Hyperlipidemia    • Hypertension    • Obstructive sleep apnea syndrome    • Peptic ulceration        Family History   Problem Relation Age of Onset   • Cancer Mother    • Heart disease Mother    • Other Father    • No Known Problems Sister    • No Known Problems Sister    • No Known Problems Sister    • No Known Problems Sister    • Cancer Sister    • Other Sister    • Other Sister    • Cancer Brother         throat        Social History     Socioeconomic History   • Marital status:    Tobacco Use   • Smoking status: Never   • Smokeless tobacco: Never   Vaping Use   • Vaping Use: Never used   Substance and Sexual Activity   • Alcohol use: Yes     Comment: Less than 2 a month   • Drug use: Never   • Sexual  activity: Yes     Partners: Female       No Known Allergies    Past Surgical History:   Procedure Laterality Date   • CARDIAC CATHETERIZATION N/A 1/11/2023    Procedure: Left Heart Cath;  Surgeon: Jacque Camp MD;  Location:  [facility];  Service: Cardiology;  Laterality: N/A;   • CARDIAC CATHETERIZATION N/A 1/11/2023    Procedure: Coronary angiography;  Surgeon: Jacque Camp MD;  Location:  [facility];  Service: Cardiology;  Laterality: N/A;   • CARDIAC CATHETERIZATION N/A 1/11/2023    Procedure: Left ventriculography;  Surgeon: Jacque Camp MD;  Location:  [facility];  Service: Cardiology;  Laterality: N/A;   • CARDIAC CATHETERIZATION N/A 1/11/2023    Procedure: Percutaneous Coronary Intervention;  Surgeon: Jacque Camp MD;  Location:  [facility];  Service: Cardiology;  Laterality: N/A;   • CARDIAC CATHETERIZATION N/A 1/11/2023    Procedure: Stent EUGENIO coronary;  Surgeon: Jacque Camp MD;  Location:  [facility];  Service: Cardiology;  Laterality: N/A;   • CHOLECYSTECTOMY     • FRACTURE SURGERY     • SHOULDER ARTHROSCOPY Bilateral     removal of bone spurs         ECG 12 Lead    Date/Time: 1/24/2023 2:44 PM  Performed by: Devon Cristobal MD  Authorized by: Devon Cristobal MD   Comparison: compared with previous ECG from 1/12/2023  Similar to previous ECG  Rhythm: sinus rhythm  Rate: normal  Conduction: conduction normal  Q waves: III and aVF    ST Segments: ST segments normal  T Waves: T waves normal  QRS axis: normal    Clinical impression: abnormal EKG and myocardial infarction               Objective:         Vitals:    01/24/23 1433   BP: 110/70   Pulse: 82       PHYSICAL EXAM:  GEN: well appearing, in NAD   HEENT: NCAT, EOMI, moist mucus membranes   Respiratory: CTAB, no wheezes, rales or rhonchi  CV: normal rate, regular rhythm, normal S1, S2, no murmurs, rubs, gallops, right radial access site CDI, no  hematoma, pulse 2+, mild ecchymosis over forearm  GI: soft, nontender, nondistended  MSK: no edema  Skin: no rash, warm, dry  Heme/Lymph: no bruising or bleeding  Psych: organized thought, normal behavior and affect  Neuro: Alert and Oriented x 3, grossly normal motor function        Assessment:         (I25.10) Coronary artery disease involving native coronary artery of native heart without angina pectoris - Plan: ECG 12 Lead, Adult Transthoracic Echo Complete w/ Color, Spectral and Contrast if Necessary Per Protocol    (I10) Primary hypertension    (E78.2) Mixed hyperlipidemia    (Z95.5) S/P right coronary artery (RCA) stent placement    62 year old man with hypertension, hyperlipidemia, diabetes (A1C 8), recent admission for unstable angina status post 3.0 x 28mm Xience Skypoint drug-eluting stent (postdilated with a 3.5 mm NC) to distal RCA/ostial PL on 1/11/23 who presents for follow-up.         Plan:       #Coronary artery disease with recent PCI  Recent admission for unstable angina status post 3.0 x 28mm Xience Skypoint drug-eluting stent (postdilated the 3 5 mm NC) to distal RCA/ostial PL  -Continue aspirin 81 mg daily, prasugrel 10 mg daily, atorvastatin 40 mg daily  -Continue metoprolol 20 mg twice daily, lisinopril 10 mg daily  -TTE for baseline assessment after unstable angina presentation    #Hypertension  -Continue metoprolol, lisinopril as above    #Hyperlipidemia  -Continue atorvastatin as above      Dr Bonner, thank you very much for referring this kind patient to me. Please call me with any questions or concerns. I will see the patient again in the office in 6 months or earlier as needed.         Devon Cristobal MD  01/24/23  [facility]    Outpatient Encounter Medications as of 1/24/2023   Medication Sig Dispense Refill   • aspirin 81 MG EC tablet Take 1 tablet by mouth Daily. 90 tablet 4   • atorvastatin (Lipitor) 40 MG tablet Take 1 tablet by mouth Daily. 30 tablet 5   •  cholecalciferol (VITAMIN D3) 10 MCG (400 UNIT) tablet Take 1 tablet by mouth Daily. 90 tablet 4   • Continuous Blood Gluc Sensor (FreeStyle Eulalio 14 Day Sensor) misc 1 Units 3 (Three) Times a Day. 9 each 9   • Jardiance 25 MG tablet tablet TAKE ONE TABLET BY MOUTH DAILY 30 tablet 2   • lisinopril (PRINIVIL,ZESTRIL) 10 MG tablet Take 1 tablet by mouth Daily. 30 tablet 5   • metFORMIN (GLUCOPHAGE) 500 MG tablet TAKE TWO TABLETS BY MOUTH TWICE A DAY WITH A MEAL 120 tablet 2   • metoprolol tartrate (LOPRESSOR) 25 MG tablet Take 0.5 tablets by mouth Every 12 (Twelve) Hours. 30 tablet 11   • pantoprazole (PROTONIX) 40 MG EC tablet Take 1 tablet by mouth 2 (Two) Times a Day. 180 tablet 1   • prasugrel (EFFIENT) 10 MG tablet Take 1 tablet by mouth Daily. 30 tablet 11   • Semaglutide (Rybelsus) 3 MG tablet Take 1 tablet by mouth Daily. 30 tablet 0   • sildenafil (VIAGRA) 25 MG tablet TAKE TWO TABLETS BY MOUTH DAILY AS NEEDED FOR ERECTILE DYSFUNCTION 90 tablet 0   • Tresiba FlexTouch 100 UNIT/ML solution pen-injector injection Inject 32 Units under the skin into the appropriate area as directed Every Night. Doing in am now 200 mL 1     No facility-administered encounter medications on file as of 1/24/2023.

## 2023-01-24 ENCOUNTER — OFFICE VISIT (OUTPATIENT)
Dept: CARDIOLOGY | Facility: CLINIC | Age: 63
End: 2023-01-24
Payer: COMMERCIAL

## 2023-01-24 VITALS
DIASTOLIC BLOOD PRESSURE: 70 MMHG | BODY MASS INDEX: 29.7 KG/M2 | SYSTOLIC BLOOD PRESSURE: 110 MMHG | HEIGHT: 67 IN | HEART RATE: 82 BPM | WEIGHT: 189.2 LBS

## 2023-01-24 DIAGNOSIS — Z95.5 S/P RIGHT CORONARY ARTERY (RCA) STENT PLACEMENT: ICD-10-CM

## 2023-01-24 DIAGNOSIS — I25.10 CORONARY ARTERY DISEASE INVOLVING NATIVE CORONARY ARTERY OF NATIVE HEART WITHOUT ANGINA PECTORIS: Primary | ICD-10-CM

## 2023-01-24 DIAGNOSIS — I10 PRIMARY HYPERTENSION: ICD-10-CM

## 2023-01-24 DIAGNOSIS — E78.2 MIXED HYPERLIPIDEMIA: ICD-10-CM

## 2023-01-24 PROCEDURE — 93000 ELECTROCARDIOGRAM COMPLETE: CPT | Performed by: STUDENT IN AN ORGANIZED HEALTH CARE EDUCATION/TRAINING PROGRAM

## 2023-01-24 PROCEDURE — 99214 OFFICE O/P EST MOD 30 MIN: CPT | Performed by: STUDENT IN AN ORGANIZED HEALTH CARE EDUCATION/TRAINING PROGRAM

## 2023-02-13 ENCOUNTER — HOSPITAL ENCOUNTER (OUTPATIENT)
Dept: CARDIOLOGY | Facility: HOSPITAL | Age: 63
Discharge: HOME | End: 2023-02-13
Admitting: STUDENT IN AN ORGANIZED HEALTH CARE EDUCATION/TRAINING PROGRAM
Payer: COMMERCIAL

## 2023-02-13 VITALS
SYSTOLIC BLOOD PRESSURE: 130 MMHG | WEIGHT: 189 LBS | HEART RATE: 80 BPM | DIASTOLIC BLOOD PRESSURE: 70 MMHG | HEIGHT: 67 IN | OXYGEN SATURATION: 98 % | BODY MASS INDEX: 29.66 KG/M2

## 2023-02-13 DIAGNOSIS — I25.10 CORONARY ARTERY DISEASE INVOLVING NATIVE CORONARY ARTERY OF NATIVE HEART WITHOUT ANGINA PECTORIS: ICD-10-CM

## 2023-02-13 LAB
AORTIC ARCH: 2.2 CM
ASCENDING AORTA: 3.3 CM
BH CV ECHO MEAS - ACS: 1.91 CM
BH CV ECHO MEAS - AO MAX PG: 3.6 MMHG
BH CV ECHO MEAS - AO MEAN PG: 2.28 MMHG
BH CV ECHO MEAS - AO ROOT DIAM: 3.7 CM
BH CV ECHO MEAS - AO V2 MAX: 95.4 CM/SEC
BH CV ECHO MEAS - AO V2 VTI: 22.2 CM
BH CV ECHO MEAS - AVA(I,D): 2.9 CM2
BH CV ECHO MEAS - EDV(CUBED): 52.4 ML
BH CV ECHO MEAS - EDV(MOD-SP2): 96 ML
BH CV ECHO MEAS - EDV(MOD-SP4): 98 ML
BH CV ECHO MEAS - EF(MOD-BP): 60.7 %
BH CV ECHO MEAS - EF(MOD-SP2): 63.5 %
BH CV ECHO MEAS - EF(MOD-SP4): 58.2 %
BH CV ECHO MEAS - EF_3D-VOL: 64 %
BH CV ECHO MEAS - ESV(CUBED): 18 ML
BH CV ECHO MEAS - ESV(MOD-SP2): 35 ML
BH CV ECHO MEAS - ESV(MOD-SP4): 41 ML
BH CV ECHO MEAS - FS: 30 %
BH CV ECHO MEAS - IVS/LVPW: 0.95 CM
BH CV ECHO MEAS - IVSD: 1.04 CM
BH CV ECHO MEAS - LA 3D VOL INDEX: 25
BH CV ECHO MEAS - LAT PEAK E' VEL: 7.2 CM/SEC
BH CV ECHO MEAS - LV DIASTOLIC VOL/BSA (35-75): 49.6 CM2
BH CV ECHO MEAS - LV MASS(C)D: 125.9 GRAMS
BH CV ECHO MEAS - LV MAX PG: 2.7 MMHG
BH CV ECHO MEAS - LV MEAN PG: 1.68 MMHG
BH CV ECHO MEAS - LV SYSTOLIC VOL/BSA (12-30): 20.8 CM2
BH CV ECHO MEAS - LV V1 MAX: 81.5 CM/SEC
BH CV ECHO MEAS - LV V1 VTI: 19.8 CM
BH CV ECHO MEAS - LVIDD: 3.7 CM
BH CV ECHO MEAS - LVIDS: 2.6 CM
BH CV ECHO MEAS - LVOT AREA: 3.2 CM2
BH CV ECHO MEAS - LVOT DIAM: 2.02 CM
BH CV ECHO MEAS - LVPWD: 1.09 CM
BH CV ECHO MEAS - MED PEAK E' VEL: 6.3 CM/SEC
BH CV ECHO MEAS - MV A DUR: 0.12 SEC
BH CV ECHO MEAS - MV A MAX VEL: 72.2 CM/SEC
BH CV ECHO MEAS - MV DEC SLOPE: 343.5 CM/SEC2
BH CV ECHO MEAS - MV DEC TIME: 0.18 MSEC
BH CV ECHO MEAS - MV E MAX VEL: 56.3 CM/SEC
BH CV ECHO MEAS - MV E/A: 0.78
BH CV ECHO MEAS - MV MAX PG: 3 MMHG
BH CV ECHO MEAS - MV MEAN PG: 1.63 MMHG
BH CV ECHO MEAS - MV P1/2T: 60.8 MSEC
BH CV ECHO MEAS - MV V2 VTI: 27.6 CM
BH CV ECHO MEAS - MVA(P1/2T): 3.6 CM2
BH CV ECHO MEAS - MVA(VTI): 2.3 CM2
BH CV ECHO MEAS - PA ACC TIME: 0.09 SEC
BH CV ECHO MEAS - PA PR(ACCEL): 37.8 MMHG
BH CV ECHO MEAS - PA V2 MAX: 89.6 CM/SEC
BH CV ECHO MEAS - PULM A REVS DUR: 0.1 SEC
BH CV ECHO MEAS - PULM A REVS VEL: 67.7 CM/SEC
BH CV ECHO MEAS - PULM DIAS VEL: 32.6 CM/SEC
BH CV ECHO MEAS - PULM S/D: 1.64
BH CV ECHO MEAS - PULM SYS VEL: 53.6 CM/SEC
BH CV ECHO MEAS - QP/QS: 0.57
BH CV ECHO MEAS - RAP SYSTOLE: 3 MMHG
BH CV ECHO MEAS - RV MAX PG: 1.57 MMHG
BH CV ECHO MEAS - RV V1 MAX: 62.6 CM/SEC
BH CV ECHO MEAS - RV V1 VTI: 14.7 CM
BH CV ECHO MEAS - RVOT DIAM: 1.76 CM
BH CV ECHO MEAS - RVSP: 21.2 MMHG
BH CV ECHO MEAS - SI(MOD-SP2): 30.9 ML/M2
BH CV ECHO MEAS - SI(MOD-SP4): 28.9 ML/M2
BH CV ECHO MEAS - SUP REN AO DIAM: 1.9 CM
BH CV ECHO MEAS - SV(LVOT): 63.4 ML
BH CV ECHO MEAS - SV(MOD-SP2): 61 ML
BH CV ECHO MEAS - SV(MOD-SP4): 57 ML
BH CV ECHO MEAS - SV(RVOT): 36 ML
BH CV ECHO MEAS - TAPSE (>1.6): 1.67 CM
BH CV ECHO MEAS - TR MAX PG: 18.2 MMHG
BH CV ECHO MEAS - TR MAX VEL: 213.1 CM/SEC
BH CV ECHO MEASUREMENTS AVERAGE E/E' RATIO: 8.34
BH CV VAS BP RIGHT ARM: NORMAL MMHG
BH CV XLRA - RV BASE: 2.09 CM
BH CV XLRA - RV LENGTH: 7.5 CM
BH CV XLRA - RV MID: 2.5 CM
BH CV XLRA - TDI S': 7.6 CM/SEC
LEFT ATRIUM VOLUME INDEX: 20 ML/M2
MAXIMAL PREDICTED HEART RATE: 158 BPM
SINUS: 3.7 CM
STJ: 3.1 CM
STRESS TARGET HR: 134 BPM

## 2023-02-13 PROCEDURE — 93306 TTE W/DOPPLER COMPLETE: CPT | Performed by: INTERNAL MEDICINE

## 2023-02-13 PROCEDURE — 93306 TTE W/DOPPLER COMPLETE: CPT

## 2023-02-16 NOTE — PROGRESS NOTES
Silver Babcock,  Good news, your echocardiogram was normal.  Please call me or message back with any questions.

## 2023-02-17 ENCOUNTER — OFFICE VISIT (OUTPATIENT)
Dept: FAMILY MEDICINE CLINIC | Facility: CLINIC | Age: 63
End: 2023-02-17
Payer: COMMERCIAL

## 2023-02-17 VITALS
DIASTOLIC BLOOD PRESSURE: 80 MMHG | HEART RATE: 84 BPM | HEIGHT: 67 IN | SYSTOLIC BLOOD PRESSURE: 124 MMHG | RESPIRATION RATE: 16 BRPM | WEIGHT: 196 LBS | OXYGEN SATURATION: 98 % | TEMPERATURE: 97.8 F | BODY MASS INDEX: 30.76 KG/M2

## 2023-02-17 DIAGNOSIS — E78.2 MIXED HYPERLIPIDEMIA: ICD-10-CM

## 2023-02-17 DIAGNOSIS — Z79.4 TYPE 2 DIABETES MELLITUS WITHOUT COMPLICATION, WITH LONG-TERM CURRENT USE OF INSULIN: ICD-10-CM

## 2023-02-17 DIAGNOSIS — E11.9 TYPE 2 DIABETES MELLITUS WITHOUT COMPLICATION, WITH LONG-TERM CURRENT USE OF INSULIN: ICD-10-CM

## 2023-02-17 DIAGNOSIS — I10 PRIMARY HYPERTENSION: ICD-10-CM

## 2023-02-17 DIAGNOSIS — K21.9 GASTROESOPHAGEAL REFLUX DISEASE, UNSPECIFIED WHETHER ESOPHAGITIS PRESENT: ICD-10-CM

## 2023-02-17 LAB
EXPIRATION DATE: ABNORMAL
GLUCOSE BLDC GLUCOMTR-MCNC: 288 MG/DL (ref 70–130)
HBA1C MFR BLD: 9 %
Lab: ABNORMAL

## 2023-02-17 PROCEDURE — 99213 OFFICE O/P EST LOW 20 MIN: CPT | Performed by: NURSE PRACTITIONER

## 2023-02-17 PROCEDURE — 83036 HEMOGLOBIN GLYCOSYLATED A1C: CPT | Performed by: NURSE PRACTITIONER

## 2023-02-17 PROCEDURE — 82962 GLUCOSE BLOOD TEST: CPT | Performed by: NURSE PRACTITIONER

## 2023-02-17 RX ORDER — LISINOPRIL 10 MG/1
10 TABLET ORAL DAILY
Qty: 30 TABLET | Refills: 5 | Status: SHIPPED | OUTPATIENT
Start: 2023-02-17

## 2023-02-17 RX ORDER — ORAL SEMAGLUTIDE 7 MG/1
7 TABLET ORAL DAILY
Qty: 30 TABLET | Refills: 2 | Status: SHIPPED | OUTPATIENT
Start: 2023-02-17

## 2023-02-17 RX ORDER — ATORVASTATIN CALCIUM 40 MG/1
40 TABLET, FILM COATED ORAL DAILY
Qty: 30 TABLET | Refills: 5 | Status: SHIPPED | OUTPATIENT
Start: 2023-02-17

## 2023-02-17 RX ORDER — PANTOPRAZOLE SODIUM 40 MG/1
40 TABLET, DELAYED RELEASE ORAL 2 TIMES DAILY
Qty: 180 TABLET | Refills: 1 | Status: SHIPPED | OUTPATIENT
Start: 2023-02-17 | End: 2023-03-13

## 2023-02-17 RX ORDER — INSULIN DEGLUDEC INJECTION 100 U/ML
32 INJECTION, SOLUTION SUBCUTANEOUS NIGHTLY
Qty: 200 ML | Refills: 5 | Status: SHIPPED | OUTPATIENT
Start: 2023-02-17

## 2023-02-17 NOTE — PROGRESS NOTES
Subjective     Sadiq Aldana is a 62 y.o.. male.     Patient here today for follow up on diabetes. Last month patient's medications switched from trulicity to rybelsus due to not being able to get medication from pharmacy in a timely manner. Started on low dose rybelsus 3 mg daily in pm; patient was to continue Jardiance 25 mg 1 tab am daily, metformin 500 mg two tabs twice a day, and tresiba 32 units SQ q pm. Patient was to continue to check blood sugar 2-3 times a day. Patient stating today that his blood sugars have been running 125-190 in am and running around 230 in pm. Patient stating he had breakfast before coming into office this am that included a bisquit with meat. Patient's blood sugar this am in office was 288. Patient's last hga1c was 8.0 and today it is 9.0. Patient eating ok and drinking water through out day.       The following portions of the patient's history were reviewed and updated as appropriate: allergies, current medications, past family history, past medical history, past social history, past surgical history and problem list.    Past Medical History:   Diagnosis Date   • Cholelithiasis     Removed   • Depression 6/22    Cwife   • Diabetes mellitus (HCC)    • Erectile dysfunction    • GERD (gastroesophageal reflux disease)    • Hyperlipidemia    • Hypertension    • Obstructive sleep apnea syndrome    • Peptic ulceration        Past Surgical History:   Procedure Laterality Date   • CARDIAC CATHETERIZATION N/A 1/11/2023    Procedure: Left Heart Cath;  Surgeon: Jacque Camp MD;  Location: [facility] INVASIVE LOCATION;  Service: Cardiology;  Laterality: N/A;   • CARDIAC CATHETERIZATION N/A 1/11/2023    Procedure: Coronary angiography;  Surgeon: Jacque Camp MD;  Location: [facility] CATH INVASIVE LOCATION;  Service: Cardiology;  Laterality: N/A;   • CARDIAC CATHETERIZATION N/A 1/11/2023    Procedure: Left ventriculography;  Surgeon: Jacque Camp MD;  Location: [facility] INVASIVE  "LOCATION;  Service: Cardiology;  Laterality: N/A;   • CARDIAC CATHETERIZATION N/A 1/11/2023    Procedure: Percutaneous Coronary Intervention;  Surgeon: Jacque Camp MD;  Location: [facility] CATH INVASIVE LOCATION;  Service: Cardiology;  Laterality: N/A;   • CARDIAC CATHETERIZATION N/A 1/11/2023    Procedure: Stent EUGENIO coronary;  Surgeon: Jacque Camp MD;  Location:  [facility] CATH INVASIVE LOCATION;  Service: Cardiology;  Laterality: N/A;   • CHOLECYSTECTOMY     • FRACTURE SURGERY     • SHOULDER ARTHROSCOPY Bilateral     removal of bone spurs       Review of Systems   Constitutional: Negative.    Respiratory: Negative.    Cardiovascular: Negative.    Gastrointestinal: Negative.  Negative for diarrhea and nausea.   Genitourinary: Negative.    Neurological: Negative.  Negative for light-headedness and headaches.       No Known Allergies    Objective     Vitals:    02/17/23 0946   BP: 124/80   BP Location: Left arm   Patient Position: Sitting   Cuff Size: Adult   Pulse: 84   Resp: 16   Temp: 97.8 °F (36.6 °C)   TempSrc: Temporal   SpO2: 98%   Weight: 88.9 kg (196 lb)   Height: 170.2 cm (67.01\")     Body mass index is 30.69 kg/m².    Physical Exam  Vitals reviewed.   HENT:      Head: Normocephalic.   Eyes:      Pupils: Pupils are equal, round, and reactive to light.   Cardiovascular:      Rate and Rhythm: Normal rate and regular rhythm.   Pulmonary:      Effort: Pulmonary effort is normal.      Breath sounds: Normal breath sounds.   Musculoskeletal:         General: Normal range of motion.   Skin:     General: Skin is warm and dry.   Neurological:      Mental Status: He is alert and oriented to person, place, and time.   Psychiatric:         Behavior: Behavior normal.           Current Outpatient Medications:   •  aspirin 81 MG EC tablet, Take 1 tablet by mouth Daily., Disp: 90 tablet, Rfl: 4  •  atorvastatin (Lipitor) 40 MG tablet, Take 1 tablet by mouth Daily., Disp: 30 tablet, Rfl: 5  •  cholecalciferol (VITAMIN D3) " 10 MCG (400 UNIT) tablet, Take 1 tablet by mouth Daily., Disp: 90 tablet, Rfl: 4  •  Continuous Blood Gluc Sensor (FreeStyle Eulalio 14 Day Sensor) misc, 1 Units 3 (Three) Times a Day., Disp: 9 each, Rfl: 9  •  empagliflozin (Jardiance) 25 MG tablet tablet, Take 1 tablet by mouth Daily., Disp: 30 tablet, Rfl: 5  •  lisinopril (PRINIVIL,ZESTRIL) 10 MG tablet, Take 1 tablet by mouth Daily., Disp: 30 tablet, Rfl: 5  •  metFORMIN (GLUCOPHAGE) 500 MG tablet, Take 2 tablets by mouth 2 (Two) Times a Day With Meals., Disp: 120 tablet, Rfl: 5  •  metoprolol tartrate (LOPRESSOR) 25 MG tablet, Take 0.5 tablets by mouth Every 12 (Twelve) Hours., Disp: 30 tablet, Rfl: 11  •  pantoprazole (PROTONIX) 40 MG EC tablet, Take 1 tablet by mouth 2 (Two) Times a Day., Disp: 180 tablet, Rfl: 1  •  prasugrel (EFFIENT) 10 MG tablet, Take 1 tablet by mouth Daily., Disp: 30 tablet, Rfl: 11  •  sildenafil (VIAGRA) 25 MG tablet, TAKE TWO TABLETS BY MOUTH DAILY AS NEEDED FOR ERECTILE DYSFUNCTION, Disp: 90 tablet, Rfl: 0  •  Tresiba FlexTouch 100 UNIT/ML solution pen-injector injection, Inject 32 Units under the skin into the appropriate area as directed Every Night. Doing in am now, Disp: 200 mL, Rfl: 5  •  Semaglutide (Rybelsus) 7 MG tablet, Take 7 mg by mouth Daily., Disp: 30 tablet, Rfl: 2    Recent Results (from the past 84 hour(s))   POC Glycosylated Hemoglobin (Hb A1C)    Collection Time: 02/17/23 10:24 AM    Specimen: Blood   Result Value Ref Range    Hemoglobin A1C 9 %    Lot Number [id number]     Expiration Date 04/01/2025    POCT Glucose    Collection Time: 02/17/23 10:25 AM    Specimen: Blood   Result Value Ref Range    Glucose 288 (A) 70 - 130 mg/dL       Adult Transthoracic Echo Complete w/ Color, Spectral and Contrast if Necessary Per Protocol  •  Left ventricular systolic function is normal. Calculated left   ventricular EF = 60.7% Normal left ventricular cavity size and wall   thickness noted. All left ventricular wall segments  contract normally.   Left ventricular diastolic function is consistent with (grade I) impaired   relaxation.  •  Trace tricuspid valve regurgitation is present. Estimated right   ventricular systolic pressure from tricuspid regurgitation is normal (<35   mmHg).        Diagnoses and all orders for this visit:    1. Type 2 diabetes mellitus without complication, with long-term current use of insulin (HCC)  -     Semaglutide (Rybelsus) 7 MG tablet; Take 7 mg by mouth Daily.  Dispense: 30 tablet; Refill: 2  -     POC Glycosylated Hemoglobin (Hb A1C)  -     POCT Glucose  -     empagliflozin (Jardiance) 25 MG tablet tablet; Take 1 tablet by mouth Daily.  Dispense: 30 tablet; Refill: 5  -     metFORMIN (GLUCOPHAGE) 500 MG tablet; Take 2 tablets by mouth 2 (Two) Times a Day With Meals.  Dispense: 120 tablet; Refill: 5  -     Tresiba FlexTouch 100 UNIT/ML solution pen-injector injection; Inject 32 Units under the skin into the appropriate area as directed Every Night. Doing in am now  Dispense: 200 mL; Refill: 5    2. Primary hypertension  -     lisinopril (PRINIVIL,ZESTRIL) 10 MG tablet; Take 1 tablet by mouth Daily.  Dispense: 30 tablet; Refill: 5    3. Mixed hyperlipidemia  -     atorvastatin (Lipitor) 40 MG tablet; Take 1 tablet by mouth Daily.  Dispense: 30 tablet; Refill: 5    4. Gastroesophageal reflux disease, unspecified whether esophagitis present  -     pantoprazole (PROTONIX) 40 MG EC tablet; Take 1 tablet by mouth 2 (Two) Times a Day.  Dispense: 180 tablet; Refill: 1        Patient Instructions   Diabetes: Increased rybelsus from 3 mg to 7 mg daily; continue Jardiance 25 mg 1 tab am daily, metformin 500 mg two tabs twice a day, and tresiba 32 units SQ q pm. continue to check blood sugar 2-3 times a day. Follow up in 3 months.    Hypertension: stable, continue lisinopril 10 mg 1 tab daily    Hyperlipidemia: stable, continue atorvastatin 40 mg 1 tab daily; recommend patient eat a heart healthy diabetic diet, drink  plenty of water through out day, and exercise 3-5 times a week, for more than 30 minutes at a time    GERD: stable, continue pantoprazole 40 mg 1 tab daily      Return for 3 months for DM/hga1c and glucose.

## 2023-02-17 NOTE — PATIENT INSTRUCTIONS
Diabetes: Increased rybelsus from 3 mg to 7 mg daily; continue Jardiance 25 mg 1 tab am daily, metformin 500 mg two tabs twice a day, and tresiba 32 units SQ q pm. continue to check blood sugar 2-3 times a day. Follow up in 3 months.    Hypertension: stable, continue lisinopril 10 mg 1 tab daily    Hyperlipidemia: stable, continue atorvastatin 40 mg 1 tab daily; recommend patient eat a heart healthy diabetic diet, drink plenty of water through out day, and exercise 3-5 times a week, for more than 30 minutes at a time    GERD: stable, continue pantoprazole 40 mg 1 tab daily

## 2023-03-11 DIAGNOSIS — K21.9 GASTROESOPHAGEAL REFLUX DISEASE, UNSPECIFIED WHETHER ESOPHAGITIS PRESENT: ICD-10-CM

## 2023-03-13 RX ORDER — PANTOPRAZOLE SODIUM 40 MG/1
TABLET, DELAYED RELEASE ORAL
Qty: 180 TABLET | Refills: 1 | Status: SHIPPED | OUTPATIENT
Start: 2023-03-13

## 2023-05-01 RX ORDER — EMPAGLIFLOZIN, METFORMIN HYDROCHLORIDE 10; 1000 MG/1; MG/1
TABLET, EXTENDED RELEASE ORAL
Qty: 30 TABLET | OUTPATIENT
Start: 2023-05-01

## 2023-05-01 NOTE — TELEPHONE ENCOUNTER
Rx Refill Note  Requested Prescriptions     Pending Prescriptions Disp Refills   • Synjardy XR  MG tablet sustained-release 24 hour [Pharmacy Med Name: SYNJARDY XR 10-1,000 MG TABLET] 30 tablet      Sig: TAKE ONE TABLET BY MOUTH DAILY      Last office visit with prescribing clinician: 2/17/2023   Last telemedicine visit with prescribing clinician: 5/15/2023   Next office visit with prescribing clinician: 5/15/2023                         Would you like a call back once the refill request has been completed: [] Yes [] No    If the office needs to give you a call back, can they leave a voicemail: [] Yes [] No    Bobbi Cordoba MA  05/01/23, 07:34 EDT

## 2023-05-15 ENCOUNTER — OFFICE VISIT (OUTPATIENT)
Dept: FAMILY MEDICINE CLINIC | Facility: CLINIC | Age: 63
End: 2023-05-15
Payer: COMMERCIAL

## 2023-05-15 VITALS
OXYGEN SATURATION: 98 % | HEART RATE: 88 BPM | DIASTOLIC BLOOD PRESSURE: 70 MMHG | SYSTOLIC BLOOD PRESSURE: 118 MMHG | BODY MASS INDEX: 30.76 KG/M2 | WEIGHT: 196 LBS | TEMPERATURE: 98.4 F | HEIGHT: 67 IN | RESPIRATION RATE: 16 BRPM

## 2023-05-15 DIAGNOSIS — Z79.4 TYPE 2 DIABETES MELLITUS WITHOUT COMPLICATION, WITH LONG-TERM CURRENT USE OF INSULIN: Primary | ICD-10-CM

## 2023-05-15 DIAGNOSIS — E11.9 TYPE 2 DIABETES MELLITUS WITHOUT COMPLICATION, WITH LONG-TERM CURRENT USE OF INSULIN: Primary | ICD-10-CM

## 2023-05-15 DIAGNOSIS — I10 PRIMARY HYPERTENSION: ICD-10-CM

## 2023-05-15 DIAGNOSIS — E78.2 MIXED HYPERLIPIDEMIA: ICD-10-CM

## 2023-05-15 RX ORDER — LISINOPRIL 10 MG/1
10 TABLET ORAL DAILY
Qty: 30 TABLET | Refills: 5 | Status: SHIPPED | OUTPATIENT
Start: 2023-05-15

## 2023-05-15 RX ORDER — EMPAGLIFLOZIN, METFORMIN HYDROCHLORIDE 12.5; 1 MG/1; MG/1
1 TABLET, EXTENDED RELEASE ORAL 2 TIMES DAILY
Qty: 60 TABLET | Refills: 5 | Status: SHIPPED | OUTPATIENT
Start: 2023-05-15

## 2023-05-15 RX ORDER — INSULIN DEGLUDEC INJECTION 100 U/ML
32 INJECTION, SOLUTION SUBCUTANEOUS NIGHTLY
Qty: 200 ML | Refills: 5 | Status: SHIPPED | OUTPATIENT
Start: 2023-05-15

## 2023-05-15 RX ORDER — ORAL SEMAGLUTIDE 7 MG/1
7 TABLET ORAL DAILY
Qty: 30 TABLET | Refills: 5 | Status: SHIPPED | OUTPATIENT
Start: 2023-05-15

## 2023-05-15 RX ORDER — ATORVASTATIN CALCIUM 40 MG/1
40 TABLET, FILM COATED ORAL DAILY
Qty: 30 TABLET | Refills: 5 | Status: SHIPPED | OUTPATIENT
Start: 2023-05-15

## 2023-05-15 NOTE — PATIENT INSTRUCTIONS
Diabetes: switching to Synjardy XR 12.5/1000 mg 1 tab twice a day (to equal metformin 1000 mg twice a day and Jardiance 25 mg daily); continue rybelsus 7 mg 1 tab daily and tresiba 32 units SQ q pm. Recommend getting vision screening done with optometrist for diabetes eye exam this year. HgA1c being done in lab today for further eval.    Hypertension: stable continue lisinopril 10 mg 1 tab daily. Continue to eat a heart healthy diet, drink plenty of water with goal 64 oz a day and exercise 3-5 times a week, for more than 30 minutes at a time     Hyperlipidemia: stable from 1/12/23 labs, continue atorvastatin 40 mg 1 tab daily

## 2023-05-15 NOTE — PROGRESS NOTES
Subjective     Sadiq Aldana is a 62 y.o.. male.     Patient here today for follow up on diabetes and hypertension.    Patient's rybelsus was increased from 3 mg to 7 mg daily on last office visit. Patient stating he is doing well with this medication and dosage and denies any side effects. Patient is still taking his Jardiance 25 mg 1 tab am daily, metformin 500 mg two tabs twice a day, and tresiba 32 units SQ q pm. Patient stating he is checking his fasting blood sugar at home and getting around 100 each time.  Patient stating he is eating healthy and drinking water through out day. Patient stating he is walking at work for his exercise. Patient is taking his lisinopril 10 mg 1 tab daily.       The following portions of the patient's history were reviewed and updated as appropriate: allergies, current medications, past family history, past medical history, past social history, past surgical history and problem list.    Past Medical History:   Diagnosis Date   • Cholelithiasis     Removed   • Depression 6/22    Cwife   • Diabetes mellitus    • Erectile dysfunction    • GERD (gastroesophageal reflux disease)    • Hyperlipidemia    • Hypertension    • Obstructive sleep apnea syndrome    • Peptic ulceration        Past Surgical History:   Procedure Laterality Date   • CARDIAC CATHETERIZATION N/A 1/11/2023    Procedure: Left Heart Cath;  Surgeon: Jacque Camp MD;  Location: [facility] CATH INVASIVE LOCATION;  Service: Cardiology;  Laterality: N/A;   • CARDIAC CATHETERIZATION N/A 1/11/2023    Procedure: Coronary angiography;  Surgeon: Jacque Camp MD;  Location: [facility] CATH INVASIVE LOCATION;  Service: Cardiology;  Laterality: N/A;   • CARDIAC CATHETERIZATION N/A 1/11/2023    Procedure: Left ventriculography;  Surgeon: Jacque Camp MD;  Location: [facility] CATH INVASIVE LOCATION;  Service: Cardiology;  Laterality: N/A;   • CARDIAC CATHETERIZATION N/A 1/11/2023    Procedure: Percutaneous Coronary Intervention;   "Surgeon: Jacque Camp MD;  Location: [facility] CATH INVASIVE LOCATION;  Service: Cardiology;  Laterality: N/A;   • CARDIAC CATHETERIZATION N/A 1/11/2023    Procedure: Stent EUGENIO coronary;  Surgeon: Jacque Camp MD;  Location: [facility] CATH INVASIVE LOCATION;  Service: Cardiology;  Laterality: N/A;   • CHOLECYSTECTOMY     • FRACTURE SURGERY     • SHOULDER ARTHROSCOPY Bilateral     removal of bone spurs       Review of Systems   Constitutional: Negative.    Respiratory: Negative.    Cardiovascular: Negative.    Gastrointestinal: Negative.    Genitourinary: Negative.    Musculoskeletal: Negative.    Neurological: Negative.    Psychiatric/Behavioral: Negative.        No Known Allergies    Objective     Vitals:    05/15/23 0755   BP: 118/70   Pulse: 88   Resp: 16   Temp: 98.4 °F (36.9 °C)   SpO2: 98%   Weight: 88.9 kg (196 lb)   Height: 170.2 cm (67.01\")     Body mass index is 30.69 kg/m².    Physical Exam  Vitals reviewed.   HENT:      Head: Normocephalic.   Eyes:      Pupils: Pupils are equal, round, and reactive to light.   Neck:      Vascular: No carotid bruit.   Cardiovascular:      Rate and Rhythm: Normal rate and regular rhythm.      Pulses: Normal pulses.   Pulmonary:      Effort: Pulmonary effort is normal.      Breath sounds: Normal breath sounds.   Musculoskeletal:         General: Normal range of motion.      Right lower leg: No edema.      Left lower leg: No edema.   Skin:     General: Skin is warm and dry.   Neurological:      Mental Status: He is alert and oriented to person, place, and time.   Psychiatric:         Behavior: Behavior normal.           Current Outpatient Medications:   •  aspirin 81 MG EC tablet, Take 1 tablet by mouth Daily., Disp: 90 tablet, Rfl: 4  •  atorvastatin (Lipitor) 40 MG tablet, Take 1 tablet by mouth Daily., Disp: 30 tablet, Rfl: 5  •  cholecalciferol (VITAMIN D3) 10 MCG (400 UNIT) tablet, Take 1 tablet by mouth Daily., Disp: 90 tablet, Rfl: 4  •  Continuous Blood Gluc Sensor " (FreeStyle Eulalio 14 Day Sensor) misc, 1 Units 3 (Three) Times a Day., Disp: 9 each, Rfl: 9  •  lisinopril (PRINIVIL,ZESTRIL) 10 MG tablet, Take 1 tablet by mouth Daily., Disp: 30 tablet, Rfl: 5  •  metoprolol tartrate (LOPRESSOR) 25 MG tablet, Take 0.5 tablets by mouth Every 12 (Twelve) Hours., Disp: 30 tablet, Rfl: 5  •  pantoprazole (PROTONIX) 40 MG EC tablet, TAKE ONE TABLET BY MOUTH TWICE A DAY, Disp: 180 tablet, Rfl: 1  •  prasugrel (EFFIENT) 10 MG tablet, Take 1 tablet by mouth Daily., Disp: 30 tablet, Rfl: 11  •  Semaglutide (Rybelsus) 7 MG tablet, Take 7 mg by mouth Daily., Disp: 30 tablet, Rfl: 5  •  sildenafil (VIAGRA) 25 MG tablet, TAKE TWO TABLETS BY MOUTH DAILY AS NEEDED FOR ERECTILE DYSFUNCTION, Disp: 90 tablet, Rfl: 0  •  Tresiba FlexTouch 100 UNIT/ML solution pen-injector injection, Inject 32 Units under the skin into the appropriate area as directed Every Night. Doing in am now, Disp: 200 mL, Rfl: 5  •  Empagliflozin-metFORMIN HCl ER (Synjardy XR) 12.5-1000 MG tablet sustained-release 24 hour, Take 1 tablet by mouth 2 (Two) Times a Day., Disp: 60 tablet, Rfl: 5        Adult Transthoracic Echo Complete w/ Color, Spectral and Contrast if Necessary Per Protocol  •  Left ventricular systolic function is normal. Calculated left   ventricular EF = 60.7% Normal left ventricular cavity size and wall   thickness noted. All left ventricular wall segments contract normally.   Left ventricular diastolic function is consistent with (grade I) impaired   relaxation.  •  Trace tricuspid valve regurgitation is present. Estimated right   ventricular systolic pressure from tricuspid regurgitation is normal (<35   mmHg).        Diagnoses and all orders for this visit:    1. Type 2 diabetes mellitus without complication, with long-term current use of insulin (Primary)  -     Hemoglobin A1c  -     Empagliflozin-metFORMIN HCl ER (Synjardy XR) 12.5-1000 MG tablet sustained-release 24 hour; Take 1 tablet by mouth 2 (Two)  Times a Day.  Dispense: 60 tablet; Refill: 5  -     Semaglutide (Rybelsus) 7 MG tablet; Take 7 mg by mouth Daily.  Dispense: 30 tablet; Refill: 5  -     Tresiba FlexTouch 100 UNIT/ML solution pen-injector injection; Inject 32 Units under the skin into the appropriate area as directed Every Night. Doing in am now  Dispense: 200 mL; Refill: 5    2. Primary hypertension  -     lisinopril (PRINIVIL,ZESTRIL) 10 MG tablet; Take 1 tablet by mouth Daily.  Dispense: 30 tablet; Refill: 5  -     metoprolol tartrate (LOPRESSOR) 25 MG tablet; Take 0.5 tablets by mouth Every 12 (Twelve) Hours.  Dispense: 30 tablet; Refill: 5    3. Mixed hyperlipidemia  -     atorvastatin (Lipitor) 40 MG tablet; Take 1 tablet by mouth Daily.  Dispense: 30 tablet; Refill: 5        Patient Instructions   Diabetes: switching to Synjardy XR 12.5/1000 mg 1 tab twice a day (to equal metformin 1000 mg twice a day and Jardiance 25 mg daily); continue rybelsus 7 mg 1 tab daily and tresiba 32 units SQ q pm. Recommend getting vision screening done with optometrist for diabetes eye exam this year. HgA1c being done in lab today for further eval.    Hypertension: stable continue lisinopril 10 mg 1 tab daily. Continue to eat a heart healthy diet, drink plenty of water with goal 64 oz a day and exercise 3-5 times a week, for more than 30 minutes at a time     Hyperlipidemia: stable from 1/12/23 labs, continue atorvastatin 40 mg 1 tab daily      Return for fasting labs in 6 months, Annual physical.

## 2023-05-16 ENCOUNTER — TELEPHONE (OUTPATIENT)
Dept: FAMILY MEDICINE CLINIC | Facility: CLINIC | Age: 63
End: 2023-05-16
Payer: COMMERCIAL

## 2023-05-16 DIAGNOSIS — Z79.4 TYPE 2 DIABETES MELLITUS WITHOUT COMPLICATION, WITH LONG-TERM CURRENT USE OF INSULIN: Primary | ICD-10-CM

## 2023-05-16 DIAGNOSIS — E11.9 TYPE 2 DIABETES MELLITUS WITHOUT COMPLICATION, WITH LONG-TERM CURRENT USE OF INSULIN: Primary | ICD-10-CM

## 2023-05-16 LAB — HBA1C MFR BLD: 8.6 % (ref 4.8–5.6)

## 2023-05-16 NOTE — TELEPHONE ENCOUNTER
----- Message from NEELA Champagne sent at 5/16/2023  9:06 AM EDT -----  Please call Patient and inform him that his HgA1c is 8.6; last HgA1c was 9; so it is improving; I would like for him to come back in 3 months for recheck of [id number] can put in order to have done in lab in 3 months at new office; it will be under lab abel with lab abel pricing. thanks

## 2023-05-16 NOTE — TELEPHONE ENCOUNTER
Spoke with pt regarding results, pt will be making an appt in 3 months for lab, pt verbally understood.

## 2023-08-23 RX ORDER — DULAGLUTIDE 3 MG/.5ML
INJECTION, SOLUTION SUBCUTANEOUS
OUTPATIENT
Start: 2023-08-23

## 2023-08-23 NOTE — TELEPHONE ENCOUNTER
Rx Refill Note  Requested Prescriptions     Pending Prescriptions Disp Refills    Trulicity 3 MG/0.5ML solution pen-injector        Last office visit with prescribing clinician: 7/19/2023   Last telemedicine visit with prescribing clinician: Visit date not found   Next office visit with prescribing clinician: Visit date not found                         Would you like a call back once the refill request has been completed: [] Yes [] No    If the office needs to give you a call back, can they leave a voicemail: [] Yes [] No    Bobbi Cordoba MA  08/23/23, 09:08 EDT

## 2023-09-13 ENCOUNTER — OFFICE VISIT (OUTPATIENT)
Dept: FAMILY MEDICINE CLINIC | Facility: CLINIC | Age: 63
End: 2023-09-13
Payer: COMMERCIAL

## 2023-09-13 VITALS
BODY MASS INDEX: 31.08 KG/M2 | SYSTOLIC BLOOD PRESSURE: 128 MMHG | HEIGHT: 67 IN | WEIGHT: 198 LBS | TEMPERATURE: 98.4 F | OXYGEN SATURATION: 98 % | HEART RATE: 88 BPM | RESPIRATION RATE: 16 BRPM | DIASTOLIC BLOOD PRESSURE: 76 MMHG

## 2023-09-13 DIAGNOSIS — H65.01 RIGHT ACUTE SEROUS OTITIS MEDIA, RECURRENCE NOT SPECIFIED: ICD-10-CM

## 2023-09-13 DIAGNOSIS — J06.9 URI, ACUTE: Primary | ICD-10-CM

## 2023-09-13 DIAGNOSIS — R11.2 NAUSEA AND VOMITING, UNSPECIFIED VOMITING TYPE: ICD-10-CM

## 2023-09-13 DIAGNOSIS — K21.9 GASTROESOPHAGEAL REFLUX DISEASE, UNSPECIFIED WHETHER ESOPHAGITIS PRESENT: ICD-10-CM

## 2023-09-13 DIAGNOSIS — R05.9 COUGH, UNSPECIFIED TYPE: ICD-10-CM

## 2023-09-13 LAB
EXPIRATION DATE: NORMAL
FLUAV AG UPPER RESP QL IA.RAPID: NOT DETECTED
FLUBV AG UPPER RESP QL IA.RAPID: NOT DETECTED
INTERNAL CONTROL: NORMAL
Lab: NORMAL
SARS-COV-2 AG UPPER RESP QL IA.RAPID: NOT DETECTED

## 2023-09-13 PROCEDURE — 87428 SARSCOV & INF VIR A&B AG IA: CPT | Performed by: NURSE PRACTITIONER

## 2023-09-13 PROCEDURE — 99213 OFFICE O/P EST LOW 20 MIN: CPT | Performed by: NURSE PRACTITIONER

## 2023-09-13 RX ORDER — BENZONATATE 100 MG/1
100 CAPSULE ORAL 3 TIMES DAILY PRN
Qty: 30 CAPSULE | Refills: 0 | Status: ON HOLD | OUTPATIENT
Start: 2023-09-13 | End: 2023-09-18

## 2023-09-13 RX ORDER — ONDANSETRON 4 MG/1
4 TABLET, FILM COATED ORAL EVERY 8 HOURS PRN
Qty: 12 TABLET | Refills: 0 | Status: ON HOLD | OUTPATIENT
Start: 2023-09-13

## 2023-09-13 RX ORDER — ESOMEPRAZOLE MAGNESIUM 40 MG/1
40 CAPSULE, DELAYED RELEASE ORAL
Qty: 90 CAPSULE | Refills: 1 | Status: ON HOLD | OUTPATIENT
Start: 2023-09-13

## 2023-09-13 RX ORDER — EMPAGLIFLOZIN 25 MG/1
TABLET, FILM COATED ORAL DAILY
COMMUNITY
Start: 2023-08-16 | End: 2023-09-14

## 2023-09-13 RX ORDER — AZELASTINE 1 MG/ML
1 SPRAY, METERED NASAL 2 TIMES DAILY
Qty: 1 EACH | Refills: 0 | Status: ON HOLD | OUTPATIENT
Start: 2023-09-13 | End: 2023-09-18

## 2023-09-13 NOTE — PROGRESS NOTES
Subjective     Sadiq Aldana is a 62 y.o.. male.     Nausea  Associated symptoms include abdominal pain, congestion, coughing, fatigue, headaches, nausea (after eating meat), a sore throat and vomiting. Pertinent negatives include no anorexia, arthralgias, fever or myalgias.   Cough  Associated symptoms include headaches, postnasal drip, rhinorrhea and a sore throat. Pertinent negatives include no ear pain, fever, myalgias, shortness of breath or weight loss.   Headache  Abdominal Pain  This is a new problem. The current episode started more than 1 month ago. The onset quality is sudden. The problem occurs intermittently. The problem has been gradually worsening. The pain is located in the generalized abdominal region. The pain is at a severity of 4/10. The quality of the pain is burning. The abdominal pain radiates to the right shoulder. Associated symptoms include belching, headaches, nausea (after eating meat) and vomiting. Pertinent negatives include no anorexia, arthralgias, constipation, diarrhea, dysuria, fever, flatus, frequency, hematochezia, hematuria, melena, myalgias or weight loss. The pain is aggravated by eating. The pain is relieved by Vomiting.   URI   This is a new problem. Episode onset: 2-3 days. The problem has been unchanged. Associated symptoms include abdominal pain, congestion, coughing, headaches, nausea (after eating meat), rhinorrhea, a sore throat and vomiting. Pertinent negatives include no diarrhea, dysuria or ear pain.     The following portions of the patient's history were reviewed and updated as appropriate: allergies, current medications, past family history, past medical history, past social history, past surgical history and problem list.    Past Medical History:   Diagnosis Date    Cholelithiasis     Removed    Depression 6/22    Cwife    Diabetes mellitus     Erectile dysfunction     GERD (gastroesophageal reflux disease)     Hyperlipidemia     Hypertension     Obstructive  sleep apnea syndrome     Peptic ulceration        Past Surgical History:   Procedure Laterality Date    CARDIAC CATHETERIZATION N/A 01/11/2023    Procedure: Left Heart Cath;  Surgeon: Jacque Camp MD;  Location:  [facility];  Service: Cardiology;  Laterality: N/A;    CARDIAC CATHETERIZATION N/A 01/11/2023    Procedure: Coronary angiography;  Surgeon: Jacque Camp MD;  Location:  [facility];  Service: Cardiology;  Laterality: N/A;    CARDIAC CATHETERIZATION N/A 01/11/2023    Procedure: Left ventriculography;  Surgeon: Jacque Camp MD;  Location:  [facility];  Service: Cardiology;  Laterality: N/A;    CARDIAC CATHETERIZATION N/A 01/11/2023    Procedure: Percutaneous Coronary Intervention;  Surgeon: Jacque Camp MD;  Location:  [facility];  Service: Cardiology;  Laterality: N/A;    CARDIAC CATHETERIZATION N/A 01/11/2023    Procedure: Stent EUGENIO coronary;  Surgeon: Jacque Camp MD;  Location: [facility];  Service: Cardiology;  Laterality: N/A;    CHOLECYSTECTOMY      CORONARY STENT PLACEMENT  1/112023    FRACTURE SURGERY      SHOULDER ARTHROSCOPY Bilateral     removal of bone spurs       Review of Systems   Constitutional:  Positive for fatigue. Negative for fever and weight loss.   HENT:  Positive for congestion, postnasal drip, rhinorrhea and sore throat. Negative for ear pain.    Respiratory:  Positive for cough. Negative for shortness of breath.    Cardiovascular: Negative.    Gastrointestinal:  Positive for abdominal pain, nausea (after eating meat) and vomiting. Negative for anorexia, constipation, diarrhea, flatus, hematochezia and melena.        Burping, acid reflux   Genitourinary:  Negative for dysuria, frequency and hematuria.   Musculoskeletal:  Negative for arthralgias and myalgias.   Neurological:  Positive for headaches.     No Known Allergies    Objective     Vitals:    09/13/23 1526   BP: 128/76  "  Pulse: 88   Resp: 16   Temp: 98.4 °F (36.9 °C)   SpO2: 98%   Weight: 89.8 kg (198 lb)   Height: 170.2 cm (67.01\")     Body mass index is 31 kg/m².    Physical Exam  Vitals reviewed.   Constitutional:       Appearance: He is well-developed.   HENT:      Head: Normocephalic and atraumatic.      Right Ear: Tympanic membrane normal. A middle ear effusion (clear fluid) is present. Tympanic membrane is not erythematous.      Left Ear: Tympanic membrane normal. Tympanic membrane is not erythematous.      Nose: Nose normal. Congestion present.      Mouth/Throat:      Mouth: Mucous membranes are moist.      Pharynx: Oropharyngeal exudate (clear fluid) present. No pharyngeal swelling or posterior oropharyngeal erythema.   Eyes:      Conjunctiva/sclera: Conjunctivae normal.      Pupils: Pupils are equal, round, and reactive to light.   Cardiovascular:      Rate and Rhythm: Normal rate and regular rhythm.      Heart sounds: No murmur heard.  Pulmonary:      Effort: Pulmonary effort is normal.      Breath sounds: No wheezing, rhonchi or rales.   Musculoskeletal:         General: Normal range of motion.   Lymphadenopathy:      Cervical: No cervical adenopathy.   Skin:     General: Skin is warm and dry.   Neurological:      Mental Status: He is alert and oriented to person, place, and time.         Current Outpatient Medications:     aspirin 81 MG EC tablet, Take 1 tablet by mouth Daily., Disp: 90 tablet, Rfl: 4    atorvastatin (Lipitor) 40 MG tablet, Take 1 tablet by mouth Daily., Disp: 30 tablet, Rfl: 5    cholecalciferol (VITAMIN D3) 10 MCG (400 UNIT) tablet, Take 1 tablet by mouth Daily., Disp: 90 tablet, Rfl: 4    Continuous Blood Gluc Sensor (FreeStyle Eulalio 14 Day Sensor) misc, 1 Units 3 (Three) Times a Day., Disp: 9 each, Rfl: 9    lisinopril (PRINIVIL,ZESTRIL) 10 MG tablet, Take 1 tablet by mouth Daily., Disp: 30 tablet, Rfl: 5    metoprolol tartrate (LOPRESSOR) 25 MG tablet, Take 0.5 tablets by mouth Every 12 (Twelve) " Hours., Disp: 30 tablet, Rfl: 5    prasugrel (EFFIENT) 10 MG tablet, Take 1 tablet by mouth Daily., Disp: 30 tablet, Rfl: 11    Semaglutide (Rybelsus) 7 MG tablet, Take 7 mg by mouth Daily., Disp: 30 tablet, Rfl: 5    sildenafil (VIAGRA) 25 MG tablet, TAKE TWO TABLETS BY MOUTH DAILY AS NEEDED FOR ERECTILE DYSFUNCTION, Disp: 90 tablet, Rfl: 0    Tresiba FlexTouch 100 UNIT/ML solution pen-injector injection, Inject 32 Units under the skin into the appropriate area as directed Every Night. Doing in am now, Disp: 200 mL, Rfl: 5    azelastine (ASTELIN) 0.1 % nasal spray, 1 spray into the nostril(s) as directed by provider 2 (Two) Times a Day for 14 days. Use in each nostril as directed, Disp: 1 each, Rfl: 0    benzonatate (Tessalon Perles) 100 MG capsule, Take 1 capsule by mouth 3 (Three) Times a Day As Needed for Cough., Disp: 30 capsule, Rfl: 0    Empagliflozin-metFORMIN HCl ER (Synjardy XR) 12.5-1000 MG tablet sustained-release 24 hour, Take 1 tablet by mouth 2 (Two) Times a Day., Disp: , Rfl:     esomeprazole (nexIUM) 40 MG capsule, Take 1 capsule by mouth Every Morning Before Breakfast., Disp: 90 capsule, Rfl: 1    fluticasone (FLONASE) 50 MCG/ACT nasal spray, 2 sprays into the nostril(s) as directed by provider Daily for 14 days., Disp: 16 g, Rfl: 0    ondansetron (Zofran) 4 MG tablet, Take 1 tablet by mouth Every 8 (Eight) Hours As Needed for Nausea or Vomiting., Disp: 12 tablet, Rfl: 0    Recent Results (from the past 168 hour(s))   POCT SARS-CoV-2 Antigen KAI    Collection Time: 09/13/23  3:40 PM    Specimen: Swab   Result Value Ref Range    SARS Antigen Not Detected Not Detected, Presumptive Negative    Influenza A Antigen KAI Not Detected Not Detected    Influenza B Antigen KAI Not Detected Not Detected    Internal Control Passed Passed    Lot Number [id number]     Expiration Date 04-        Adult Transthoracic Echo Complete w/ Color, Spectral and Contrast if Necessary Per Protocol    Left ventricular  systolic function is normal. Calculated left   ventricular EF = 60.7% Normal left ventricular cavity size and wall   thickness noted. All left ventricular wall segments contract normally.   Left ventricular diastolic function is consistent with (grade I) impaired   relaxation.    Trace tricuspid valve regurgitation is present. Estimated right   ventricular systolic pressure from tricuspid regurgitation is normal (<35   mmHg).        Diagnoses and all orders for this visit:    1. URI, acute (Primary)    2. Right acute serous otitis media, recurrence not specified  -     azelastine (ASTELIN) 0.1 % nasal spray; 1 spray into the nostril(s) as directed by provider 2 (Two) Times a Day for 14 days. Use in each nostril as directed  Dispense: 1 each; Refill: 0    3. Cough, unspecified type  -     POCT SARS-CoV-2 Antigen KAI  -     benzonatate (Tessalon Perles) 100 MG capsule; Take 1 capsule by mouth 3 (Three) Times a Day As Needed for Cough.  Dispense: 30 capsule; Refill: 0    4. Nausea and vomiting, unspecified vomiting type  Comments:  after eating meat  Orders:  -     Cancel: Alpha-Gal, IgE, Serum; Future  -     Alpha-Gal, IgE, Serum  -     ondansetron (Zofran) 4 MG tablet; Take 1 tablet by mouth Every 8 (Eight) Hours As Needed for Nausea or Vomiting.  Dispense: 12 tablet; Refill: 0    5. Gastroesophageal reflux disease, unspecified whether esophagitis present  -     esomeprazole (nexIUM) 40 MG capsule; Take 1 capsule by mouth Every Morning Before Breakfast.  Dispense: 90 capsule; Refill: 1        Patient Instructions   Drink plenty of fluids-water preferably, eat a heart healthy diet, get plenty of sleep and do warm salt water gargles twice a day until feeling better    Return if symptoms worsen or fail to improve, for fasting labs in 1 month, recheck in 1 month.

## 2023-09-14 RX ORDER — EMPAGLIFLOZIN, METFORMIN HYDROCHLORIDE 12.5; 1 MG/1; MG/1
1 TABLET, EXTENDED RELEASE ORAL
Status: ON HOLD | COMMUNITY

## 2023-09-14 NOTE — PATIENT INSTRUCTIONS
Drink plenty of fluids-water preferably, eat a heart healthy diet, get plenty of sleep and do warm salt water gargles twice a day until feeling better

## 2023-09-18 ENCOUNTER — APPOINTMENT (OUTPATIENT)
Dept: GENERAL RADIOLOGY | Facility: HOSPITAL | Age: 63
DRG: 247 | End: 2023-09-18
Payer: COMMERCIAL

## 2023-09-18 ENCOUNTER — HOSPITAL ENCOUNTER (INPATIENT)
Facility: HOSPITAL | Age: 63
LOS: 1 days | Discharge: HOME | DRG: 247 | End: 2023-09-20
Attending: EMERGENCY MEDICINE | Admitting: STUDENT IN AN ORGANIZED HEALTH CARE EDUCATION/TRAINING PROGRAM
Payer: COMMERCIAL

## 2023-09-18 DIAGNOSIS — I20.0 UNSTABLE ANGINA: ICD-10-CM

## 2023-09-18 DIAGNOSIS — I10 HYPERTENSION, UNSPECIFIED TYPE: ICD-10-CM

## 2023-09-18 DIAGNOSIS — R07.9 CHEST PAIN, UNSPECIFIED TYPE: Primary | ICD-10-CM

## 2023-09-18 DIAGNOSIS — R73.9 HYPERGLYCEMIA: ICD-10-CM

## 2023-09-18 LAB
ALBUMIN SERPL-MCNC: 3.8 G/DL (ref 3.5–5.2)
ALBUMIN/GLOB SERPL: 2 G/DL
ALP SERPL-CCNC: 70 U/L (ref 39–117)
ALT SERPL W P-5'-P-CCNC: 14 U/L (ref 1–41)
ANION GAP SERPL CALCULATED.3IONS-SCNC: 14 MMOL/L (ref 5–15)
APTT PPP: 26.3 SECONDS (ref 22.7–35.4)
AST SERPL-CCNC: 17 U/L (ref 1–40)
BASOPHILS # BLD AUTO: 0.05 10*3/MM3 (ref 0–0.2)
BASOPHILS NFR BLD AUTO: 0.8 % (ref 0–1.5)
BILIRUB SERPL-MCNC: 0.3 MG/DL (ref 0–1.2)
BUN SERPL-MCNC: 18 MG/DL (ref 8–23)
BUN/CREAT SERPL: 15.9 (ref 7–25)
CALCIUM SPEC-SCNC: 8.9 MG/DL (ref 8.6–10.5)
CHLORIDE SERPL-SCNC: 102 MMOL/L (ref 98–107)
CHOLEST SERPL-MCNC: 133 MG/DL (ref 0–200)
CO2 SERPL-SCNC: 21 MMOL/L (ref 22–29)
CREAT SERPL-MCNC: 1.13 MG/DL (ref 0.76–1.27)
DEPRECATED RDW RBC AUTO: 41 FL (ref 37–54)
EGFRCR SERPLBLD CKD-EPI 2021: 73.5 ML/MIN/1.73
EOSINOPHIL # BLD AUTO: 0.08 10*3/MM3 (ref 0–0.4)
EOSINOPHIL NFR BLD AUTO: 1.3 % (ref 0.3–6.2)
ERYTHROCYTE [DISTWIDTH] IN BLOOD BY AUTOMATED COUNT: 12.6 % (ref 12.3–15.4)
GEN 5 2HR TROPONIN T REFLEX: 47 NG/L
GLOBULIN UR ELPH-MCNC: 1.9 GM/DL
GLUCOSE BLDC GLUCOMTR-MCNC: 150 MG/DL (ref 70–130)
GLUCOSE BLDC GLUCOMTR-MCNC: 269 MG/DL (ref 70–130)
GLUCOSE SERPL-MCNC: 264 MG/DL (ref 65–99)
HCT VFR BLD AUTO: 40 % (ref 37.5–51)
HDLC SERPL-MCNC: 43 MG/DL (ref 40–60)
HGB BLD-MCNC: 13.6 G/DL (ref 13–17.7)
HOLD SPECIMEN: NORMAL
HOLD SPECIMEN: NORMAL
IMM GRANULOCYTES # BLD AUTO: 0.02 10*3/MM3 (ref 0–0.05)
IMM GRANULOCYTES NFR BLD AUTO: 0.3 % (ref 0–0.5)
INR PPP: 0.97 (ref 0.9–1.1)
LDLC SERPL CALC-MCNC: 66 MG/DL (ref 0–100)
LDLC/HDLC SERPL: 1.47 {RATIO}
LYMPHOCYTES # BLD AUTO: 1.22 10*3/MM3 (ref 0.7–3.1)
LYMPHOCYTES NFR BLD AUTO: 19.3 % (ref 19.6–45.3)
MCH RBC QN AUTO: 30.3 PG (ref 26.6–33)
MCHC RBC AUTO-ENTMCNC: 34 G/DL (ref 31.5–35.7)
MCV RBC AUTO: 89.1 FL (ref 79–97)
MONOCYTES # BLD AUTO: 0.57 10*3/MM3 (ref 0.1–0.9)
MONOCYTES NFR BLD AUTO: 9 % (ref 5–12)
NEUTROPHILS NFR BLD AUTO: 4.38 10*3/MM3 (ref 1.7–7)
NEUTROPHILS NFR BLD AUTO: 69.3 % (ref 42.7–76)
NRBC BLD AUTO-RTO: 0 /100 WBC (ref 0–0.2)
PLATELET # BLD AUTO: 287 10*3/MM3 (ref 140–450)
PMV BLD AUTO: 9.3 FL (ref 6–12)
POTASSIUM SERPL-SCNC: 4.4 MMOL/L (ref 3.5–5.2)
PROT SERPL-MCNC: 5.7 G/DL (ref 6–8.5)
PROTHROMBIN TIME: 13 SECONDS (ref 11.7–14.2)
QT INTERVAL: 522 MS
QTC INTERVAL: 595 MS
RBC # BLD AUTO: 4.49 10*6/MM3 (ref 4.14–5.8)
SODIUM SERPL-SCNC: 137 MMOL/L (ref 136–145)
TRIGL SERPL-MCNC: 134 MG/DL (ref 0–150)
TROPONIN T DELTA: 2 NG/L
TROPONIN T SERPL HS-MCNC: 45 NG/L
VLDLC SERPL-MCNC: 24 MG/DL (ref 5–40)
WBC NRBC COR # BLD: 6.32 10*3/MM3 (ref 3.4–10.8)
WHOLE BLOOD HOLD COAG: NORMAL
WHOLE BLOOD HOLD SPECIMEN: NORMAL

## 2023-09-18 PROCEDURE — G0378 HOSPITAL OBSERVATION PER HR: HCPCS

## 2023-09-18 PROCEDURE — 82948 REAGENT STRIP/BLOOD GLUCOSE: CPT

## 2023-09-18 PROCEDURE — 84484 ASSAY OF TROPONIN QUANT: CPT | Performed by: EMERGENCY MEDICINE

## 2023-09-18 PROCEDURE — 93010 ELECTROCARDIOGRAM REPORT: CPT | Performed by: INTERNAL MEDICINE

## 2023-09-18 PROCEDURE — 99285 EMERGENCY DEPT VISIT HI MDM: CPT

## 2023-09-18 PROCEDURE — 71045 X-RAY EXAM CHEST 1 VIEW: CPT

## 2023-09-18 PROCEDURE — 85730 THROMBOPLASTIN TIME PARTIAL: CPT | Performed by: EMERGENCY MEDICINE

## 2023-09-18 PROCEDURE — 36415 COLL VENOUS BLD VENIPUNCTURE: CPT

## 2023-09-18 PROCEDURE — 80061 LIPID PANEL: CPT | Performed by: NURSE PRACTITIONER

## 2023-09-18 PROCEDURE — 93005 ELECTROCARDIOGRAM TRACING: CPT | Performed by: EMERGENCY MEDICINE

## 2023-09-18 PROCEDURE — 63710000001 INSULIN LISPRO (HUMAN) PER 5 UNITS: Performed by: NURSE PRACTITIONER

## 2023-09-18 PROCEDURE — 93005 ELECTROCARDIOGRAM TRACING: CPT

## 2023-09-18 PROCEDURE — 80053 COMPREHEN METABOLIC PANEL: CPT | Performed by: EMERGENCY MEDICINE

## 2023-09-18 PROCEDURE — 85610 PROTHROMBIN TIME: CPT | Performed by: EMERGENCY MEDICINE

## 2023-09-18 PROCEDURE — 85025 COMPLETE CBC W/AUTO DIFF WBC: CPT | Performed by: EMERGENCY MEDICINE

## 2023-09-18 RX ORDER — ATORVASTATIN CALCIUM 20 MG/1
40 TABLET, FILM COATED ORAL DAILY
Status: DISCONTINUED | OUTPATIENT
Start: 2023-09-18 | End: 2023-09-20 | Stop reason: HOSPADM

## 2023-09-18 RX ORDER — ACETAMINOPHEN 325 MG/1
650 TABLET ORAL EVERY 6 HOURS PRN
Status: DISCONTINUED | OUTPATIENT
Start: 2023-09-18 | End: 2023-09-20 | Stop reason: HOSPADM

## 2023-09-18 RX ORDER — NITROGLYCERIN 0.4 MG/1
0.4 TABLET SUBLINGUAL
Status: DISCONTINUED | OUTPATIENT
Start: 2023-09-18 | End: 2023-09-20 | Stop reason: HOSPADM

## 2023-09-18 RX ORDER — IBUPROFEN 600 MG/1
1 TABLET ORAL
Status: DISCONTINUED | OUTPATIENT
Start: 2023-09-18 | End: 2023-09-20 | Stop reason: HOSPADM

## 2023-09-18 RX ORDER — SODIUM CHLORIDE 9 MG/ML
40 INJECTION, SOLUTION INTRAVENOUS AS NEEDED
Status: DISCONTINUED | OUTPATIENT
Start: 2023-09-18 | End: 2023-09-20 | Stop reason: HOSPADM

## 2023-09-18 RX ORDER — NICOTINE POLACRILEX 4 MG
15 LOZENGE BUCCAL
Status: DISCONTINUED | OUTPATIENT
Start: 2023-09-18 | End: 2023-09-20 | Stop reason: HOSPADM

## 2023-09-18 RX ORDER — SODIUM CHLORIDE 0.9 % (FLUSH) 0.9 %
10 SYRINGE (ML) INJECTION EVERY 12 HOURS SCHEDULED
Status: DISCONTINUED | OUTPATIENT
Start: 2023-09-18 | End: 2023-09-20 | Stop reason: HOSPADM

## 2023-09-18 RX ORDER — POLYETHYLENE GLYCOL 3350 17 G/17G
17 POWDER, FOR SOLUTION ORAL DAILY PRN
Status: DISCONTINUED | OUTPATIENT
Start: 2023-09-18 | End: 2023-09-20 | Stop reason: HOSPADM

## 2023-09-18 RX ORDER — BISACODYL 5 MG/1
5 TABLET, DELAYED RELEASE ORAL DAILY PRN
Status: DISCONTINUED | OUTPATIENT
Start: 2023-09-18 | End: 2023-09-20 | Stop reason: HOSPADM

## 2023-09-18 RX ORDER — INSULIN LISPRO 100 [IU]/ML
2-9 INJECTION, SOLUTION INTRAVENOUS; SUBCUTANEOUS
Status: DISCONTINUED | OUTPATIENT
Start: 2023-09-18 | End: 2023-09-20 | Stop reason: HOSPADM

## 2023-09-18 RX ORDER — ASPIRIN 81 MG/1
81 TABLET ORAL DAILY
Status: DISCONTINUED | OUTPATIENT
Start: 2023-09-19 | End: 2023-09-20 | Stop reason: HOSPADM

## 2023-09-18 RX ORDER — SODIUM CHLORIDE 0.9 % (FLUSH) 0.9 %
10 SYRINGE (ML) INJECTION AS NEEDED
Status: DISCONTINUED | OUTPATIENT
Start: 2023-09-18 | End: 2023-09-20 | Stop reason: HOSPADM

## 2023-09-18 RX ORDER — BISACODYL 10 MG
10 SUPPOSITORY, RECTAL RECTAL DAILY PRN
Status: DISCONTINUED | OUTPATIENT
Start: 2023-09-18 | End: 2023-09-20 | Stop reason: HOSPADM

## 2023-09-18 RX ORDER — PANTOPRAZOLE SODIUM 40 MG/1
40 TABLET, DELAYED RELEASE ORAL
Status: DISCONTINUED | OUTPATIENT
Start: 2023-09-19 | End: 2023-09-20 | Stop reason: HOSPADM

## 2023-09-18 RX ORDER — AMOXICILLIN 250 MG
2 CAPSULE ORAL 2 TIMES DAILY
Status: DISCONTINUED | OUTPATIENT
Start: 2023-09-18 | End: 2023-09-20 | Stop reason: HOSPADM

## 2023-09-18 RX ORDER — PRASUGREL 10 MG/1
10 TABLET, FILM COATED ORAL DAILY
Status: DISCONTINUED | OUTPATIENT
Start: 2023-09-19 | End: 2023-09-20 | Stop reason: HOSPADM

## 2023-09-18 RX ORDER — DEXTROSE MONOHYDRATE 25 G/50ML
25 INJECTION, SOLUTION INTRAVENOUS
Status: DISCONTINUED | OUTPATIENT
Start: 2023-09-18 | End: 2023-09-20 | Stop reason: HOSPADM

## 2023-09-18 RX ORDER — ASPIRIN 81 MG/1
324 TABLET, CHEWABLE ORAL ONCE
Status: DISCONTINUED | OUTPATIENT
Start: 2023-09-18 | End: 2023-09-20 | Stop reason: HOSPADM

## 2023-09-18 RX ORDER — LISINOPRIL 10 MG/1
10 TABLET ORAL DAILY
Status: DISCONTINUED | OUTPATIENT
Start: 2023-09-19 | End: 2023-09-20 | Stop reason: HOSPADM

## 2023-09-18 RX ORDER — ASPIRIN 325 MG
325 TABLET ORAL ONCE
Status: COMPLETED | OUTPATIENT
Start: 2023-09-18 | End: 2023-09-18

## 2023-09-18 RX ADMIN — NITROGLYCERIN 1 INCH: 20 OINTMENT TOPICAL at 16:24

## 2023-09-18 RX ADMIN — Medication 10 ML: at 20:33

## 2023-09-18 RX ADMIN — ATORVASTATIN CALCIUM 40 MG: 20 TABLET, FILM COATED ORAL at 20:32

## 2023-09-18 RX ADMIN — ACETAMINOPHEN 650 MG: 325 TABLET ORAL at 20:37

## 2023-09-18 RX ADMIN — ASPIRIN 325 MG: 325 TABLET ORAL at 15:55

## 2023-09-18 RX ADMIN — INSULIN LISPRO 6 UNITS: 100 INJECTION, SOLUTION INTRAVENOUS; SUBCUTANEOUS at 20:32

## 2023-09-18 NOTE — ED NOTES
Nursing report ED to floor  Sadiq Aldana  62 y.o.  male    HPI :   Chief Complaint   Patient presents with    Chest Pain       Admitting doctor:   Xander Barros MD    Admitting diagnosis:   There were no encounter diagnoses.    Code status:   Current Code Status       Date Active Code Status Order ID Comments User Context       Prior            Allergies:   Patient has no known allergies.    Isolation:   No active isolations    Intake and Output  No intake or output data in the 24 hours ending 09/18/23 1631    Weight:   There were no vitals filed for this visit.    Most recent vitals:   Vitals:    09/18/23 1451 09/18/23 1456 09/18/23 1538 09/18/23 1624   BP:  180/87 170/78 140/93   Pulse: 80  90 87   Resp: 16      Temp: 97.8 °F (36.6 °C)      SpO2: 98%  97%        Active LDAs/IV Access:   Lines, Drains & Airways       Active LDAs       Name Placement date Placement time Site Days    Peripheral IV 09/18/23 1545 Distal;Posterior;Right Forearm 09/18/23  1545  Forearm  less than 1                    Labs (abnormal labs have a star):   Labs Reviewed   COMPREHENSIVE METABOLIC PANEL - Abnormal; Notable for the following components:       Result Value    Glucose 264 (*)     CO2 21.0 (*)     Total Protein 5.7 (*)     All other components within normal limits    Narrative:     GFR Normal >60  Chronic Kidney Disease <60  Kidney Failure <15     TROPONIN - Abnormal; Notable for the following components:    HS Troponin T 45 (*)     All other components within normal limits    Narrative:     High Sensitive Troponin T Reference Range:  <10.0 ng/L- Negative Female for AMI  <15.0 ng/L- Negative Male for AMI  >=10 - Abnormal Female indicating possible myocardial injury.  >=15 - Abnormal Male indicating possible myocardial injury.   Clinicians would have to utilize clinical acumen, EKG, Troponin, and serial changes to determine if it is an Acute Myocardial Infarction or myocardial injury due to an underlying chronic condition.         CBC WITH AUTO DIFFERENTIAL - Abnormal; Notable for the following components:    Lymphocyte % 19.3 (*)     All other components within normal limits   PROTIME-INR - Normal   APTT - Normal   RAINBOW DRAW    Narrative:     The following orders were created for panel order [place] Draw.  Procedure                               Abnormality         Status                     ---------                               -----------         ------                     Green Top (Gel)[[id number]]                                  In process                 Lavender Top[[id number]]                                     In process                 Gold Top - SST[[id number]]                                   In process                 Light Blue Top[[id number]]                                   In process                   Please view results for these tests on the individual orders.   HIGH SENSITIVITIY TROPONIN T 2HR   LIPID PANEL   CBC AND DIFFERENTIAL    Narrative:     The following orders were created for panel order CBC & Differential.  Procedure                               Abnormality         Status                     ---------                               -----------         ------                     CBC Auto Differential[[id number]]        Abnormal            Final result                 Please view results for these tests on the individual orders.   GREEN TOP   LAVENDER TOP   GOLD TOP - SST   LIGHT BLUE TOP       EKG:   ECG 12 Lead Chest Pain   Final Result   HEART RATE= 78  bpm   RR Interval= 769  ms   WA Interval= 155  ms   P Horizontal Axis= -1  deg   P Front Axis= 68  deg   QRSD Interval= 83  ms   QT Interval= 522  ms   QTcB= 595  ms   QRS Axis= 7  deg   T Wave Axis= 44  deg   - ABNORMAL ECG -   Sinus rhythm   Borderline T abnormalities, anterior leads   Prolonged QT interval - new   Electronically Signed By: Jacque Camp ([facility]) 18-Sep-2023 15:41:46   Date and Time of Study: 2023-09-18 14:54:49      ECG 12 Lead Chest  Pain    (Results Pending)   ECG 12 Lead Chest Pain    (Results Pending)       Meds given in ED:   Medications   sodium chloride 0.9 % flush 10 mL (has no administration in time range)   nitroglycerin (NITROSTAT) SL tablet 0.4 mg (has no administration in time range)   sodium chloride 0.9 % flush 10 mL (has no administration in time range)   sodium chloride 0.9 % flush 10 mL (has no administration in time range)   sodium chloride 0.9 % infusion 40 mL (has no administration in time range)   aspirin chewable tablet 324 mg (has no administration in time range)     And   aspirin EC tablet 81 mg (has no administration in time range)   sennosides-docusate (PERICOLACE) 8.6-50 MG per tablet 2 tablet (has no administration in time range)     And   polyethylene glycol (MIRALAX) packet 17 g (has no administration in time range)     And   bisacodyl (DULCOLAX) EC tablet 5 mg (has no administration in time range)     And   bisacodyl (DULCOLAX) suppository 10 mg (has no administration in time range)   aspirin tablet 325 mg (325 mg Oral Given 9/18/23 1555)   nitroglycerin (NITROSTAT) ointment 1 inch (1 inch Topical Given 9/18/23 1624)       Imaging results:  XR Chest 1 View    Result Date: 9/18/2023  No acute findings    This report was finalized on 9/18/2023 3:43 PM by Dr. Parveen Avalos M.D.       Ambulatory status:   - supervision    Social issues:   Social History     Socioeconomic History    Marital status:    Tobacco Use    Smoking status: Never    Smokeless tobacco: Never   Vaping Use    Vaping Use: Never used   Substance and Sexual Activity    Alcohol use: Not Currently     Comment: Less than 2 a month    Drug use: Never    Sexual activity: Yes     Partners: Female     Birth control/protection: Condom, Pill, None       NIH Stroke Scale:       Santi Agrawal RN  09/18/23 16:31 EDT

## 2023-09-18 NOTE — Clinical Note
Hemostasis started on the right radial artery. Radial compression device applied to vessel. Hemostasis achieved successfully. Closure device additional comment: TR band 12cc of air

## 2023-09-18 NOTE — LETTER
[facility] CASE [facility]  [address] [place] [facility] [address]  [phone]        September 20, 2023      Patient: Sadiq Aldana  YOB: 1960  Date of Visit: 9/18/2023    ATTENTION INPATIENT AUTHORIZATION REQUEST   MEMBER ID [id number], AUTH REQUEST FORM MAY  APPEAR AT END OF THIS FAX , REPLY TO UR DEPT FAX  [phone] OR CALL           Emma Alexandra LPN

## 2023-09-18 NOTE — H&P
". [facility]   HISTORY AND PHYSICAL    Patient Name: Sadiq Aldana  : 1960  MRN: [id number]  Primary Care Physician:  Ofelia Hernandez APRN  Date of admission: 2023    Subjective   Subjective     Chief Complaint: Chest pain    HPI:    Sadiq Aldana is a 62 y.o. male with past medical history including but limited to hypertension, hyperlipidemia, DM 2, GERD, depression, MIGUEL, and CAD s/p stent placement presents to [facility] with midsternal chest pain for the past 3 days.  Patient reports he had intermittent chest pain on Friday that he describes as\" burning sensation\" that radiates into his shoulder and bilateral upper extremities.  Reports symptoms worsened with physical exertion, improved with rest.  Reports for the past 3 days his chest discomfort has progressively worsened and increased in frequency and severity.  Denies any associated nausea, vomiting, diaphoresis, back pain or shortness of breath.  Currently patient rates his pain 3/10.  States he follows with Dr. Parker last cardiac catheterization was in 2023 without any stent placement.  Patient denies palpitation or shortness of breath.  Denies abdominal pain, nausea, vomit, diarrhea.  Denies cough, fever, chills, or lower extremity edema.  Denies dysuria, hematuria, or urinary frequency/urgency.    Laboratory evaluation in the ED shows high-sensitivity troponin 45, 47 with a delta of 2.  CBC and CMP relatively unremarkable.  EKG shows sinus rhythm without any ischemia.    Echo on 2023 shows EF 67% with a grade 1 LV systolic function.    Review of Systems   All systems were reviewed and negative except for: That mentioned above in HPI    Personal History     Past Medical History:   Diagnosis Date    Cholelithiasis     Removed    Depression     Cwife    Diabetes mellitus     Erectile dysfunction     GERD (gastroesophageal reflux disease)     Hyperlipidemia     Hypertension     Obstructive sleep apnea " syndrome     Peptic ulceration        Past Surgical History:   Procedure Laterality Date    CARDIAC CATHETERIZATION N/A 01/11/2023    Procedure: Left Heart Cath;  Surgeon: Jacque Camp MD;  Location:  [facility];  Service: Cardiology;  Laterality: N/A;    CARDIAC CATHETERIZATION N/A 01/11/2023    Procedure: Coronary angiography;  Surgeon: Jacque Camp MD;  Location:  [facility];  Service: Cardiology;  Laterality: N/A;    CARDIAC CATHETERIZATION N/A 01/11/2023    Procedure: Left ventriculography;  Surgeon: Jacque Camp MD;  Location:  [facility];  Service: Cardiology;  Laterality: N/A;    CARDIAC CATHETERIZATION N/A 01/11/2023    Procedure: Percutaneous Coronary Intervention;  Surgeon: Jacque Camp MD;  Location:  [facility];  Service: Cardiology;  Laterality: N/A;    CARDIAC CATHETERIZATION N/A 01/11/2023    Procedure: Stent EUGENIO coronary;  Surgeon: Jacque Camp MD;  [facility];  Service: Cardiology;  Laterality: N/A;    CHOLECYSTECTOMY      CORONARY STENT PLACEMENT  1/112023    FRACTURE SURGERY      SHOULDER ARTHROSCOPY Bilateral     removal of bone spurs       Family History: family history includes Cancer in his brother, mother, and sister; Heart disease in his mother; No Known Problems in his sister, sister, sister, and sister; Other in his father, sister, and sister. Otherwise pertinent FHx was reviewed and not pertinent to current issue.    Social History:  reports that he has never smoked. He has never used smokeless tobacco. He reports that he does not currently use alcohol. He reports that he does not use drugs.    Home Medications:  Empagliflozin-metFORMIN HCl ER, FreeStyle Eulalio 14 Day Sensor, Semaglutide, aspirin, atorvastatin, cholecalciferol, empagliflozin, esomeprazole, fluticasone, insulin degludec, lisinopril, metFORMIN, metoprolol tartrate, ondansetron, prasugrel, and  sildenafil    Allergies:  No Known Allergies    Objective   Objective     Vitals:   Temp:  [97.8 °F (36.6 °C)-98.6 °F (37 °C)] 98.6 °F (37 °C)  Heart Rate:  [80-92] 84  Resp:  [16-17] 16  BP: (138-180)/(68-93) 142/68  Physical Exam    Constitutional: Awake, alert   Eyes: PERRLA, sclerae anicteric, no conjunctival injection   HENT: NCAT, mucous membranes moist   Neck: Supple, no thyromegaly, no lymphadenopathy, trachea midline   Respiratory: Clear to auscultation bilaterally, nonlabored respirations    Cardiovascular: RRR, no murmurs, rubs, or gallops, palpable pedal pulses bilaterally   Gastrointestinal: Positive bowel sounds, soft, nontender, nondistended   Musculoskeletal: No bilateral ankle edema, no clubbing or cyanosis to extremities   Psychiatric: Appropriate affect, cooperative   Neurologic: Oriented x 3, strength symmetric in all extremities, Cranial Nerves grossly intact to confrontation, speech clear   Skin: No rashes     Result Review    Result Review:  I have personally reviewed the results from the time of this admission to 9/18/2023 19:39 EDT and agree with these findings:  []  Laboratory list / accordion  []  Microbiology  []  Radiology  []  EKG/Telemetry   []  Cardiology/Vascular   []  Pathology  []  Old records  []  Other:      Assessment & Plan   Assessment / Plan     Brief Patient Summary:  Sadiq Aldana is a 62 y.o. male who was seen and evaluated the ED for chest pain    Active Hospital Problems:  Active Hospital Problems    Diagnosis     **Chest pain      Plan:   CAD  Chest pain  -Initial troponin 45, repeat 47 with a delta of 2  -EKG nonischemic  -Chest x-ray negative for acute finding  -Cardiology consult  -N.p.o. after midnight  -Continue aspirin, statin and Effient    DM 2  -Accu-Chek before meals and at bedtime  -Insulin sliding scale    Hypertension  -Continue lisinopril   -Hold metoprolol for possible stress test in a.m.    DVT prophylaxis:  No DVT prophylaxis order currently  exists.    CODE STATUS:    Level Of Support Discussed With: Patient  Code Status (Patient has no pulse and is not breathing): CPR (Attempt to Resuscitate)  Medical Interventions (Patient has pulse or is breathing): Full Support    Admission Status:  I believe this patient meets observation status.    75 minutes has been spent by [facility] providers in the care of this patient while under observation status    .During patient visit, I utilized appropriate personal protective equipment including gloves. Appropriate PPE was worn during the entire visit.  Hand hygiene was completed before and after    Electronically signed by NEELA Van, 09/18/23, 7:39 PM EDT.

## 2023-09-18 NOTE — ED PROVIDER NOTES
EMERGENCY DEPARTMENT ENCOUNTER    Room Number:  32/32  Date seen:  9/18/2023  PCP: Ofelia Hernandez APRN  Historian(s): Patient      HPI:  Chief Complaint: Chest pain  A complete HPI/ROS/PMH/PSH/SH/FH are unobtainable / limited due to: None  Context: Sadiq Aldana is a 62 y.o. male who presents to the ED for evaluation of persistent chest pain since Friday last week.  Patient has a history of CAD status pos 1 coronary stent back in January of this year.  He says over the weekend he noticed that the pain was more persistent and becomes even worse whenever he is walking or doing any exertional activities.  The pain radiates from the central chest towards the left arm especially and causes him to feel somewhat short of breath.  He has been taking his usual medications as directed including his aspirin and metoprolol      PAST MEDICAL HISTORY  Active Ambulatory Problems     Diagnosis Date Noted    Type 2 diabetes mellitus without complication, with long-term current use of insulin 02/28/2020    Mixed hyperlipidemia 02/28/2020    Gastroesophageal reflux disease 02/28/2020    Acute left-sided low back pain with left-sided sciatica 06/03/2020    Routine adult health maintenance 01/22/2021    Artificial lens present 05/20/2021    Onychomycosis 06/02/2022    Hypertension 06/02/2022    Chest pain 01/10/2023    Unstable angina 01/10/2023    Chest pain, unspecified type 01/12/2023     Resolved Ambulatory Problems     Diagnosis Date Noted    No Resolved Ambulatory Problems     Past Medical History:   Diagnosis Date    Cholelithiasis     Depression 6/22    Diabetes mellitus     Erectile dysfunction     GERD (gastroesophageal reflux disease)     Hyperlipidemia     Obstructive sleep apnea syndrome     Peptic ulceration          PAST SURGICAL HISTORY  Past Surgical History:   Procedure Laterality Date    CARDIAC CATHETERIZATION N/A 01/11/2023    Procedure: Left Heart Cath;  Surgeon: Jacque Camp MD;  Location: [facility]  INVASIVE LOCATION;  Service: Cardiology;  Laterality: N/A;    CARDIAC CATHETERIZATION N/A 01/11/2023    Procedure: Coronary angiography;  Surgeon: Jacque Camp MD;  Location:  [facility] INVASIVE LOCATION;  Service: Cardiology;  Laterality: N/A;    CARDIAC CATHETERIZATION N/A 01/11/2023    Procedure: Left ventriculography;  Surgeon: Jacque Camp MD;  Location:  [facility] INVASIVE LOCATION;  Service: Cardiology;  Laterality: N/A;    CARDIAC CATHETERIZATION N/A 01/11/2023    Procedure: Percutaneous Coronary Intervention;  Surgeon: Jacque Camp MD;  Location:  [facility] INVASIVE LOCATION;  Service: Cardiology;  Laterality: N/A;    CARDIAC CATHETERIZATION N/A 01/11/2023    Procedure: Stent EUGENIO coronary;  Surgeon: Jacque Camp MD;  Location:  [facility] INVASIVE LOCATION;  Service: Cardiology;  Laterality: N/A;    CHOLECYSTECTOMY      CORONARY STENT PLACEMENT  1/112023    FRACTURE SURGERY      SHOULDER ARTHROSCOPY Bilateral     removal of bone spurs         FAMILY HISTORY  Family History   Problem Relation Age of Onset    Cancer Mother     Heart disease Mother     Other Father     No Known Problems Sister     No Known Problems Sister     No Known Problems Sister     No Known Problems Sister     Cancer Sister     Other Sister     Other Sister     Cancer Brother         throat         SOCIAL HISTORY  Social History     Socioeconomic History    Marital status:    Tobacco Use    Smoking status: Never    Smokeless tobacco: Never   Vaping Use    Vaping Use: Never used   Substance and Sexual Activity    Alcohol use: Not Currently     Comment: Less than 2 a month    Drug use: Never    Sexual activity: Yes     Partners: Female     Birth control/protection: Condom, Pill, None         ALLERGIES  Patient has no known allergies.        REVIEW OF SYSTEMS  Review of Systems   Constitutional:  Negative for activity change and fever.   HENT: Negative.     Eyes:  Negative for pain and visual disturbance.    Respiratory:  Positive for shortness of breath. Negative for cough.    Cardiovascular:  Positive for chest pain. Negative for leg swelling.   Gastrointestinal:  Negative for abdominal pain.   Genitourinary:  Negative for dysuria.   Skin:  Negative for color change.   Neurological:  Negative for syncope and headaches.   All other systems reviewed and are negative.         PHYSICAL EXAM  ED Triage Vitals   Temp Heart Rate Resp BP SpO2   09/18/23 1451 09/18/23 1451 09/18/23 1451 09/18/23 1456 09/18/23 1451   97.8 °F (36.6 °C) 80 16 180/87 98 %      Temp src Heart Rate Source Patient Position BP Location FiO2 (%)   -- -- -- -- --              Physical Exam      GENERAL: Calm, no diaphoresis, no acute distress  HENT: nares patent, normocephalic and atraumatic  EYES: no scleral icterus, EOMI, normal conjunctivae  CV: regular rhythm, normal rate, normal distal pulses no murmurs  RESPIRATORY: normal effort, no stridor clear to auscultation bilaterally  ABDOMEN: soft nontender in all quadrants, no masses  MUSCULOSKELETAL: no deformity, no asymmetry, no lower extremity edema  NEURO: alert, moves all extremities, follows commands  PSYCH:  calm, cooperative  SKIN: warm, dry    Vital signs and nursing notes reviewed.        LAB RESULTS  Recent Results (from the past 24 hour(s))   ECG 12 Lead Chest Pain    Collection Time: 09/18/23  2:54 PM   Result Value Ref Range    QT Interval 522 ms    QTC Interval 595 ms   Comprehensive Metabolic Panel    Collection Time: 09/18/23  3:45 PM    Specimen: Blood   Result Value Ref Range    Glucose 264 (H) 65 - 99 mg/dL    BUN 18 8 - 23 mg/dL    Creatinine 1.13 0.76 - 1.27 mg/dL    Sodium 137 136 - 145 mmol/L    Potassium 4.4 3.5 - 5.2 mmol/L    Chloride 102 98 - 107 mmol/L    CO2 21.0 (L) 22.0 - 29.0 mmol/L    Calcium 8.9 8.6 - 10.5 mg/dL    Total Protein 5.7 (L) 6.0 - 8.5 g/dL    Albumin 3.8 3.5 - 5.2 g/dL    ALT (SGPT) 14 1 - 41 U/L    AST (SGOT) 17 1 - 40 U/L    Alkaline Phosphatase 70 39 -  117 U/L    Total Bilirubin 0.3 0.0 - 1.2 mg/dL    Globulin 1.9 gm/dL    A/G Ratio 2.0 g/dL    BUN/Creatinine Ratio 15.9 7.0 - 25.0    Anion Gap 14.0 5.0 - 15.0 mmol/L    eGFR 73.5 >60.0 mL/min/1.73   High Sensitivity Troponin T    Collection Time: 09/18/23  3:45 PM    Specimen: Blood   Result Value Ref Range    HS Troponin T 45 (H) <15 ng/L   Green Top (Gel)    Collection Time: 09/18/23  3:45 PM   Result Value Ref Range    Extra Tube Hold for add-ons.    Lavender Top    Collection Time: 09/18/23  3:45 PM   Result Value Ref Range    Extra Tube hold for add-on    Gold Top - SST    Collection Time: 09/18/23  3:45 PM   Result Value Ref Range    Extra Tube Hold for add-ons.    Light Blue Top    Collection Time: 09/18/23  3:45 PM   Result Value Ref Range    Extra Tube Hold for add-ons.    CBC Auto Differential    Collection Time: 09/18/23  3:45 PM    Specimen: Blood   Result Value Ref Range    WBC 6.32 3.40 - 10.80 10*3/mm3    RBC 4.49 4.14 - 5.80 10*6/mm3    Hemoglobin 13.6 13.0 - 17.7 g/dL    Hematocrit 40.0 37.5 - 51.0 %    MCV 89.1 79.0 - 97.0 fL    MCH 30.3 26.6 - 33.0 pg    MCHC 34.0 31.5 - 35.7 g/dL    RDW 12.6 12.3 - 15.4 %    RDW-SD 41.0 37.0 - 54.0 fl    MPV 9.3 6.0 - 12.0 fL    Platelets 287 140 - 450 10*3/mm3    Neutrophil % 69.3 42.7 - 76.0 %    Lymphocyte % 19.3 (L) 19.6 - 45.3 %    Monocyte % 9.0 5.0 - 12.0 %    Eosinophil % 1.3 0.3 - 6.2 %    Basophil % 0.8 0.0 - 1.5 %    Immature Grans % 0.3 0.0 - 0.5 %    Neutrophils, Absolute 4.38 1.70 - 7.00 10*3/mm3    Lymphocytes, Absolute 1.22 0.70 - 3.10 10*3/mm3    Monocytes, Absolute 0.57 0.10 - 0.90 10*3/mm3    Eosinophils, Absolute 0.08 0.00 - 0.40 10*3/mm3    Basophils, Absolute 0.05 0.00 - 0.20 10*3/mm3    Immature Grans, Absolute 0.02 0.00 - 0.05 10*3/mm3    nRBC 0.0 0.0 - 0.2 /100 WBC   Protime-INR    Collection Time: 09/18/23  3:45 PM    Specimen: Blood   Result Value Ref Range    Protime 13.0 11.7 - 14.2 Seconds    INR 0.97 0.90 - 1.10   aPTT     Collection Time: 09/18/23  3:45 PM    Specimen: Blood   Result Value Ref Range    PTT 26.3 22.7 - 35.4 seconds       Ordered the above labs and reviewed the results.        RADIOLOGY  XR Chest 1 View    Result Date: 9/18/2023  AP CHEST  HISTORY: Chest pain  COMPARISON: 1/10/2023  FINDINGS: Lungs are clear. Heart size normal. No appreciable pneumothorax. Visualized osseous structures are unremarkable      No acute findings    This report was finalized on 9/18/2023 3:43 PM by Dr. Parveen Avalos M.D.       Ordered the above noted radiological studies. Reviewed by me in PACS.          PROCEDURES  Procedures      EKG           EKG time/Interp time: 1454/1520  Rhythm/Rate: Sinus rhythm, 70 bpm  P waves and FL: Present, 155 ms  QRS, axis: 83 ms, normal axis  ST and T waves: No ST segment elevations are present.    Independently interpreted by me contemporaneously with treatment      MEDICATIONS GIVEN IN ER  Medications   sodium chloride 0.9 % flush 10 mL (has no administration in time range)   nitroglycerin (NITROSTAT) SL tablet 0.4 mg (has no administration in time range)   sodium chloride 0.9 % flush 10 mL (has no administration in time range)   sodium chloride 0.9 % flush 10 mL (has no administration in time range)   sodium chloride 0.9 % infusion 40 mL (has no administration in time range)   aspirin chewable tablet 324 mg (has no administration in time range)     And   aspirin EC tablet 81 mg (has no administration in time range)   sennosides-docusate (PERICOLACE) 8.6-50 MG per tablet 2 tablet (has no administration in time range)     And   polyethylene glycol (MIRALAX) packet 17 g (has no administration in time range)     And   bisacodyl (DULCOLAX) EC tablet 5 mg (has no administration in time range)     And   bisacodyl (DULCOLAX) suppository 10 mg (has no administration in time range)   aspirin tablet 325 mg (325 mg Oral Given 9/18/23 1555)   nitroglycerin (NITROSTAT) ointment 1 inch (1 inch Topical Given 9/18/23  1624)       MEDICAL DECISION MAKING, PROGRESS, and CONSULTS    All labs have been independently reviewed by me.  All radiology studies have been reviewed by me and I have also reviewed the radiology report.   EKG's independently viewed and interpreted by me.  Discussion below represents my analysis of pertinent findings related to patient's condition, differential diagnosis, treatment plan and final disposition.    Heart score: 5    Additional sources:  - External (non-ED) record review: I reviewed the most recent progress note from September 13, 2023 when patient was evaluated for acute URI symptoms as well as otitis media symptoms.  Also reviewed echocardiogram from February 8 13th, 2023.  Ejection fraction 60% at that time.          Orders placed during this visit:  Orders Placed This Encounter   Procedures    XR Chest 1 View    [place] Draw    Comprehensive Metabolic Panel    High Sensitivity Troponin T    CBC Auto Differential    Protime-INR    aPTT    High Sensitivity Troponin T 2Hr    Basic Metabolic Panel    CBC (No Diff)    High Sensitivity Troponin T    Lipid Panel    Magnesium    NPO Diet NPO Type: Sips with Meds    Undress & Gown    Monitor Blood Pressure    Vital Signs Every 15 Minutes Until Stable, Then Every 4 Hours    Telemetry - Place Orders & Notify Provider of Results When Patient Experiences Acute Chest Pain, Dysrhythmia or Respiratory Distress    May Be Off Telemetry for Tests    Pulse Oximetry, Continuous    Notify Physician For Unrelieved Chest Pain    Intake & Output    Weigh Patient    Nurse to Order Troponin As Needed For Unrelieved or New Chest Pain    Saline Lock & Maintain IV Access    Inpatient Cardiology Consult    Oxygen Therapy- Nasal Cannula; Titrate 1-6 LPM Per SpO2; 90 - 95%    ECG 12 Lead Chest Pain    ECG 12 Lead ED Triage Standing Order; Chest Pain    ECG 12 Lead Chest Pain    ECG 12 Lead Chest Pain    ECG 12 Lead Chest Pain    Adult Transthoracic Echo Complete W/ Cont if  Necessary Per Protocol    Insert Peripheral IV    Insert Peripheral IV    Initiate ED Observation Status    CBC & Differential    Green Top (Gel)    Lavender Top    Gold Top - SST    Light Blue Top           Differential diagnosis includes but is not limited to:    Acute MI, unstable angina, GERD, anxiety, pulmonary embolism, pneumonia      Independent interpretation of labs, radiology studies, and discussions with consultants:  ED Course as of 09/18/23 1716   Mon Sep 18, 2023   1547 I independently interpreted the Chest X-ray and my findings are: No Pneumothorax, No Effusion, No Infiltrate   [CARLOS]   1629 HS Troponin T(!): 45 [CARLOS]   1715 First troponin is indeterminate range.  Blood sugars elevated as well.  Given patient's past history he will certainly require further cardiac work-up and observation on telemetry.  I discussed with Maureen in the observation unit and she agrees to accept the patient for further care on behalf of Dr. Fiorella mcguire. [CARLOS]      ED Course User Index  [CARLOS] Daniel Trejo MD         DIAGNOSIS  Final diagnoses:   Chest pain, unspecified type   Hypertension, unspecified type   Hyperglycemia         DISPOSITION  To observation unit        Latest Documented Vital Signs:  As of 17:16 EDT  BP- 138/69 HR- 92 Temp- 97.8 °F (36.6 °C) O2 sat- 96%              --    Please note that portions of this were completed with a voice recognition program.       Note Disclaimer: At [facility], we believe that sharing information builds trust and better relationships. You are receiving this note because you are receiving care at [facility] or recently visited. It is possible you will see health information before a provider has talked with you about it. This kind of information can be easy to misunderstand. To help you fully understand what it means for your health, we urge you to discuss this note with your provider.             Daniel Trejo MD  09/18/23 1716     Hypertriglyceridemia Monitoring: I explained this is common when taking isotretinoin. We will monitor closely.

## 2023-09-18 NOTE — PLAN OF CARE
Goal Outcome Evaluation:   Pt came to obs unit for chest pain/eval/tx. Pt has had relief of chest pain since admit to ed. Pt states his chest pain is pressure of 1. His troponins were positive and pt had cardiac catheterization 1/23 . Pt states they left 1 blockage 2nd to fear of rupture. Pt reports he feels a card cath is imminent on 9/19/23.

## 2023-09-19 ENCOUNTER — APPOINTMENT (OUTPATIENT)
Dept: CARDIOLOGY | Facility: HOSPITAL | Age: 63
DRG: 247 | End: 2023-09-19
Payer: COMMERCIAL

## 2023-09-19 LAB
ALPHA-GAL IGE QN: <0.1 KU/L
ANION GAP SERPL CALCULATED.3IONS-SCNC: 9.9 MMOL/L (ref 5–15)
AORTIC ARCH: 2.9 CM
ASCENDING AORTA: 2.8 CM
BH CV ECHO MEAS - ACS: 1.94 CM
BH CV ECHO MEAS - AO MAX PG: 3.1 MMHG
BH CV ECHO MEAS - AO MEAN PG: 1.62 MMHG
BH CV ECHO MEAS - AO ROOT DIAM: 3.6 CM
BH CV ECHO MEAS - AO V2 MAX: 88.4 CM/SEC
BH CV ECHO MEAS - AO V2 VTI: 19 CM
BH CV ECHO MEAS - AVA(I,D): 3.4 CM2
BH CV ECHO MEAS - EDV(CUBED): 60.5 ML
BH CV ECHO MEAS - EDV(MOD-SP2): 55 ML
BH CV ECHO MEAS - EDV(MOD-SP4): 70 ML
BH CV ECHO MEAS - EF(MOD-BP): 58.5 %
BH CV ECHO MEAS - EF(MOD-SP2): 56.4 %
BH CV ECHO MEAS - EF(MOD-SP4): 61.4 %
BH CV ECHO MEAS - ESV(CUBED): 18.7 ML
BH CV ECHO MEAS - ESV(MOD-SP2): 24 ML
BH CV ECHO MEAS - ESV(MOD-SP4): 27 ML
BH CV ECHO MEAS - FS: 32.4 %
BH CV ECHO MEAS - IVS/LVPW: 0.97 CM
BH CV ECHO MEAS - IVSD: 0.97 CM
BH CV ECHO MEAS - LAT PEAK E' VEL: 8.4 CM/SEC
BH CV ECHO MEAS - LV MASS(C)D: 120.4 GRAMS
BH CV ECHO MEAS - LV MAX PG: 2.38 MMHG
BH CV ECHO MEAS - LV MEAN PG: 1.1 MMHG
BH CV ECHO MEAS - LV V1 MAX: 77.1 CM/SEC
BH CV ECHO MEAS - LV V1 VTI: 19.9 CM
BH CV ECHO MEAS - LVIDD: 3.9 CM
BH CV ECHO MEAS - LVIDS: 2.7 CM
BH CV ECHO MEAS - LVOT AREA: 3.2 CM2
BH CV ECHO MEAS - LVOT DIAM: 2.03 CM
BH CV ECHO MEAS - LVPWD: 1 CM
BH CV ECHO MEAS - MED PEAK E' VEL: 6.7 CM/SEC
BH CV ECHO MEAS - MV A DUR: 0.16 SEC
BH CV ECHO MEAS - MV A MAX VEL: 57 CM/SEC
BH CV ECHO MEAS - MV DEC SLOPE: 273 CM/SEC2
BH CV ECHO MEAS - MV DEC TIME: 0.24 SEC
BH CV ECHO MEAS - MV E MAX VEL: 68.6 CM/SEC
BH CV ECHO MEAS - MV E/A: 1.2
BH CV ECHO MEAS - MV MAX PG: 3 MMHG
BH CV ECHO MEAS - MV MEAN PG: 1.27 MMHG
BH CV ECHO MEAS - MV P1/2T: 67.4 MSEC
BH CV ECHO MEAS - MV V2 VTI: 17.8 CM
BH CV ECHO MEAS - MVA(P1/2T): 3.3 CM2
BH CV ECHO MEAS - MVA(VTI): 3.6 CM2
BH CV ECHO MEAS - PA ACC TIME: 0.17 SEC
BH CV ECHO MEAS - PA V2 MAX: 96.9 CM/SEC
BH CV ECHO MEAS - PULM A REVS DUR: 0.11 SEC
BH CV ECHO MEAS - PULM A REVS VEL: 21 CM/SEC
BH CV ECHO MEAS - PULM DIAS VEL: 29.6 CM/SEC
BH CV ECHO MEAS - PULM S/D: 1.48
BH CV ECHO MEAS - PULM SYS VEL: 43.7 CM/SEC
BH CV ECHO MEAS - QP/QS: 0.59
BH CV ECHO MEAS - RAP SYSTOLE: 3 MMHG
BH CV ECHO MEAS - RV MAX PG: 1.76 MMHG
BH CV ECHO MEAS - RV V1 MAX: 66.4 CM/SEC
BH CV ECHO MEAS - RV V1 VTI: 13.5 CM
BH CV ECHO MEAS - RVOT DIAM: 1.9 CM
BH CV ECHO MEAS - RVSP: 31 MMHG
BH CV ECHO MEAS - SV(LVOT): 64.4 ML
BH CV ECHO MEAS - SV(MOD-SP2): 31 ML
BH CV ECHO MEAS - SV(MOD-SP4): 43 ML
BH CV ECHO MEAS - SV(RVOT): 38.3 ML
BH CV ECHO MEAS - TR MAX PG: 28.2 MMHG
BH CV ECHO MEAS - TR MAX VEL: 265.6 CM/SEC
BH CV ECHO MEASUREMENTS AVERAGE E/E' RATIO: 9.09
BH CV XLRA - RV BASE: 2.6 CM
BH CV XLRA - RV LENGTH: 5.8 CM
BH CV XLRA - RV MID: 2.6 CM
BH CV XLRA - TDI S': 6.4 CM/SEC
BUN SERPL-MCNC: 15 MG/DL (ref 8–23)
BUN/CREAT SERPL: 16.7 (ref 7–25)
CALCIUM SPEC-SCNC: 8.8 MG/DL (ref 8.6–10.5)
CHLORIDE SERPL-SCNC: 106 MMOL/L (ref 98–107)
CO2 SERPL-SCNC: 25.1 MMOL/L (ref 22–29)
CREAT SERPL-MCNC: 0.9 MG/DL (ref 0.76–1.27)
DEPRECATED RDW RBC AUTO: 42 FL (ref 37–54)
EGFRCR SERPLBLD CKD-EPI 2021: 96.6 ML/MIN/1.73
ERYTHROCYTE [DISTWIDTH] IN BLOOD BY AUTOMATED COUNT: 13 % (ref 12.3–15.4)
GLUCOSE BLDC GLUCOMTR-MCNC: 243 MG/DL (ref 70–130)
GLUCOSE BLDC GLUCOMTR-MCNC: 80 MG/DL (ref 70–130)
GLUCOSE BLDC GLUCOMTR-MCNC: 96 MG/DL (ref 70–130)
GLUCOSE SERPL-MCNC: 107 MG/DL (ref 65–99)
HCT VFR BLD AUTO: 39.9 % (ref 37.5–51)
HGB BLD-MCNC: 13.5 G/DL (ref 13–17.7)
MAGNESIUM SERPL-MCNC: 1.9 MG/DL (ref 1.6–2.4)
MCH RBC QN AUTO: 29.8 PG (ref 26.6–33)
MCHC RBC AUTO-ENTMCNC: 33.8 G/DL (ref 31.5–35.7)
MCV RBC AUTO: 88.1 FL (ref 79–97)
PLATELET # BLD AUTO: 281 10*3/MM3 (ref 140–450)
PMV BLD AUTO: 9 FL (ref 6–12)
POTASSIUM SERPL-SCNC: 3.9 MMOL/L (ref 3.5–5.2)
QT INTERVAL: 436 MS
QTC INTERVAL: 478 MS
RBC # BLD AUTO: 4.53 10*6/MM3 (ref 4.14–5.8)
SINUS: 3.5 CM
SODIUM SERPL-SCNC: 141 MMOL/L (ref 136–145)
STJ: 3.1 CM
TROPONIN T SERPL HS-MCNC: 48 NG/L
WBC NRBC COR # BLD: 5.14 10*3/MM3 (ref 3.4–10.8)

## 2023-09-19 PROCEDURE — 25010000002 HEPARIN (PORCINE) PER 1000 UNITS: Performed by: INTERNAL MEDICINE

## 2023-09-19 PROCEDURE — 85027 COMPLETE CBC AUTOMATED: CPT | Performed by: NURSE PRACTITIONER

## 2023-09-19 PROCEDURE — 25010000002 FENTANYL CITRATE (PF) 50 MCG/ML SOLUTION: Performed by: INTERNAL MEDICINE

## 2023-09-19 PROCEDURE — 85347 COAGULATION TIME ACTIVATED: CPT

## 2023-09-19 PROCEDURE — C1725 CATH, TRANSLUMIN NON-LASER: HCPCS | Performed by: INTERNAL MEDICINE

## 2023-09-19 PROCEDURE — G0378 HOSPITAL OBSERVATION PER HR: HCPCS

## 2023-09-19 PROCEDURE — 25010000002 MIDAZOLAM PER 1 MG: Performed by: INTERNAL MEDICINE

## 2023-09-19 PROCEDURE — B2151ZZ FLUOROSCOPY OF LEFT HEART USING LOW OSMOLAR CONTRAST: ICD-10-PCS | Performed by: INTERNAL MEDICINE

## 2023-09-19 PROCEDURE — C9600 PERC DRUG-EL COR STENT SING: HCPCS | Performed by: INTERNAL MEDICINE

## 2023-09-19 PROCEDURE — C1887 CATHETER, GUIDING: HCPCS | Performed by: INTERNAL MEDICINE

## 2023-09-19 PROCEDURE — B221Z2Z COMPUTERIZED TOMOGRAPHY (CT SCAN) OF MULTIPLE CORONARY ARTERIES USING INTRAVASCULAR OPTICAL COHERENCE: ICD-10-PCS | Performed by: INTERNAL MEDICINE

## 2023-09-19 PROCEDURE — 63710000001 INSULIN LISPRO (HUMAN) PER 5 UNITS: Performed by: INTERNAL MEDICINE

## 2023-09-19 PROCEDURE — 4A023N7 MEASUREMENT OF CARDIAC SAMPLING AND PRESSURE, LEFT HEART, PERCUTANEOUS APPROACH: ICD-10-PCS | Performed by: INTERNAL MEDICINE

## 2023-09-19 PROCEDURE — 84484 ASSAY OF TROPONIN QUANT: CPT | Performed by: NURSE PRACTITIONER

## 2023-09-19 PROCEDURE — B2111ZZ FLUOROSCOPY OF MULTIPLE CORONARY ARTERIES USING LOW OSMOLAR CONTRAST: ICD-10-PCS | Performed by: INTERNAL MEDICINE

## 2023-09-19 PROCEDURE — 93306 TTE W/DOPPLER COMPLETE: CPT | Performed by: INTERNAL MEDICINE

## 2023-09-19 PROCEDURE — 25510000001 IOPAMIDOL PER 1 ML: Performed by: INTERNAL MEDICINE

## 2023-09-19 PROCEDURE — 93306 TTE W/DOPPLER COMPLETE: CPT

## 2023-09-19 PROCEDURE — C1753 CATH, INTRAVAS ULTRASOUND: HCPCS | Performed by: INTERNAL MEDICINE

## 2023-09-19 PROCEDURE — 99213 OFFICE O/P EST LOW 20 MIN: CPT | Performed by: STUDENT IN AN ORGANIZED HEALTH CARE EDUCATION/TRAINING PROGRAM

## 2023-09-19 PROCEDURE — 93005 ELECTROCARDIOGRAM TRACING: CPT | Performed by: NURSE PRACTITIONER

## 2023-09-19 PROCEDURE — 82948 REAGENT STRIP/BLOOD GLUCOSE: CPT

## 2023-09-19 PROCEDURE — 027034Z DILATION OF CORONARY ARTERY, ONE ARTERY WITH DRUG-ELUTING INTRALUMINAL DEVICE, PERCUTANEOUS APPROACH: ICD-10-PCS | Performed by: INTERNAL MEDICINE

## 2023-09-19 PROCEDURE — 80048 BASIC METABOLIC PNL TOTAL CA: CPT | Performed by: NURSE PRACTITIONER

## 2023-09-19 PROCEDURE — 99153 MOD SED SAME PHYS/QHP EA: CPT | Performed by: INTERNAL MEDICINE

## 2023-09-19 PROCEDURE — 93010 ELECTROCARDIOGRAM REPORT: CPT | Performed by: INTERNAL MEDICINE

## 2023-09-19 PROCEDURE — 92978 ENDOLUMINL IVUS OCT C 1ST: CPT | Performed by: INTERNAL MEDICINE

## 2023-09-19 PROCEDURE — 92928 PRQ TCAT PLMT NTRAC ST 1 LES: CPT | Performed by: INTERNAL MEDICINE

## 2023-09-19 PROCEDURE — 99152 MOD SED SAME PHYS/QHP 5/>YRS: CPT | Performed by: INTERNAL MEDICINE

## 2023-09-19 PROCEDURE — C1874 STENT, COATED/COV W/DEL SYS: HCPCS | Performed by: INTERNAL MEDICINE

## 2023-09-19 PROCEDURE — 93458 L HRT ARTERY/VENTRICLE ANGIO: CPT | Performed by: INTERNAL MEDICINE

## 2023-09-19 PROCEDURE — 83735 ASSAY OF MAGNESIUM: CPT | Performed by: NURSE PRACTITIONER

## 2023-09-19 PROCEDURE — C1769 GUIDE WIRE: HCPCS | Performed by: INTERNAL MEDICINE

## 2023-09-19 PROCEDURE — C1894 INTRO/SHEATH, NON-LASER: HCPCS | Performed by: INTERNAL MEDICINE

## 2023-09-19 DEVICE — XIENCE SKYPOINT™ EVEROLIMUS ELUTING CORONARY STENT SYSTEM 3.50 MM X 18 MM / RAPID-EXCHANGE
Type: IMPLANTABLE DEVICE | Site: HEART | Status: FUNCTIONAL
Brand: XIENCE SKYPOINT™

## 2023-09-19 RX ORDER — HEPARIN SODIUM 1000 [USP'U]/ML
INJECTION, SOLUTION INTRAVENOUS; SUBCUTANEOUS
Status: DISCONTINUED | OUTPATIENT
Start: 2023-09-19 | End: 2023-09-19 | Stop reason: HOSPADM

## 2023-09-19 RX ORDER — ACETAMINOPHEN 325 MG/1
650 TABLET ORAL EVERY 4 HOURS PRN
Status: DISCONTINUED | OUTPATIENT
Start: 2023-09-19 | End: 2023-09-20 | Stop reason: HOSPADM

## 2023-09-19 RX ORDER — FENTANYL CITRATE 50 UG/ML
INJECTION, SOLUTION INTRAMUSCULAR; INTRAVENOUS
Status: DISCONTINUED | OUTPATIENT
Start: 2023-09-19 | End: 2023-09-19 | Stop reason: HOSPADM

## 2023-09-19 RX ORDER — MIDAZOLAM HYDROCHLORIDE 1 MG/ML
INJECTION INTRAMUSCULAR; INTRAVENOUS
Status: DISCONTINUED | OUTPATIENT
Start: 2023-09-19 | End: 2023-09-19 | Stop reason: HOSPADM

## 2023-09-19 RX ORDER — VERAPAMIL HYDROCHLORIDE 2.5 MG/ML
INJECTION, SOLUTION INTRAVENOUS
Status: DISCONTINUED | OUTPATIENT
Start: 2023-09-19 | End: 2023-09-19 | Stop reason: HOSPADM

## 2023-09-19 RX ORDER — LIDOCAINE HYDROCHLORIDE 20 MG/ML
INJECTION, SOLUTION INFILTRATION; PERINEURAL
Status: DISCONTINUED | OUTPATIENT
Start: 2023-09-19 | End: 2023-09-19 | Stop reason: HOSPADM

## 2023-09-19 RX ADMIN — INSULIN LISPRO 4 UNITS: 100 INJECTION, SOLUTION INTRAVENOUS; SUBCUTANEOUS at 20:59

## 2023-09-19 RX ADMIN — ASPIRIN 81 MG: 81 TABLET, COATED ORAL at 12:53

## 2023-09-19 RX ADMIN — ACETAMINOPHEN 650 MG: 325 TABLET ORAL at 10:04

## 2023-09-19 RX ADMIN — Medication 10 ML: at 21:00

## 2023-09-19 RX ADMIN — PANTOPRAZOLE SODIUM 40 MG: 40 TABLET, DELAYED RELEASE ORAL at 08:09

## 2023-09-19 RX ADMIN — LISINOPRIL 10 MG: 10 TABLET ORAL at 08:09

## 2023-09-19 RX ADMIN — Medication 10 ML: at 08:01

## 2023-09-19 RX ADMIN — ATORVASTATIN CALCIUM 40 MG: 20 TABLET, FILM COATED ORAL at 20:59

## 2023-09-19 RX ADMIN — PRASUGREL 10 MG: 10 TABLET, FILM COATED ORAL at 12:53

## 2023-09-19 NOTE — CONSULTS
Date of Hospital Visit: 23  Encounter Provider: Devon Cristobal MD  Place of Service: [facility] CARDIOLOGY  Patient Name: Sadiq Aldana  :1960  Referral Provider: No ref. provider found    Chief complaint Chest Pain    History of Present Illness   Mr. Sadiq Aldana is a 62 year old man with  CAD, S/P PCI of distal RCA and prox CODY in 2023, DM2, HLD, GERD, HTN, and MIGUEL who presented to the ER on 2023 with complaints of persistent chest pain since last Friday. He reports the pain is worse with movement / exertion and improves with rest. He states the pain radiates from the central chest to his left arm which makes him feel short of breath. He also describes it as a burning sensation. Denies any N/V, diaphoresis, or back pain. He has not changed any of his medications recently. He believes the onset of this pain is worse than his episodes in early January.    On arrival to the ED, he was afebrile, pulse was 80, respirate 16, blood pressure 180/87.EKG with normal sinus rhythm, diffuse T wave flattening.  High-sensitivity troponin 47 -> 48.     Of note, he presented to the ER in 2023 with chest pain and shortness of breath on exertion that started in 2022. He came for an evaluation because he reported the pain was worsening and happening with minimal exertion. He was taken to the cardiac cath lab by Dr Camp where he underwent PCI with drug-eluting stent (postdilated with a 3.5 mm NC) to distal RCA/ostial PL. He was started on effient and metoprolol and discharged home.       ECHO 2023    Left ventricular systolic function is normal. Calculated left ventricular EF = 60.7% Normal left ventricular cavity size and wall thickness noted. All left ventricular wall segments contract normally. Left ventricular diastolic function is consistent with (grade I) impaired relaxation.    Trace tricuspid valve regurgitation is present. Estimated right  ventricular systolic pressure from tricuspid regurgitation is normal (<35 mmHg).    Cardiac Cath 1/11/2023  1.  Coronary artery disease status post PCI of the distal RCA and proximal CODY:  *LAD with proximal 50% and mid 30 to 40% stenosis  *No significant disease of the left circumflex artery  *99% stenosis of the distal RCA and ostial posterior lateral branch now status post drug-eluting stent placement with a Xience Skypoint 3.0 x 28 mm stent (postdilated proximally with a 3.5 mm noncompliant balloon) and balloon angioplasty of the ostial to proximal posterior descending branch    Past Medical History:   Diagnosis Date    Cholelithiasis     Removed    Depression 6/22    Cwife    Diabetes mellitus     Erectile dysfunction     GERD (gastroesophageal reflux disease)     Hyperlipidemia     Hypertension     Obstructive sleep apnea syndrome     Peptic ulceration        Past Surgical History:   Procedure Laterality Date    CARDIAC CATHETERIZATION N/A 01/11/2023    Procedure: Left Heart Cath;  Surgeon: Jacque Camp MD;  Location: [facility] CATH INVASIVE LOCATION;  Service: Cardiology;  Laterality: N/A;    CARDIAC CATHETERIZATION N/A 01/11/2023    Procedure: Coronary angiography;  Surgeon: Jacque Camp MD;  Location: [facility] CATH INVASIVE LOCATION;  Service: Cardiology;  Laterality: N/A;    CARDIAC CATHETERIZATION N/A 01/11/2023    Procedure: Left ventriculography;  Surgeon: Jacque Camp MD;  Location: [facility] CATH INVASIVE LOCATION;  Service: Cardiology;  Laterality: N/A;    CARDIAC CATHETERIZATION N/A 01/11/2023    Procedure: Percutaneous Coronary Intervention;  Surgeon: Jacque Camp MD;  Location: [facility] CATH INVASIVE LOCATION;  Service: Cardiology;  Laterality: N/A;    CARDIAC CATHETERIZATION N/A 01/11/2023    Procedure: Stent EUGENIO coronary;  Surgeon: Jacque Camp MD;  Location: [facility] CATH INVASIVE LOCATION;  Service: Cardiology;  Laterality: N/A;    CHOLECYSTECTOMY      CORONARY STENT PLACEMENT   [id number]    FRACTURE SURGERY      SHOULDER ARTHROSCOPY Bilateral     removal of bone spurs       Medications Prior to Admission   Medication Sig Dispense Refill Last Dose    aspirin 81 MG EC tablet Take 1 tablet by mouth Daily. 90 tablet 4 9/18/2023    atorvastatin (Lipitor) 40 MG tablet Take 1 tablet by mouth Daily. 30 tablet 5 9/17/2023 at 2100    empagliflozin (Jardiance) 25 MG tablet tablet Take 1 tablet by mouth Daily.   9/18/2023 at 0800    Empagliflozin-metFORMIN HCl ER (Synjardy XR) 12.5-1000 MG tablet sustained-release 24 hour Take 1 tablet by mouth 2 (Two) Times a Day.   Patient Taking Differently    esomeprazole (nexIUM) 40 MG capsule Take 1 capsule by mouth Every Morning Before Breakfast. 90 capsule 1 Past Week    lisinopril (PRINIVIL,ZESTRIL) 10 MG tablet Take 1 tablet by mouth Daily. 30 tablet 5 9/18/2023 at 0800    metFORMIN (GLUCOPHAGE) 1000 MG tablet Take 1 tablet by mouth 2 (Two) Times a Day With Meals.   9/18/2023 at 0800    metoprolol tartrate (LOPRESSOR) 25 MG tablet Take 0.5 tablets by mouth Every 12 (Twelve) Hours. 30 tablet 5 9/18/2023 at 0800    prasugrel (EFFIENT) 10 MG tablet Take 1 tablet by mouth Daily. 30 tablet 11 9/18/2023 at 0800    Tresiba FlexTouch 100 UNIT/ML solution pen-injector injection Inject 32 Units under the skin into the appropriate area as directed Every Night. Doing in am now 200 mL 5 9/18/2023 at 0800    cholecalciferol (VITAMIN D3) 10 MCG (400 UNIT) tablet Take 1 tablet by mouth Daily. 90 tablet 4     Continuous Blood Gluc Sensor (FreeStyle Eulalio 14 Day Sensor) misc 1 Units 3 (Three) Times a Day. 9 each 9     fluticasone (FLONASE) 50 MCG/ACT nasal spray 2 sprays into the nostril(s) as directed by provider Daily for 14 days. 16 g 0     ondansetron (Zofran) 4 MG tablet Take 1 tablet by mouth Every 8 (Eight) Hours As Needed for Nausea or Vomiting. 12 tablet 0     Semaglutide (Rybelsus) 7 MG tablet Take 7 mg by mouth Daily. 30 tablet 5     sildenafil (VIAGRA) 25 MG tablet  "TAKE TWO TABLETS BY MOUTH DAILY AS NEEDED FOR ERECTILE DYSFUNCTION 90 tablet 0        Current Meds  Scheduled Meds:aspirin, 324 mg, Oral, Once   And  aspirin, 81 mg, Oral, Daily  atorvastatin, 40 mg, Oral, Daily  insulin lispro, 2-9 Units, Subcutaneous, 4x Daily AC & at Bedtime  lisinopril, 10 mg, Oral, Daily  pantoprazole, 40 mg, Oral, Q AM  prasugrel, 10 mg, Oral, Daily  senna-docusate sodium, 2 tablet, Oral, BID  sodium chloride, 10 mL, Intravenous, Q12H      Continuous Infusions:   PRN Meds:.  acetaminophen    senna-docusate sodium **AND** polyethylene glycol **AND** bisacodyl **AND** bisacodyl    dextrose    dextrose    glucagon (human recombinant)    nitroglycerin    sodium chloride    sodium chloride    sodium chloride    Allergies as of 09/18/2023    (No Known Allergies)       Social History     Socioeconomic History    Marital status:    Tobacco Use    Smoking status: Never    Smokeless tobacco: Never   Vaping Use    Vaping Use: Never used   Substance and Sexual Activity    Alcohol use: Not Currently     Comment: Less than 2 a month    Drug use: Never    Sexual activity: Yes     Partners: Female     Birth control/protection: Condom, Pill, None       Family History   Problem Relation Age of Onset    Cancer Mother     Heart disease Mother     Other Father     No Known Problems Sister     No Known Problems Sister     No Known Problems Sister     No Known Problems Sister     Cancer Sister     Other Sister     Other Sister     Cancer Brother         throat       REVIEW OF SYSTEMS:   12 point ROS was performed and is negative except as outlined in HPI          Objective:   Temp:  [97.8 °F (36.6 °C)-98.6 °F (37 °C)] 97.9 °F (36.6 °C)  Heart Rate:  [60-92] 60  Resp:  [16-17] 16  BP: (107-180)/(66-93) 139/83  Body mass index is 29.35 kg/m².  Flowsheet Rows      Flowsheet Row First Filed Value   Admission Height 172.7 cm (68\") Documented at 09/18/2023 1736   Admission Weight 87.6 kg (193 lb 3.2 oz) Documented " at 09/18/2023 1631          Vitals:    09/19/23 0924   BP: 139/83   Pulse: 60   Resp:    Temp:    SpO2:        GEN: no distress, alert and oriented  HEENT: NACT, EOMI, moist mucous membranes  Lungs: CTAB, no wheezes, rales or rhonchi  CV: normal rate, regular rhythm, normal S1, S2, no murmurs, +2 radial pulses b/l, no carotid bruit  Abdomen: soft, nontender, nondistended, NABS  Extremities: no edema  Skin: no rash, warm, dry  Heme/Lymph: no bruising  Psych: organized thought, normal behavior and affect      Lab Review:   Results from last 7 days   Lab Units 09/19/23  0426 09/18/23  1545   SODIUM mmol/L 141 137   POTASSIUM mmol/L 3.9 4.4   CHLORIDE mmol/L 106 102   CO2 mmol/L 25.1 21.0*   BUN mg/dL 15 18   CREATININE mg/dL 0.90 1.13   CALCIUM mg/dL 8.8 8.9   BILIRUBIN mg/dL  --  0.3   ALK PHOS U/L  --  70   ALT (SGPT) U/L  --  14   AST (SGOT) U/L  --  17   GLUCOSE mg/dL 107* 264*     Results from last 7 days   Lab Units 09/19/23  0426 09/18/23  1713 09/18/23  1545   HSTROP T ng/L 48* 47* 45*     Results from last 7 days   Lab Units 09/19/23  0426 09/18/23  1545   WBC 10*3/mm3 5.14 6.32   HEMOGLOBIN g/dL 13.5 13.6   HEMATOCRIT % 39.9 40.0   PLATELETS 10*3/mm3 281 287     Results from last 7 days   Lab Units 09/18/23  1545   INR  0.97   APTT seconds 26.3     Results from last 7 days   Lab Units 09/19/23  0426   MAGNESIUM mg/dL 1.9         I personally viewed and interpreted the patient's EKG/Telemetry data)      Chest pain    Unstable angina    Assessment and Plan:  #Unstable angina  #CAD status post PCI to RCA in January 2023  #Hypertension  #Hyperlipidemia  #Diabetes  #GERD  #MIGUEL    62 year old man with  CAD, S/P PCI of distal RCA and prox CODY in Jan 2023, DM2, HLD, GERD, HTN, and MIGUEL who presented to the ER on 9/18/2023 with complaints of persistent chest pain since last Friday, found to have EKG at baseline with indeterminate troponins of 47-48.    Given progressive symptoms, will proceed with left heart  catheterization for definitive diagnosis and treatment.    -Left heart catheterization  -Continue aspirin 81 mg daily, Effient 10 mg daily, atorvastatin 40 mg daily, lisinopril 10 mg daily    Devon Cristobal MD  09/19/23  10:22 EDT.

## 2023-09-19 NOTE — PLAN OF CARE
Goal Outcome Evaluation:  Plan of Care Reviewed With: patient        Progress: improving  Outcome Evaluation: Patient reports dull / achy chest pain. Rates chest pain a 1/10 and states pain is unchanged from time of admission. Does not get better or worse with activity. VSS. Patient NPO after MN for cardiac testing this am.

## 2023-09-19 NOTE — PROGRESS NOTES
ED OBSERVATION PROGRESS/DISCHARGE SUMMARY    Date of Admission: 9/18/2023   LOS: 0 days   PCP: Ofelia Hernandez APRN    Final Diagnosis Chest pain      Subjective     Hospital Outcome:     Patient is a pleasant afebrile ambulatory 62 year old  gentleman admitted to the ED observation unit for chest discomfort.  His high-sensitivity troponins were trended from 45-47 and 48 this morning.  He endorses 2 out of 10 chest pressure this morning.  He was made n.p.o. after midnight.    He was seen and evaluated by Dr. Parker with cardiology service and deemed appropriate to undergo cardiac catheterization this afternoon, patient is agreeable to plan.    ROS:  General: no fevers, chills  Respiratory: no cough, dyspnea  Cardiovascular: no chest pain, palpitations  Abdomen: No abdominal pain, nausea, vomiting, or diarrhea  Neurologic: No focal weakness    Objective   Physical Exam:  I have reviewed the vital signs.  Temp:  [97.8 °F (36.6 °C)-98.6 °F (37 °C)] 97.9 °F (36.6 °C)  Heart Rate:  [76-92] 76  Resp:  [16-17] 16  BP: (107-180)/(66-93) 139/83  General Appearance:    Alert, cooperative, no distress  Head:    Normocephalic, atraumatic  Eyes:    Sclerae anicteric  Neck:   Supple, no mass  Lungs: Clear to auscultation bilaterally, respirations unlabored  Heart: Regular rate and rhythm, S1 and S2 normal, no murmur, rub or gallop  Abdomen:  Soft, non-tender, bowel sounds active, nondistended  Extremities: No clubbing, cyanosis, or edema to lower extremities  Pulses:  2+ and symmetric in distal lower extremities  Skin: No rashes   Neurologic: Oriented x3, Normal strength to extremities    Results Review:    I have reviewed the labs, radiology results and diagnostic studies.    Results from last 7 days   Lab Units 09/19/23  0426   WBC 10*3/mm3 5.14   HEMOGLOBIN g/dL 13.5   HEMATOCRIT % 39.9   PLATELETS 10*3/mm3 281     Results from last 7 days   Lab Units 09/19/23  0426 09/18/23  1545   SODIUM mmol/L 141 137   POTASSIUM  mmol/L 3.9 4.4   CHLORIDE mmol/L 106 102   CO2 mmol/L 25.1 21.0*   BUN mg/dL 15 18   CREATININE mg/dL 0.90 1.13   CALCIUM mg/dL 8.8 8.9   BILIRUBIN mg/dL  --  0.3   ALK PHOS U/L  --  70   ALT (SGPT) U/L  --  14   AST (SGOT) U/L  --  17   GLUCOSE mg/dL 107* 264*     Imaging Results (Last 24 Hours)       Procedure Component Value Units Date/Time    XR Chest 1 View [[id number]] Collected: 09/18/23 1542     Updated: 09/18/23 1546    Narrative:      AP CHEST     HISTORY: Chest pain     COMPARISON: 1/10/2023     FINDINGS: Lungs are clear. Heart size normal. No appreciable  pneumothorax. Visualized osseous structures are unremarkable       Impression:      No acute findings           This report was finalized on 9/18/2023 3:43 PM by Dr. Parveen Avalos M.D.               I have reviewed the medications.  ---------------------------------------------------------------------------------------------  Assessment & Plan   Assessment/Problem List    Chest pain      Plan:    CAD  Chest pain  -Initial troponin 45, repeat 47 with a delta of 2  -EKG nonischemic  -Chest x-ray negative for acute finding  -Cardiology consult, to admit and go for cardiac catheterization today  -N.p.o. after midnight     DM 2  -Accu-Chek before meals and at bedtime  -Insulin sliding scale     Hypertension  -Continue lisinopril     Disposition: To Cath Lab      This note will serve as a transfer and progress note    NEELA Johnson 09/19/23 08:03 EDT    I have worn appropriate PPE during this patient encounter, sanitized my hands both with entering and exiting patient's room.      59 minutes has been spent by [facility] providers in the care of this patient while under observation status

## 2023-09-20 ENCOUNTER — READMISSION MANAGEMENT (OUTPATIENT)
Dept: CALL CENTER | Facility: HOSPITAL | Age: 63
End: 2023-09-20
Payer: COMMERCIAL

## 2023-09-20 VITALS
DIASTOLIC BLOOD PRESSURE: 73 MMHG | SYSTOLIC BLOOD PRESSURE: 131 MMHG | HEIGHT: 68 IN | RESPIRATION RATE: 16 BRPM | BODY MASS INDEX: 29.25 KG/M2 | OXYGEN SATURATION: 95 % | WEIGHT: 193 LBS | TEMPERATURE: 98.1 F | HEART RATE: 92 BPM

## 2023-09-20 LAB
ACT BLD: 251 SECONDS (ref 82–152)
ACT BLD: 275 SECONDS (ref 82–152)
ANION GAP SERPL CALCULATED.3IONS-SCNC: 11.4 MMOL/L (ref 5–15)
BUN SERPL-MCNC: 14 MG/DL (ref 8–23)
BUN/CREAT SERPL: 14.1 (ref 7–25)
CALCIUM SPEC-SCNC: 9.2 MG/DL (ref 8.6–10.5)
CHLORIDE SERPL-SCNC: 105 MMOL/L (ref 98–107)
CO2 SERPL-SCNC: 23.6 MMOL/L (ref 22–29)
CREAT SERPL-MCNC: 0.99 MG/DL (ref 0.76–1.27)
DEPRECATED RDW RBC AUTO: 40.7 FL (ref 37–54)
EGFRCR SERPLBLD CKD-EPI 2021: 86.1 ML/MIN/1.73
ERYTHROCYTE [DISTWIDTH] IN BLOOD BY AUTOMATED COUNT: 12.8 % (ref 12.3–15.4)
GLUCOSE BLDC GLUCOMTR-MCNC: 109 MG/DL (ref 70–130)
GLUCOSE SERPL-MCNC: 111 MG/DL (ref 65–99)
HCT VFR BLD AUTO: 41.3 % (ref 37.5–51)
HGB BLD-MCNC: 14.2 G/DL (ref 13–17.7)
MCH RBC QN AUTO: 30 PG (ref 26.6–33)
MCHC RBC AUTO-ENTMCNC: 34.4 G/DL (ref 31.5–35.7)
MCV RBC AUTO: 87.3 FL (ref 79–97)
PLATELET # BLD AUTO: 306 10*3/MM3 (ref 140–450)
PMV BLD AUTO: 8.9 FL (ref 6–12)
POTASSIUM SERPL-SCNC: 4 MMOL/L (ref 3.5–5.2)
QT INTERVAL: 354 MS
QTC INTERVAL: 443 MS
RBC # BLD AUTO: 4.73 10*6/MM3 (ref 4.14–5.8)
SODIUM SERPL-SCNC: 140 MMOL/L (ref 136–145)
WBC NRBC COR # BLD: 6.57 10*3/MM3 (ref 3.4–10.8)

## 2023-09-20 PROCEDURE — 93010 ELECTROCARDIOGRAM REPORT: CPT | Performed by: INTERNAL MEDICINE

## 2023-09-20 PROCEDURE — 93005 ELECTROCARDIOGRAM TRACING: CPT | Performed by: INTERNAL MEDICINE

## 2023-09-20 PROCEDURE — 85027 COMPLETE CBC AUTOMATED: CPT | Performed by: INTERNAL MEDICINE

## 2023-09-20 PROCEDURE — 99238 HOSP IP/OBS DSCHRG MGMT 30/<: CPT | Performed by: STUDENT IN AN ORGANIZED HEALTH CARE EDUCATION/TRAINING PROGRAM

## 2023-09-20 PROCEDURE — 82948 REAGENT STRIP/BLOOD GLUCOSE: CPT

## 2023-09-20 PROCEDURE — 80048 BASIC METABOLIC PNL TOTAL CA: CPT | Performed by: INTERNAL MEDICINE

## 2023-09-20 RX ORDER — ACETAMINOPHEN 325 MG/1
650 TABLET ORAL EVERY 6 HOURS PRN
Qty: 30 TABLET | Refills: 0 | Status: SHIPPED | OUTPATIENT
Start: 2023-09-20

## 2023-09-20 RX ADMIN — PANTOPRAZOLE SODIUM 40 MG: 40 TABLET, DELAYED RELEASE ORAL at 06:55

## 2023-09-20 RX ADMIN — PRASUGREL 10 MG: 10 TABLET, FILM COATED ORAL at 08:35

## 2023-09-20 RX ADMIN — ASPIRIN 81 MG: 81 TABLET, COATED ORAL at 08:35

## 2023-09-20 RX ADMIN — LISINOPRIL 10 MG: 10 TABLET ORAL at 08:35

## 2023-09-20 NOTE — DISCHARGE SUMMARY
[facility] Discharge Summary      Patient Name: Sadiq Aldana  Age/Sex: 62 y.o. male  : 1960  MRN: [id number]    Encounter Provider: Devon Parker MD  Referring Provider: No ref. provider found  Place of Service: [facility] CARDIOLOGY  Patient Care Team:  Ofelia Hernandez APRN as PCP - General (Family Medicine)         Date of Discharge:  2023     Date of Admit: 2023      Discharge Diagnosis:   Chest pain    Unstable angina        Hospital Course: 62 year old man with CAD S/P PCI of distal RCA and prox CODY in 2023, DM2, HLD, GERD, HTN, and MIGUEL who presented to the ER on 2023 with complaints of persistent chest pain since last Friday, found to have EKG at baseline with indeterminate troponins of 47-48.  Given progressive symptoms, he was referred for left heart catheterization which revealed severe distal RCA stenosis just before the prior RPL stent.  He received a drug-eluting stent to this.  He remained chest pain-free overnight.  He was discharged home on aspirin, Effient, atorvastatin, low-dose metoprolol.    Patient was insistent on driving himself home as he did not have a ride.      Vitals:    23 0753   BP: 131/73   Pulse: 92   Resp: 16   Temp: 98.1 °F (36.7 °C)   SpO2:      Physical Exam:  GEN: no distress, alert and oriented  HEENT: NACT, EOMI, moist mucous membranes  Lungs: CTAB, no wheezes, rales or rhonchi  CV: normal rate, regular rhythm, normal S1, S2, no murmurs, right radial artery access site CDI, no hematoma, distal pulse 2+  Abdomen: soft, nontender, nondistended, NABS  Extremities: no edema  Skin: no rash, warm, dry  Heme/Lymph: no bruising  Psych: organized thought, normal behavior and affect     Procedures Performed:  Procedure(s):  Left Heart Cath  Coronary angiography  Percutaneous Coronary Intervention  Optical Coherence Tomography  Stent EUGENIO coronary         Consults:  Consults       Date and Time Order Name  Status Description    9/18/2023  4:31 PM Inpatient Cardiology Consult Completed             Pertinent Test Results:    Results from last 7 days   Lab Units 09/20/23  0254 09/19/23  0426 09/18/23  1545   SODIUM mmol/L 140 141 137   POTASSIUM mmol/L 4.0 3.9 4.4   CHLORIDE mmol/L 105 106 102   CO2 mmol/L 23.6 25.1 21.0*   BUN mg/dL 14 15 18   CREATININE mg/dL 0.99 0.90 1.13   GLUCOSE mg/dL 111* 107* 264*   CALCIUM mg/dL 9.2 8.8 8.9       Results from last 7 days   Lab Units 09/19/23  0426 09/18/23  1713 09/18/23  1545   HSTROP T ng/L 48* 47* 45*     Results from last 7 days   Lab Units 09/20/23  0254 09/19/23  0426 09/18/23  1545   WBC 10*3/mm3 6.57 5.14 6.32   HEMOGLOBIN g/dL 14.2 13.5 13.6   HEMATOCRIT % 41.3 39.9 40.0   PLATELETS 10*3/mm3 306 281 287     Results from last 7 days   Lab Units 09/18/23  1545   INR  0.97   APTT seconds 26.3     Results from last 7 days   Lab Units 09/19/23  0426   MAGNESIUM mg/dL 1.9     Results from last 7 days   Lab Units 09/18/23  1545   CHOLESTEROL mg/dL 133   TRIGLYCERIDES mg/dL 134   HDL CHOL mg/dL 43                   Discharge Medications     Your medication list        START taking these medications        Instructions Last Dose Given Next Dose Due   acetaminophen 325 MG tablet  Commonly known as: TYLENOL      Take 2 tablets by mouth Every 6 (Six) Hours As Needed for Mild Pain.              CONTINUE taking these medications        Instructions Last Dose Given Next Dose Due   aspirin 81 MG EC tablet      Take 1 tablet by mouth Daily.       atorvastatin 40 MG tablet  Commonly known as: Lipitor      Take 1 tablet by mouth Daily.       cholecalciferol 10 MCG (400 UNIT) tablet  Commonly known as: VITAMIN D3      Take 1 tablet by mouth Daily.       esomeprazole 40 MG capsule  Commonly known as: nexIUM      Take 1 capsule by mouth Every Morning Before Breakfast.       fluticasone 50 MCG/ACT nasal spray  Commonly known as: FLONASE      2 sprays into the nostril(s) as directed by  provider Daily for 14 days.       FreeStyle Eulalio 14 Day Sensor misc      1 Units 3 (Three) Times a Day.       Jardiance 25 MG tablet tablet  Generic drug: empagliflozin      Take 1 tablet by mouth Daily.       lisinopril 10 MG tablet  Commonly known as: PRINIVIL,ZESTRIL      Take 1 tablet by mouth Daily.       metFORMIN 1000 MG tablet  Commonly known as: GLUCOPHAGE      Take 1 tablet by mouth 2 (Two) Times a Day With Meals.       metoprolol tartrate 25 MG tablet  Commonly known as: LOPRESSOR      Take 0.5 tablets by mouth Every 12 (Twelve) Hours.       ondansetron 4 MG tablet  Commonly known as: Zofran      Take 1 tablet by mouth Every 8 (Eight) Hours As Needed for Nausea or Vomiting.       prasugrel 10 MG tablet  Commonly known as: EFFIENT      Take 1 tablet by mouth Daily.       Rybelsus 7 MG tablet  Generic drug: Semaglutide      Take 7 mg by mouth Daily.       sildenafil 25 MG tablet  Commonly known as: VIAGRA      TAKE TWO TABLETS BY MOUTH DAILY AS NEEDED FOR ERECTILE DYSFUNCTION       Synjardy XR 12.5-1000 MG tablet sustained-release 24 hour  Generic drug: Empagliflozin-metFORMIN HCl ER      Take 1 tablet by mouth 2 (Two) Times a Day.       Tresiba FlexTouch 100 UNIT/ML solution pen-injector injection  Generic drug: insulin degludec      Inject 32 Units under the skin into the appropriate area as directed Every Night. Doing in am now                 Where to Get Your Medications        These medications were sent to [facility] [id number] [place], [place] - [address] [facility] AT [facility] [phone] [facility] [phone] [address], [facility] [address]      Phone: [phone]   acetaminophen 325 MG tablet           Discharge Diet:   Dietary Orders (From admission, onward)       Start     Ordered    09/19/23 1930  Diet: Diabetic Diets; Consistent Carbohydrate; Texture: Regular Texture (IDDSI 7); Fluid Consistency: Thin (IDDSI 0)  Diet Effective Now        References:    Diet  Order [place]   Question Answer Comment   Diets: Diabetic Diets    Diabetic Diet: Consistent Carbohydrate    Texture: Regular Texture (IDDSI 7)    Fluid Consistency: Thin (IDDSI 0)        09/19/23 1929                        Discharge disposition: Home    Discharge condition: Stable      Follow-up Appointments  Future Appointments   Date Time Provider Department Center   9/22/2023  1:30 PM Ofelia Hernandez APRN [facility]   10/3/2023  3:15 PM Devon Cristobal MD [facility]   10/24/2023  4:30 PM [facility]   1/19/2024  2:30 PM Devon Cristobal MD [facility]      Follow-up Information       Ofelia Hernandez APRN .    Specialties: Family Medicine, Nurse Practitioner  Why: FRIDAY SEPTEMBER 22ND, 2023 AT 1:30 PM.   Please arrive 15 minutes prior to your appointment.  Contact information:  [address] [place], [place] [address] [phone]                               Test Results Pending at Discharge         Time:   I spent  < 30  minutes on this discharge activity which included: face-to-face encounter with the patient, reviewing the data in the system, coordination of the care with the nursing staff as well as consultants, documentation, and entering orders.        Devon Parker MD  [facility]  09/20/23  09:40 EDT

## 2023-09-20 NOTE — PLAN OF CARE
Goal Outcome Evaluation:              Outcome Evaluation: TR band off with dressing in place over site. Patient denies complaint/needs at this time. WCTM         Problem: Adult Inpatient Plan of Care  Goal: Plan of Care Review  Outcome: Ongoing, Progressing  Flowsheets  Taken 9/20/2023 0042 by Luis Fernando Helton RN  Outcome Evaluation: TR band off with dressing in place over site. Patient denies complaint/needs at this time. WCTM  Taken 9/19/2023 0534 by Nasrin Sharma, RN  Progress: improving  Plan of Care Reviewed With: patient  Goal: Patient-Specific Goal (Individualized)  Outcome: Ongoing, Progressing  Goal: Absence of Hospital-Acquired Illness or Injury  Outcome: Ongoing, Progressing  Intervention: Identify and Manage Fall Risk  Recent Flowsheet Documentation  Taken 9/20/2023 0000 by Luis Fernando Helton RN  Safety Promotion/Fall Prevention:   activity supervised   fall prevention program maintained   nonskid shoes/slippers when out of bed   safety round/check completed  Taken 9/19/2023 2200 by Luis Fernando Helton RN  Safety Promotion/Fall Prevention:   activity supervised   fall prevention program maintained   safety round/check completed  Taken 9/19/2023 2000 by Luis Fernando Helton RN  Safety Promotion/Fall Prevention:   activity supervised   fall prevention program maintained   safety round/check completed  Intervention: Prevent Skin Injury  Recent Flowsheet Documentation  Taken 9/20/2023 0000 by Luis Fernando Helton RN  Body Position:   position changed independently   supine, legs elevated  Taken 9/19/2023 2200 by Luis Fernando Helton RN  Body Position:   position changed independently   supine, legs elevated  Taken 9/19/2023 2000 by Luis Fernando Helton RN  Body Position:   position changed independently   sitting up in bed  Intervention: Prevent and Manage VTE (Venous Thromboembolism) Risk  Recent Flowsheet Documentation  Taken 9/20/2023 0000 by Luis Fernando Helton RN  VTE Prevention/Management:   bilateral    sequential compression devices off   patient refused intervention  Taken 9/19/2023 2000 by Luis Fernando Helton RN  Activity Management: up ad keysha  VTE Prevention/Management:   bilateral   sequential compression devices off   patient refused intervention  Intervention: Prevent Infection  Recent Flowsheet Documentation  Taken 9/20/2023 0000 by Luis Fernando Helton RN  Infection Prevention: rest/sleep promoted  Taken 9/19/2023 2200 by Luis Fernando Helton RN  Infection Prevention: rest/sleep promoted  Goal: Optimal Comfort and Wellbeing  Outcome: Ongoing, Progressing  Intervention: Provide Person-Centered Care  Recent Flowsheet Documentation  Taken 9/20/2023 0000 by Luis Fernando Helton RN  Trust Relationship/Rapport:   questions encouraged   reassurance provided  Taken 9/19/2023 2000 by Luis Fernando Helton RN  Trust Relationship/Rapport:   care explained   choices provided   emotional support provided   empathic listening provided   questions answered   questions encouraged   reassurance provided   thoughts/feelings acknowledged  Goal: Readiness for Transition of Care  Outcome: Ongoing, Progressing     Problem: Diabetes Comorbidity  Goal: Blood Glucose Level Within Targeted Range  Outcome: Ongoing, Progressing     Problem: Hypertension Comorbidity  Goal: Blood Pressure in Desired Range  Outcome: Ongoing, Progressing  Intervention: Maintain Blood Pressure Management  Recent Flowsheet Documentation  Taken 9/20/2023 0000 by Luis Fernando Helton RN  Medication Review/Management: medications reviewed  Taken 9/19/2023 2200 by Luis Fernando Helton RN  Medication Review/Management: medications reviewed  Taken 9/19/2023 2000 by Luis Fernando Helton RN  Medication Review/Management: medications reviewed     Problem: Chest Pain  Goal: Resolution of Chest Pain Symptoms  Outcome: Ongoing, Progressing     Problem: Fall Injury Risk  Goal: Absence of Fall and Fall-Related Injury  Outcome: Ongoing, Progressing  Intervention: Identify and Manage  Contributors  Recent Flowsheet Documentation  Taken 9/20/2023 0000 by Luis Fernando Helton, RN  Medication Review/Management: medications reviewed  Taken 9/19/2023 2200 by Luis Fernando Helton, RN  Medication Review/Management: medications reviewed  Taken 9/19/2023 2000 by Luis Fernando Helton, RN  Medication Review/Management: medications reviewed  Intervention: Promote Injury-Free Environment  Recent Flowsheet Documentation  Taken 9/20/2023 0000 by Luis Fernando Helton, RN  Safety Promotion/Fall Prevention:   activity supervised   fall prevention program maintained   nonskid shoes/slippers when out of bed   safety round/check completed  Taken 9/19/2023 2200 by Luis Fernando Helton RN  Safety Promotion/Fall Prevention:   activity supervised   fall prevention program maintained   safety round/check completed  Taken 9/19/2023 2000 by Luis Fernando Helton, RN  Safety Promotion/Fall Prevention:   activity supervised   fall prevention program maintained   safety round/check completed

## 2023-09-20 NOTE — NURSING NOTE
Patient states he does not have anyone to drive him home at discharge. He drove himself here in his only vehicle and is insisting on driving self home. Informed patient of policy and refused ride assistance. Dr Cristobal notified and is ok with pt driving self home.

## 2023-09-20 NOTE — OUTREACH NOTE
Prep Survey      Flowsheet Row Responses   [facility] patient discharged from? [place]   Is LACE score < 7 ? Yes   Eligibility [facility]   Date of Admission 09/18/23   Date of Discharge 09/20/23   Discharge Disposition Home or Self Care   Discharge diagnosis Chest pain- Left Heart Cath   Does the patient have one of the following disease processes/diagnoses(primary or secondary)? Other   Does the patient have Home health ordered? No   Is there a DME ordered? No   Prep survey completed? Yes            Rossy RIVERA - Registered Nurse

## 2023-09-21 ENCOUNTER — TRANSITIONAL CARE MANAGEMENT TELEPHONE ENCOUNTER (OUTPATIENT)
Dept: CALL CENTER | Facility: HOSPITAL | Age: 63
End: 2023-09-21
Payer: COMMERCIAL

## 2023-09-21 NOTE — OUTREACH NOTE
Call Center TCM Note      Flowsheet Row Responses   [facility] patient discharged from? [place]   Does the patient have one of the following disease processes/diagnoses(primary or secondary)? Other   TCM attempt successful? No   Unsuccessful attempts Attempt 2            Clara Lugo MA    9/21/2023, 15:46 EDT

## 2023-09-21 NOTE — OUTREACH NOTE
Call Center TCM Note      Flowsheet Row Responses   [facility] patient discharged from? [place]   Does the patient have one of the following disease processes/diagnoses(primary or secondary)? Other   TCM attempt successful? No   Unsuccessful attempts Attempt 1            Clara Lugo MA    9/21/2023, 11:01 EDT

## 2023-09-22 ENCOUNTER — OFFICE VISIT (OUTPATIENT)
Dept: FAMILY MEDICINE CLINIC | Facility: CLINIC | Age: 63
End: 2023-09-22
Payer: COMMERCIAL

## 2023-09-22 ENCOUNTER — TRANSITIONAL CARE MANAGEMENT TELEPHONE ENCOUNTER (OUTPATIENT)
Dept: CALL CENTER | Facility: HOSPITAL | Age: 63
End: 2023-09-22
Payer: COMMERCIAL

## 2023-09-22 VITALS
OXYGEN SATURATION: 97 % | BODY MASS INDEX: 28.95 KG/M2 | HEART RATE: 90 BPM | SYSTOLIC BLOOD PRESSURE: 112 MMHG | WEIGHT: 191 LBS | HEIGHT: 68 IN | TEMPERATURE: 98.6 F | RESPIRATION RATE: 16 BRPM | DIASTOLIC BLOOD PRESSURE: 62 MMHG

## 2023-09-22 DIAGNOSIS — E78.2 MIXED HYPERLIPIDEMIA: ICD-10-CM

## 2023-09-22 DIAGNOSIS — R07.89 CHEST DISCOMFORT: ICD-10-CM

## 2023-09-22 DIAGNOSIS — I10 PRIMARY HYPERTENSION: Primary | ICD-10-CM

## 2023-09-22 DIAGNOSIS — Z11.59 NEED FOR HEPATITIS C SCREENING TEST: ICD-10-CM

## 2023-09-22 DIAGNOSIS — E11.9 TYPE 2 DIABETES MELLITUS WITHOUT COMPLICATION, WITH LONG-TERM CURRENT USE OF INSULIN: ICD-10-CM

## 2023-09-22 DIAGNOSIS — Z79.4 TYPE 2 DIABETES MELLITUS WITHOUT COMPLICATION, WITH LONG-TERM CURRENT USE OF INSULIN: ICD-10-CM

## 2023-09-22 RX ORDER — DULAGLUTIDE 3 MG/.5ML
0.5 INJECTION, SOLUTION SUBCUTANEOUS WEEKLY
Qty: 12 ML | Refills: 5 | Status: SHIPPED | OUTPATIENT
Start: 2023-09-22

## 2023-09-22 RX ORDER — PANTOPRAZOLE SODIUM 40 MG/1
TABLET, DELAYED RELEASE ORAL
COMMUNITY
Start: 2023-09-17 | End: 2023-09-22

## 2023-09-22 RX ORDER — EMPAGLIFLOZIN, METFORMIN HYDROCHLORIDE 12.5; 1 MG/1; MG/1
1 TABLET, EXTENDED RELEASE ORAL
Qty: 180 TABLET | Refills: 1 | Status: SHIPPED | OUTPATIENT
Start: 2023-09-22

## 2023-09-22 NOTE — PROGRESS NOTES
Subjective   Sadiq Aldana is a 62 y.o. male. Patient is here today for   Chief Complaint   Patient presents with    Hospital Follow Up Visit       (Not on file)-  Risk for Readmission (LACE) Score: 3 (9/20/2023  6:00 AM)           Vitals:    09/22/23 1256   BP: 112/62   Pulse: 90   Resp: 16   Temp: 98.6 °F (37 °C)   SpO2: 97%     The following portions of the patient's history were reviewed and updated as appropriate: allergies, current medications, past family history, past medical history, past social history, past surgical history and problem list.    Past Medical History:   Diagnosis Date    Cholelithiasis     Removed    Depression 6/22    Cwife    Diabetes mellitus     Erectile dysfunction     GERD (gastroesophageal reflux disease)     Hyperlipidemia     Hypertension     Obstructive sleep apnea syndrome     Peptic ulceration       No Known Allergies   Social History     Socioeconomic History    Marital status:    Tobacco Use    Smoking status: Never    Smokeless tobacco: Never   Vaping Use    Vaping Use: Never used   Substance and Sexual Activity    Alcohol use: Not Currently     Comment: Less than 2 a month    Drug use: Never    Sexual activity: Yes     Partners: Female     Birth control/protection: Condom, Pill, None        Current Outpatient Medications:     acetaminophen (TYLENOL) 325 MG tablet, Take 2 tablets by mouth Every 6 (Six) Hours As Needed for Mild Pain., Disp: 30 tablet, Rfl: 0    aspirin 81 MG EC tablet, Take 1 tablet by mouth Daily., Disp: 90 tablet, Rfl: 4    atorvastatin (Lipitor) 40 MG tablet, Take 1 tablet by mouth Daily., Disp: 30 tablet, Rfl: 5    cholecalciferol (VITAMIN D3) 10 MCG (400 UNIT) tablet, Take 1 tablet by mouth Daily., Disp: 90 tablet, Rfl: 4    Continuous Blood Gluc Sensor (FreeStyle Eulalio 14 Day Sensor) misc, 1 Units 3 (Three) Times a Day., Disp: 9 each, Rfl: 9    Empagliflozin-metFORMIN HCl ER (Synjardy XR) 12.5-1000 MG tablet sustained-release 24 hour, Take 1  tablet by mouth 2 (Two) Times a Day., Disp: 180 tablet, Rfl: 1    esomeprazole (nexIUM) 40 MG capsule, Take 1 capsule by mouth Every Morning Before Breakfast., Disp: 90 capsule, Rfl: 1    lisinopril (PRINIVIL,ZESTRIL) 10 MG tablet, Take 1 tablet by mouth Daily., Disp: 30 tablet, Rfl: 5    metoprolol tartrate (LOPRESSOR) 25 MG tablet, Take 0.5 tablets by mouth Every 12 (Twelve) Hours., Disp: 30 tablet, Rfl: 5    ondansetron (Zofran) 4 MG tablet, Take 1 tablet by mouth Every 8 (Eight) Hours As Needed for Nausea or Vomiting., Disp: 12 tablet, Rfl: 0    prasugrel (EFFIENT) 10 MG tablet, Take 1 tablet by mouth Daily., Disp: 30 tablet, Rfl: 11    sildenafil (VIAGRA) 25 MG tablet, TAKE TWO TABLETS BY MOUTH DAILY AS NEEDED FOR ERECTILE DYSFUNCTION, Disp: 90 tablet, Rfl: 0    Tresiba FlexTouch 100 UNIT/ML solution pen-injector injection, Inject 32 Units under the skin into the appropriate area as directed Every Night. Doing in am now, Disp: 200 mL, Rfl: 5    Dulaglutide (Trulicity) 3 MG/0.5ML solution pen-injector, Inject 0.08 mL under the skin into the appropriate area as directed 1 (One) Time Per Week., Disp: 12 mL, Rfl: 5     Objective     History of Present Illness  Patient here today for hospital follow up from [facility] from 9/18-9/20 for c/o chest pain for 4 days. Upon ER arrival EKG indeterminate, troponin's 47-48.  Patient hospitalized for left heart catheterization which revealed severe distal RCA stenosis just before the prior RPL stent.  He received a drug-eluting stent and then remained chest pain-free overnight.  He was discharged home on aspirin, Effient, atorvastatin, low-dose metoprolol.    Patient history of CAD S/P PCI of distal RCA and prox CODY in Jan 2023    Patient stating he has a follow up on Tuesday with cardiologist Dr. Cristobal.     Patient stating he is eating and drinking ok at home, drinking water, and denies any issues with bowels and bladder.     Patient stating his blood pressures have been  in  am, 175 before dinner and Over 200 after dinner. Patient requesting to go back on synjardy and Trulicity.      Review of Systems   Constitutional:  Positive for fatigue. Negative for fever.   Respiratory: Negative.  Negative for shortness of breath.    Cardiovascular:  Negative for chest pain, palpitations and leg swelling.        Aching feeling to upper chest sebas.   Gastrointestinal: Negative.    Genitourinary: Negative.    Neurological: Negative.  Negative for dizziness.     Physical Exam  Vitals reviewed.   HENT:      Head: Normocephalic.   Eyes:      Pupils: Pupils are equal, round, and reactive to light.   Cardiovascular:      Rate and Rhythm: Normal rate and regular rhythm.      Pulses: Normal pulses.   Pulmonary:      Effort: Pulmonary effort is normal. No respiratory distress.      Breath sounds: Normal breath sounds. No stridor. No wheezing, rhonchi or rales.   Chest:      Chest wall: No tenderness.   Musculoskeletal:         General: Normal range of motion.   Skin:     General: Skin is warm and dry.   Neurological:      Mental Status: He is alert and oriented to person, place, and time.   Psychiatric:         Behavior: Behavior normal.       ASSESSMENT     Problems Addressed this Visit          Cardiac and Vasculature    Mixed hyperlipidemia    Hypertension - Primary    Relevant Orders    CBC & Differential       Endocrine and Metabolic    Type 2 diabetes mellitus without complication, with long-term current use of insulin    Relevant Medications    Empagliflozin-metFORMIN HCl ER (Synjardy XR) 12.5-1000 MG tablet sustained-release 24 hour    Dulaglutide (Trulicity) 3 MG/0.5ML solution pen-injector    Other Relevant Orders    Comprehensive Metabolic Panel    Microalbumin / Creatinine Urine Ratio - Urine, Clean Catch    Hemoglobin A1c     Other Visit Diagnoses       Chest discomfort        Need for hepatitis C screening test        Relevant Orders    Hepatitis C antibody          Diagnoses         Codes  Comments    Primary hypertension    -  Primary ICD-10-CM: I10  ICD-9-CM: 401.9     Mixed hyperlipidemia     ICD-10-CM: E78.2  ICD-9-CM: 272.2     Type 2 diabetes mellitus without complication, with long-term current use of insulin     ICD-10-CM: E11.9, Z79.4  ICD-9-CM: 250.00, V58.67     Chest discomfort     ICD-10-CM: R07.89  ICD-9-CM: 786.59     Need for hepatitis C screening test     ICD-10-CM: Z11.59  ICD-9-CM: V73.89             Current outpatient and discharge medications have been reconciled for the patient.  Reviewed by: NEELA Champagne      PLAN    Patient Instructions   Hypertension: stable, continue metoprolol 25 mg 1/2 tab twice a day, lisinopril 10 mg 1 tab daily    Hyperlipidemia: stable as of 9/18 labs: total chol. 133, triglycerides 134, HDL 43, LDL 66. Continue atorvastatin 40 mg 1 tab daily     Diabetes: Patient requesting to switch back to Trulicity and synjardy. D/c'd Rybelsus, metformin and Jardiance and sent over script for Trulicity and synjardy. Patient to monitor blood sugar three times a day, rto in 1 month for fasting labs and then follow up appt to re-eval. Diabetes. Patient verb. Understanding.     Chest discomfort: keep cardiology appt on Tuesday, if symptoms worsen go to ER immediately.   Return if symptoms worsen or fail to improve, for fasting labs in 1 month, recheck in 1 month.

## 2023-09-22 NOTE — PATIENT INSTRUCTIONS
Hypertension: stable, continue metoprolol 25 mg 1/2 tab twice a day, lisinopril 10 mg 1 tab daily    Hyperlipidemia: stable as of 9/18 labs: total chol. 133, triglycerides 134, HDL 43, LDL 66. Continue atorvastatin 40 mg 1 tab daily     Diabetes: Patient requesting to switch back to Trulicity and synjardy. D/c'd Rybelsus, metformin and Jardiance and sent over script for Trulicity and synjardy. Patient to monitor blood sugar three times a day, rto in 1 month for fasting labs and then follow up appt to re-eval. Diabetes. Patient verb. Understanding.     Chest discomfort: keep cardiology appt on Tuesday, if symptoms worsen go to ER immediately.

## 2023-09-22 NOTE — OUTREACH NOTE
Call Center TCM Note      Flowsheet Row Responses   [facility] patient discharged from? [place]   Does the patient have one of the following disease processes/diagnoses(primary or secondary)? Other   TCM attempt successful? No   Unsuccessful attempts Attempt 3   Call Status Left message            Ena Post RN    9/22/2023, 10:26 EDT

## 2023-09-25 ENCOUNTER — TELEPHONE (OUTPATIENT)
Dept: FAMILY MEDICINE CLINIC | Facility: CLINIC | Age: 63
End: 2023-09-25

## 2023-09-25 NOTE — TELEPHONE ENCOUNTER
Caller: Sadiq Aldana    Relationship: Self    Best call back number: [phone]    What is the best time to reach you: ANYTIME     Who are you requesting to speak with (clinical staff, provider,  specific staff member): CLINICAL     What was the call regarding: PATIENT STATES HE WENT TO THE PHARMACY TO  HIS PRESCRIPTIONS AND WAS TOLD THAT THE SYNJARDY IS NEEDING A PRIOR AUTHORIZATION.    PATIENT IS REQUESTING A CALL BACK AFTER THE PRIOR AUTHORIZATION HAS BEEN PLACED.

## 2023-09-25 NOTE — PROGRESS NOTES
Subjective:     Encounter Date:01/24/2023      Patient ID: Sadiq Aldana is a 62 y.o. male.    Chief Complaint:  Follow up of CAD    HPI:   62 y.o. male with hypertension, hyperlipidemia, diabetes (A1C 8), CAD status post 3.0 x 28mm Xience Skypoint drug-eluting stent (postdilated with a 3.5 mm NC) to distal RCA/ostial PL on 1/11/23 and with recent admission for unstable angina with PCI to severe distal RCA stenosis who presents for follow-up.  Patient presented to the ER on 9/18/2023 with complaints of persistent chest pain since last Friday, found to have EKG at baseline with indeterminate troponins of 47-48.  Given progressive symptoms, he was referred for left heart catheterization which revealed severe distal RCA stenosis just before the prior RPL stent.  He received a drug-eluting stent to this.  He remained chest pain-free overnight.  He was discharged home on aspirin, Effient, atorvastatin, low-dose metoprolol. Since discharge he notes that he has felt more fatigued. He denies any chest pain or shortness of breath.      The following portions of the patient's history were reviewed and updated as appropriate: allergies, current medications, past family history, past medical history, past social history, past surgical history and problem list.     REVIEW OF SYSTEMS:   All systems reviewed.  Pertinent positives identified in HPI.  All other systems are negative.    Past Medical History:   Diagnosis Date    Cholelithiasis     Removed    Depression 6/22    Cwife    Diabetes mellitus     Erectile dysfunction     GERD (gastroesophageal reflux disease)     Hyperlipidemia     Hypertension     Obstructive sleep apnea syndrome     Peptic ulceration        Family History   Problem Relation Age of Onset    Cancer Mother     Heart disease Mother     Other Father     No Known Problems Sister     No Known Problems Sister     No Known Problems Sister     No Known Problems Sister     Cancer Sister     Other Sister      Other Sister     Cancer Brother         throat        Social History     Socioeconomic History    Marital status:    Tobacco Use    Smoking status: Never    Smokeless tobacco: Never   Vaping Use    Vaping Use: Never used   Substance and Sexual Activity    Alcohol use: Not Currently     Comment: Less than 2 a month    Drug use: Never    Sexual activity: Yes     Partners: Female     Birth control/protection: Condom, Pill, None       No Known Allergies    Past Surgical History:   Procedure Laterality Date    CARDIAC CATHETERIZATION N/A 01/11/2023    Procedure: Left Heart Cath;  Surgeon: Jacque Camp MD;  Location: [facility] CATH [facility];  Service: Cardiology;  Laterality: N/A;    CARDIAC CATHETERIZATION N/A 01/11/2023    Procedure: Coronary angiography;  Surgeon: Jacque Camp MD;  Location:  [facility] CATH INVASIVE LOCATION;  Service: Cardiology;  Laterality: N/A;    CARDIAC CATHETERIZATION N/A 01/11/2023    Procedure: Left ventriculography;  Surgeon: Jacque Camp MD;  [facility];  Service: Cardiology;  Laterality: N/A;    CARDIAC CATHETERIZATION N/A 01/11/2023    Procedure: Percutaneous Coronary Intervention;  Surgeon: Jacque Camp MD;  [facility]: [facility];  Service: Cardiology;  Laterality: N/A;    CARDIAC CATHETERIZATION N/A 01/11/2023    Procedure: Stent EUGENIO coronary;  Surgeon: Jacque Camp MD;  [facility]: [facility];  Service: Cardiology;  Laterality: N/A;    CARDIAC CATHETERIZATION N/A 9/19/2023    Procedure: Left Heart Cath;  Surgeon: Mary Orta MD;  [facility]: [facility];  Service: Cardiovascular;  Laterality: N/A;    CARDIAC CATHETERIZATION N/A 9/19/2023    Procedure: Coronary angiography;  Surgeon: Mary Orta MD;  [facility]: [facility];  Service: Cardiovascular;  Laterality: N/A;    CARDIAC CATHETERIZATION N/A 9/19/2023    Procedure: Percutaneous Coronary Intervention;   Surgeon: Mary Orta MD;  Location:  [facility] CATH INVASIVE LOCATION;  Service: Cardiovascular;  Laterality: N/A;    CARDIAC CATHETERIZATION N/A 9/19/2023    Procedure: Optical Coherence Tomography;  Surgeon: Mary Orta MD;  Location:  [facility] CATH INVASIVE LOCATION;  Service: Cardiovascular;  Laterality: N/A;    CARDIAC CATHETERIZATION N/A 9/19/2023    Procedure: Stent EUGENIO coronary;  Surgeon: Mary Orta MD;  Location:  [facility] CATH INVASIVE LOCATION;  Service: Cardiovascular;  Laterality: N/A;    CHOLECYSTECTOMY      CORONARY STENT PLACEMENT  1/112023    FRACTURE SURGERY      SHOULDER ARTHROSCOPY Bilateral     removal of bone spurs         ECG 12 Lead    Date/Time: 9/26/2023 3:37 PM  Performed by: Devon Cristobal MD  Authorized by: Devon Cristobal MD   Comparison: compared with previous ECG from 9/20/2023  Similar to previous ECG  Rhythm: sinus rhythm  Rate: normal  Conduction: conduction normal  Q waves: III and aVF    QRS axis: normal    Clinical impression: abnormal EKG and myocardial infarction           Objective:         Vitals:    09/26/23 1448   BP: 110/70   Pulse: 89   SpO2: 96%       PHYSICAL EXAM:  GEN: well appearing, in NAD   HEENT: NCAT, EOMI, moist mucus membranes   Respiratory: CTAB, no wheezes, rales or rhonchi  CV: normal rate, regular rhythm, normal S1, S2, no murmurs, rubs, gallops, right radial access site CDI, no hematoma, pulse 2+, mild ecchymosis over forearm  GI: soft, nontender, nondistended  MSK: no edema  Skin: no rash, warm, dry  Heme/Lymph: no bruising or bleeding  Psych: organized thought, normal behavior and affect  Neuro: Alert and Oriented x 3, grossly normal motor function        Assessment:         (I25.810) Coronary artery disease involving coronary bypass graft of native heart without angina pectoris - Plan: ECG 12 Lead    (I10) Primary hypertension    62 y.o. male with hypertension, hyperlipidemia, diabetes (A1C 8), CAD status post 3.0 x 28mm Xience Skypoint drug-eluting stent  (postdilated with a 3.5 mm NC) to distal RCA/ostial PL on 1/11/23 and with recent admission for unstable angina with PCI to severe distal RCA stenosis who presents for follow-up.          Plan:       #Coronary artery disease with recent PCI  Recent admission for unstable angina with PCI of the distal RCA lesion using a 3.5 x 18 mm Xience Skypoint drug-eluting stent at the proximal edge of the prior RCA stent from January 2023. Also with residual at least moderate proximal LAD disease.  - Continue aspirin 81 mg daily, prasugrel 10 mg daily, atorvastatin 40 mg daily  - Continue metoprolol 12.5 mg twice daily, lisinopril 10 mg daily  - given fatigue, can trial off metoprolol and re-assess. If persists, will consider nuclear stress test for further assessment of the residual LAD lesion    #Hypertension  -Continue metoprolol, lisinopril as above    #Hyperlipidemia  -Continue atorvastatin as above    Dr Bonner, thank you very much for referring this kind patient to me. Please call me with any questions or concerns. I will see the patient again in the office in 6 months or earlier as needed.         Devon Cristobal MD  09/26/23  [facility]    Outpatient Encounter Medications as of 9/26/2023   Medication Sig Dispense Refill    acetaminophen (TYLENOL) 325 MG tablet Take 2 tablets by mouth Every 6 (Six) Hours As Needed for Mild Pain. 30 tablet 0    aspirin 81 MG EC tablet Take 1 tablet by mouth Daily. 90 tablet 4    atorvastatin (Lipitor) 40 MG tablet Take 1 tablet by mouth Daily. 30 tablet 5    cholecalciferol (VITAMIN D3) 10 MCG (400 UNIT) tablet Take 1 tablet by mouth Daily. 90 tablet 4    Continuous Blood Gluc Sensor (FreeStyle Eulalio 14 Day Sensor) misc 1 Units 3 (Three) Times a Day. 9 each 9    Dulaglutide (Trulicity) 3 MG/0.5ML solution pen-injector Inject 0.08 mL under the skin into the appropriate area as directed 1 (One) Time Per Week. 12 mL 5    Empagliflozin-metFORMIN HCl ER (Synjardy XR) 12.5-1000 MG  tablet sustained-release 24 hour Take 1 tablet by mouth 2 (Two) Times a Day. 180 tablet 1    esomeprazole (nexIUM) 40 MG capsule Take 1 capsule by mouth Every Morning Before Breakfast. 90 capsule 1    lisinopril (PRINIVIL,ZESTRIL) 10 MG tablet Take 1 tablet by mouth Daily. 30 tablet 5    metoprolol tartrate (LOPRESSOR) 25 MG tablet Take 0.5 tablets by mouth Every 12 (Twelve) Hours. 30 tablet 5    ondansetron (Zofran) 4 MG tablet Take 1 tablet by mouth Every 8 (Eight) Hours As Needed for Nausea or Vomiting. 12 tablet 0    prasugrel (EFFIENT) 10 MG tablet Take 1 tablet by mouth Daily. 30 tablet 11    sildenafil (VIAGRA) 25 MG tablet TAKE TWO TABLETS BY MOUTH DAILY AS NEEDED FOR ERECTILE DYSFUNCTION 90 tablet 0    Tresiba FlexTouch 100 UNIT/ML solution pen-injector injection Inject 32 Units under the skin into the appropriate area as directed Every Night. Doing in am now 200 mL 5     No facility-administered encounter medications on file as of 9/26/2023.

## 2023-09-26 ENCOUNTER — OFFICE VISIT (OUTPATIENT)
Dept: CARDIOLOGY | Facility: CLINIC | Age: 63
End: 2023-09-26
Payer: COMMERCIAL

## 2023-09-26 VITALS
OXYGEN SATURATION: 96 % | WEIGHT: 197 LBS | HEIGHT: 68 IN | HEART RATE: 89 BPM | BODY MASS INDEX: 29.86 KG/M2 | SYSTOLIC BLOOD PRESSURE: 110 MMHG | DIASTOLIC BLOOD PRESSURE: 70 MMHG

## 2023-09-26 DIAGNOSIS — I25.810 CORONARY ARTERY DISEASE INVOLVING CORONARY BYPASS GRAFT OF NATIVE HEART WITHOUT ANGINA PECTORIS: Primary | ICD-10-CM

## 2023-09-26 DIAGNOSIS — I10 PRIMARY HYPERTENSION: ICD-10-CM

## 2023-10-09 DIAGNOSIS — E11.9 TYPE 2 DIABETES MELLITUS WITHOUT COMPLICATION, WITH LONG-TERM CURRENT USE OF INSULIN: ICD-10-CM

## 2023-10-09 DIAGNOSIS — Z79.4 TYPE 2 DIABETES MELLITUS WITHOUT COMPLICATION, WITH LONG-TERM CURRENT USE OF INSULIN: ICD-10-CM

## 2023-10-09 RX ORDER — FLASH GLUCOSE SENSOR
KIT MISCELLANEOUS
Qty: 2 EACH | Refills: 2 | Status: SHIPPED | OUTPATIENT
Start: 2023-10-09

## 2023-10-09 RX ORDER — SILDENAFIL 25 MG/1
TABLET, FILM COATED ORAL
Qty: 90 TABLET | Refills: 0 | Status: SHIPPED | OUTPATIENT
Start: 2023-10-09

## 2023-10-24 ENCOUNTER — HOSPITAL ENCOUNTER (OUTPATIENT)
Dept: CT IMAGING | Facility: HOSPITAL | Age: 63
Discharge: HOME | End: 2023-10-24
Admitting: NURSE PRACTITIONER
Payer: COMMERCIAL

## 2023-10-24 DIAGNOSIS — R91.1 PULMONARY NODULE: ICD-10-CM

## 2023-10-24 LAB — CREAT BLDA-MCNC: 1 MG/DL (ref 0.6–1.3)

## 2023-10-24 PROCEDURE — 82565 ASSAY OF CREATININE: CPT

## 2023-10-24 PROCEDURE — 25510000001 IOPAMIDOL 61 % SOLUTION: Performed by: NURSE PRACTITIONER

## 2023-10-24 PROCEDURE — 71260 CT THORAX DX C+: CPT

## 2023-10-24 RX ADMIN — IOPAMIDOL 100 ML: 612 INJECTION, SOLUTION INTRAVENOUS at 16:29

## 2023-10-26 ENCOUNTER — TELEPHONE (OUTPATIENT)
Dept: FAMILY MEDICINE CLINIC | Facility: CLINIC | Age: 63
End: 2023-10-26
Payer: COMMERCIAL

## 2023-10-26 DIAGNOSIS — R91.1 PULMONARY NODULE: Primary | ICD-10-CM

## 2023-10-26 DIAGNOSIS — D73.89 LESION OF SPLEEN: ICD-10-CM

## 2023-10-26 DIAGNOSIS — K44.9 HIATAL HERNIA: ICD-10-CM

## 2023-10-26 DIAGNOSIS — N28.9 KIDNEY LESION: ICD-10-CM

## 2023-10-26 DIAGNOSIS — K76.0 HEPATIC STEATOSIS: ICD-10-CM

## 2023-10-26 DIAGNOSIS — R59.0 HILAR LYMPHADENOPATHY: ICD-10-CM

## 2023-10-26 NOTE — TELEPHONE ENCOUNTER
Imaging results called and discussed with Patient. Discussed treatment plan options; referring to pulmonologist for further eval. Patient verb. Understanding.

## 2023-10-27 DIAGNOSIS — R91.1 PULMONARY NODULE: Primary | ICD-10-CM

## 2023-10-27 DIAGNOSIS — R59.0 HILAR LYMPHADENOPATHY: ICD-10-CM

## 2023-10-30 ENCOUNTER — OFFICE VISIT (OUTPATIENT)
Dept: FAMILY MEDICINE CLINIC | Facility: CLINIC | Age: 63
End: 2023-10-30
Payer: COMMERCIAL

## 2023-10-30 VITALS
BODY MASS INDEX: 30.41 KG/M2 | SYSTOLIC BLOOD PRESSURE: 130 MMHG | HEIGHT: 68 IN | DIASTOLIC BLOOD PRESSURE: 72 MMHG | RESPIRATION RATE: 16 BRPM | OXYGEN SATURATION: 97 % | HEART RATE: 77 BPM | WEIGHT: 200.62 LBS | TEMPERATURE: 98 F

## 2023-10-30 DIAGNOSIS — E11.9 TYPE 2 DIABETES MELLITUS WITHOUT COMPLICATION, WITH LONG-TERM CURRENT USE OF INSULIN: Primary | ICD-10-CM

## 2023-10-30 DIAGNOSIS — I10 PRIMARY HYPERTENSION: ICD-10-CM

## 2023-10-30 DIAGNOSIS — Z79.4 TYPE 2 DIABETES MELLITUS WITHOUT COMPLICATION, WITH LONG-TERM CURRENT USE OF INSULIN: Primary | ICD-10-CM

## 2023-10-30 DIAGNOSIS — K44.9 HIATAL HERNIA: ICD-10-CM

## 2023-10-30 DIAGNOSIS — K21.9 GASTROESOPHAGEAL REFLUX DISEASE, UNSPECIFIED WHETHER ESOPHAGITIS PRESENT: ICD-10-CM

## 2023-10-30 RX ORDER — INSULIN DEGLUDEC INJECTION 100 U/ML
28 INJECTION, SOLUTION SUBCUTANEOUS NIGHTLY
Qty: 200 ML | Refills: 5 | Status: SHIPPED | OUTPATIENT
Start: 2023-10-30

## 2023-10-30 RX ORDER — INSULIN DEGLUDEC INJECTION 100 U/ML
32 INJECTION, SOLUTION SUBCUTANEOUS NIGHTLY
Qty: 200 ML | Refills: 5 | Status: SHIPPED | OUTPATIENT
Start: 2023-10-30 | End: 2023-10-30 | Stop reason: SDUPTHER

## 2023-10-30 RX ORDER — EMPAGLIFLOZIN, METFORMIN HYDROCHLORIDE 12.5; 1 MG/1; MG/1
1 TABLET, EXTENDED RELEASE ORAL
Qty: 180 TABLET | Refills: 1 | Status: SHIPPED | OUTPATIENT
Start: 2023-10-30

## 2023-10-30 RX ORDER — SUCRALFATE 1 G/1
1 TABLET ORAL 3 TIMES DAILY
Qty: 21 TABLET | Refills: 0 | Status: SHIPPED | OUTPATIENT
Start: 2023-10-30 | End: 2023-11-06

## 2023-10-30 NOTE — PROGRESS NOTES
Subjective     Sadiq Aldana is a 62 y.o.. male.     Patient here today for follow up on diabetes. Patient's recent HgA1c went from 8.6 to 7.9; glucose 104. Patient stating he is currently taking Trulicity 3 mg/0.5 ml  3 mg sq once a week, one syringe per week (four in a box);  Synjardy XR 12.5-1000 mg 1 tab twice a day and Tresiba 100 unit/ml 32 units q pm. Patient stating he checks his blood sugar 2-3 times a day. Patient stating he is averaging  in morning and 180-220 after meals.    Patient stating he is still having an upset stomach but it is better now; still with vomiting clear fluids at times. Patient stating he is eating somewhat healthy diet, not eating big portions; not eating a lot of fried/greasy, spicy, acidic foods; and drinking some water.        Diabetes  He has type 2 diabetes mellitus. MedicAlert identification noted. The initial diagnosis of diabetes was made 20 years ago. Hypoglycemia symptoms include sleepiness. Pertinent negatives for hypoglycemia include no confusion, dizziness, headaches, hunger, mood changes, nervousness/anxiousness, pallor, seizures, speech difficulty, sweats or tremors. Associated symptoms include chest pain (off and on), fatigue and foot paresthesias. Pertinent negatives for diabetes include no blurred vision, no foot ulcerations, no polydipsia, no polyphagia, no polyuria, no visual change, no weakness and no weight loss. Pertinent negatives for hypoglycemia complications include no blackouts, no hospitalization, no nocturnal hypoglycemia, no required assistance and no required glucagon injection. Symptoms are improving. Diabetic complications include impotence. Pertinent negatives for diabetic complications include no CVA, heart disease, nephropathy, peripheral neuropathy, PVD or retinopathy. Risk factors for coronary artery disease include hypertension, obesity and stress. Current diabetic treatment includes insulin injections and oral agent (monotherapy). He is  compliant with treatment most of the time. He is currently taking insulin pre-breakfast. Insulin injections are given by patient. Rotation sites for injection include the abdominal wall. His weight is fluctuating minimally. He is following a generally healthy diet. When asked about meal planning, he reported none. He has not had a previous visit with a dietitian. He participates in exercise daily. He monitors blood glucose at home 1-2 x per day. He monitors urine at home <1 x per month. Blood glucose monitoring compliance is adequate. His home blood glucose trend is decreasing steadily. His breakfast blood glucose is taken between 5-6 am. His breakfast blood glucose range is generally 70-90 mg/dl. His lunch blood glucose is taken between 12-1 pm. His lunch blood glucose range is generally 140-180 mg/dl. His dinner blood glucose is taken between 5-6 pm. His dinner blood glucose range is generally 180-200 mg/dl. He does not see a podiatrist.Eye exam is current.       The following portions of the patient's history were reviewed and updated as appropriate: allergies, current medications, past family history, past medical history, past social history, past surgical history and problem list.    Past Medical History:   Diagnosis Date    Cholelithiasis     Removed    Depression 6/22    Cwife    Diabetes mellitus     Erectile dysfunction     GERD (gastroesophageal reflux disease)     Hyperlipidemia     Hypertension     Kidney lesion 10/26/2023    Obstructive sleep apnea syndrome     Peptic ulceration        Past Surgical History:   Procedure Laterality Date    CARDIAC CATHETERIZATION N/A 01/11/2023    Procedure: Left Heart Cath;  Surgeon: Jacque Camp MD;  Location:  [facility];  Service: Cardiology;  Laterality: N/A;    CARDIAC CATHETERIZATION N/A 01/11/2023    Procedure: Coronary angiography;  Surgeon: Jacque Camp MD;  Location:  [facility];  Service: Cardiology;  Laterality:  N/A;    CARDIAC CATHETERIZATION N/A 01/11/2023    Procedure: Left ventriculography;  Surgeon: Jacque Camp MD;  Location: [facility];  Service: Cardiology;  Laterality: N/A;    CARDIAC CATHETERIZATION N/A 01/11/2023    Procedure: Percutaneous Coronary Intervention;  Surgeon: Jacque Cmap MD;  Location:  [facility];  Service: Cardiology;  Laterality: N/A;    CARDIAC CATHETERIZATION N/A 01/11/2023    Procedure: Stent EUGENIO coronary;  Surgeon: Jacque Camp MD;  [facility];  Service: Cardiology;  Laterality: N/A;    CARDIAC CATHETERIZATION N/A 9/19/2023    Procedure: Left Heart Cath;  Surgeon: Mary Orta MD;  [facility];  Service: Cardiovascular;  Laterality: N/A;    CARDIAC CATHETERIZATION N/A 9/19/2023    Procedure: Coronary angiography;  Surgeon: Mary Orta MD;  [facility];  Service: Cardiovascular;  Laterality: N/A;    CARDIAC CATHETERIZATION N/A 9/19/2023    Procedure: Percutaneous Coronary Intervention;  Surgeon: Mary Orta MD;  [facility];  Service: Cardiovascular;  Laterality: N/A;    CARDIAC CATHETERIZATION N/A 9/19/2023    Procedure: Optical Coherence Tomography;  Surgeon: Mary Orta MD;  [facility]: [facility];  Service: Cardiovascular;  Laterality: N/A;    CARDIAC CATHETERIZATION N/A 9/19/2023    Procedure: Stent EUGENIO coronary;  Surgeon: Mary Orta MD;  Location: [facility] CATH INVASIVE LOCATION;  Service: Cardiovascular;  Laterality: N/A;    CHOLECYSTECTOMY      CORONARY STENT PLACEMENT  1/112023    FRACTURE SURGERY      SHOULDER ARTHROSCOPY Bilateral     removal of bone spurs       Review of Systems   Constitutional:  Positive for fatigue. Negative for weight loss.   Eyes:  Negative for blurred vision.   Cardiovascular:  Positive for chest pain (off and on).   Endocrine: Negative for polydipsia, polyphagia and  "polyuria.   Genitourinary:  Positive for impotence.   Skin:  Negative for pallor.   Neurological:  Negative for dizziness, tremors, seizures, speech difficulty, weakness and headaches.   Psychiatric/Behavioral:  Negative for confusion. The patient is not nervous/anxious.        No Known Allergies    Objective     Vitals:    10/30/23 1534   BP: 130/72   Pulse: 77   Resp: 16   Temp: 98 °F (36.7 °C)   SpO2: 97%   Weight: 91 kg (200 lb 9.9 oz)   Height: 172.7 cm (67.99\")     Body mass index is 30.51 kg/m².    BMI is >= 30 and <35. (Class 1 Obesity). The following options were offered after discussion;: exercise counseling/recommendations and nutrition counseling/recommendations     Physical Exam  Vitals reviewed.   HENT:      Head: Normocephalic.   Eyes:      Pupils: Pupils are equal, round, and reactive to light.   Cardiovascular:      Rate and Rhythm: Normal rate and regular rhythm.   Pulmonary:      Effort: Pulmonary effort is normal.      Breath sounds: Normal breath sounds.   Musculoskeletal:         General: Normal range of motion.   Feet:      Comments: Rober. Heel and ankles with minor sensation loss with microfilament diabetes foot testing  Skin:     General: Skin is warm and dry.   Neurological:      Mental Status: He is alert and oriented to person, place, and time.   Psychiatric:         Behavior: Behavior normal.           Current Outpatient Medications:     acetaminophen (TYLENOL) 325 MG tablet, Take 2 tablets by mouth Every 6 (Six) Hours As Needed for Mild Pain., Disp: 30 tablet, Rfl: 0    aspirin 81 MG EC tablet, Take 1 tablet by mouth Daily., Disp: 90 tablet, Rfl: 4    atorvastatin (Lipitor) 40 MG tablet, Take 1 tablet by mouth Daily., Disp: 30 tablet, Rfl: 5    cholecalciferol (VITAMIN D3) 10 MCG (400 UNIT) tablet, Take 1 tablet by mouth Daily., Disp: 90 tablet, Rfl: 4    Continuous Blood Gluc Sensor (PeonutStyle Eulalio 14 Day Sensor) misc, USE SENSOR TO CHECK BLOOD SUGAR THREE TIMES A DAY. CHANGE EVERY 14 " DAYS., Disp: 2 each, Rfl: 2    Empagliflozin-metFORMIN HCl ER (Synjardy XR) 12.5-1000 MG tablet sustained-release 24 hour, Take 1 tablet by mouth 2 (Two) Times a Day., Disp: 180 tablet, Rfl: 1    esomeprazole (nexIUM) 40 MG capsule, Take 1 capsule by mouth Every Morning Before Breakfast., Disp: 90 capsule, Rfl: 1    lisinopril (PRINIVIL,ZESTRIL) 10 MG tablet, Take 1 tablet by mouth Daily., Disp: 30 tablet, Rfl: 5    metoprolol tartrate (LOPRESSOR) 25 MG tablet, Take 0.5 tablets by mouth Every 12 (Twelve) Hours., Disp: 30 tablet, Rfl: 5    ondansetron (Zofran) 4 MG tablet, Take 1 tablet by mouth Every 8 (Eight) Hours As Needed for Nausea or Vomiting., Disp: 12 tablet, Rfl: 0    prasugrel (EFFIENT) 10 MG tablet, Take 1 tablet by mouth Daily., Disp: 30 tablet, Rfl: 11    sildenafil (VIAGRA) 25 MG tablet, TAKE TWO TABLETS BY MOUTH DAILY AS NEEDED FOR FOR ERECTILE DYSFUNCTION, Disp: 90 tablet, Rfl: 0    Tresiba FlexTouch 100 UNIT/ML solution pen-injector injection, Inject 28 Units under the skin into the appropriate area as directed Every Night. Doing in am now, Disp: 200 mL, Rfl: 5    Dulaglutide 4.5 MG/0.5ML solution pen-injector, Inject 0.5 mL under the skin into the appropriate area as directed 1 (One) Time Per Week., Disp: 2 mL, Rfl: 5    sucralfate (Carafate) 1 g tablet, Take 1 tablet by mouth 3 (Three) Times a Day for 7 days., Disp: 21 tablet, Rfl: 0    Recent Results (from the past 336 hour(s))   CBC & Differential    Collection Time: 10/21/23  8:39 AM    Specimen: Blood   Result Value Ref Range    WBC 7.3 3.4 - 10.8 x10E3/uL    RBC 4.59 4.14 - 5.80 x10E6/uL    Hemoglobin 13.7 13.0 - 17.7 g/dL    Hematocrit 41.1 37.5 - 51.0 %    MCV 90 79 - 97 fL    MCH 29.8 26.6 - 33.0 pg    MCHC 33.3 31.5 - 35.7 g/dL    RDW 12.7 11.6 - 15.4 %    Platelets 294 150 - 450 x10E3/uL    Neutrophil Rel % 71 Not Estab. %    Lymphocyte Rel % 17 Not Estab. %    Monocyte Rel % 9 Not Estab. %    Eosinophil Rel % 2 Not Estab. %    Basophil  Rel % 1 Not Estab. %    Neutrophils Absolute 5.3 1.4 - 7.0 x10E3/uL    Lymphocytes Absolute 1.2 0.7 - 3.1 x10E3/uL    Monocytes Absolute 0.6 0.1 - 0.9 x10E3/uL    Eosinophils Absolute 0.1 0.0 - 0.4 x10E3/uL    Basophils Absolute 0.1 0.0 - 0.2 x10E3/uL    Immature Granulocyte Rel % 0 Not Estab. %    Immature Grans Absolute 0.0 0.0 - 0.1 x10E3/uL   Comprehensive Metabolic Panel    Collection Time: 10/21/23  8:39 AM    Specimen: Blood   Result Value Ref Range    Glucose 104 (H) 70 - 99 mg/dL    BUN 13 8 - 27 mg/dL    Creatinine 1.08 0.76 - 1.27 mg/dL    EGFR Result 78 >59 mL/min/1.73    BUN/Creatinine Ratio 12 10 - 24    Sodium 143 134 - 144 mmol/L    Potassium 4.6 3.5 - 5.2 mmol/L    Chloride 105 96 - 106 mmol/L    Total CO2 24 20 - 29 mmol/L    Calcium 9.2 8.6 - 10.2 mg/dL    Total Protein 6.0 6.0 - 8.5 g/dL    Albumin 4.1 3.9 - 4.9 g/dL    Globulin 1.9 1.5 - 4.5 g/dL    A/G Ratio 2.2 1.2 - 2.2    Total Bilirubin 0.5 0.0 - 1.2 mg/dL    Alkaline Phosphatase 73 44 - 121 IU/L    AST (SGOT) 21 0 - 40 IU/L    ALT (SGPT) 13 0 - 44 IU/L   Hemoglobin A1c    Collection Time: 10/21/23  8:39 AM    Specimen: Blood   Result Value Ref Range    Hemoglobin A1C 7.9 (H) 4.8 - 5.6 %   Hepatitis C antibody    Collection Time: 10/21/23  8:39 AM    Specimen: Blood   Result Value Ref Range    Hep C Virus Ab Non Reactive Non Reactive   Unable To Void    Collection Time: 10/21/23  8:39 AM   Result Value Ref Range    Unable to Void Comment    POC Creatinine    Collection Time: 10/24/23  4:19 PM    Specimen: Blood   Result Value Ref Range    Creatinine 1.00 0.60 - 1.30 mg/dL       CT Chest With Contrast Diagnostic  Narrative: CT CHEST WITH IV CONTRAST     HISTORY: Follow-up pulmonary nodule.     TECHNIQUE: Radiation dose reduction techniques were utilized, including  automated exposure control and exposure modulation based on body size.   3 mm images were obtained through the chest after the administration of  IV contrast.      COMPARISON:  01/10/2003, 09/19/2023     FINDINGS:     Thoracic inlet: Within normal noncontrast limits. Gynecomastia.     Heart and great vessels: The heart size is stable. Scattered calcific  atherosclerosis including coronary artery calcifications.     Lymphatics: Subcentimeter mediastinal nodes. Mildly prominent 1.2 cm  right hilar and 1 cm left hilar nodes.     Lung parenchyma and pleural space: Elevation of the right hemidiaphragm.  Patent central airway. Pleural and parenchymal scarring particularly in  the right middle and lower lobes. Stable 0.9 x 1.0 cm subpleural nodule  along the right hemidiaphragm (axial images 49-50). No new focal  consolidations, effusions, or pneumothorax.        Upper abdomen: Moderate diffuse hepatic steatosis. Cholecystectomy.  Peripherally calcified lesion in the spleen, unchanged. Indeterminate  hypodense lesion interpolar right kidney incompletely imaged similar to  the prior favoring a cyst but could be followed with dedicated imaging  or ultrasound as clinically indicated. Small hiatal hernia with  thickening of the distal esophagus.     Bone windows: Multilevel degenerative changes.        Impression:    1. Stable indeterminate 0.9 x 1 cm subpleural nodule in the right lower  lobe with mildly prominent hilar adenopathy. Management options include  3-month follow-up or PET/CT as differential considerations include both  infectious/inflammatory or neoplastic processes.  2. Please see above for additional findings/recommendations.     This report was finalized on 10/25/2023 6:40 PM by Dr. Meghan Adrian M.D  on Workstation: [id number]         Diagnoses and all orders for this visit:    1. Type 2 diabetes mellitus without complication, with long-term current use of insulin (Primary)  -     Empagliflozin-metFORMIN HCl ER (Synjardy XR) 12.5-1000 MG tablet sustained-release 24 hour; Take 1 tablet by mouth 2 (Two) Times a Day.  Dispense: 180 tablet; Refill: 1  -     Discontinue: Tresiba FlexTouch 100  UNIT/ML solution pen-injector injection; Inject 32 Units under the skin into the appropriate area as directed Every Night. Doing in am now  Dispense: 200 mL; Refill: 5  -     Dulaglutide 4.5 MG/0.5ML solution pen-injector; Inject 0.5 mL under the skin into the appropriate area as directed 1 (One) Time Per Week.  Dispense: 2 mL; Refill: 5  -     Tresiba FlexTouch 100 UNIT/ML solution pen-injector injection; Inject 28 Units under the skin into the appropriate area as directed Every Night. Doing in am now  Dispense: 200 mL; Refill: 5    2. Primary hypertension    3. Hiatal hernia  -     Ambulatory Referral to Gastroenterology    4. Gastroesophageal reflux disease, unspecified whether esophagitis present  -     sucralfate (Carafate) 1 g tablet; Take 1 tablet by mouth 3 (Three) Times a Day for 7 days.  Dispense: 21 tablet; Refill: 0        Patient Instructions   Diabetes: improvement in HgA1c to 7.9, wanting to get it below 7. Recommend Patient to keep working on a heart healthy low sugar diet, drink plenty of water and exercise 3-5 times a week for more than 30 minutes at a time. Increasing Trulicity from 3 to 4.5 mg per injection per week. Decreasing Tresiba from 32 to 28 units a day,  and continue Synjardy XR 12.5/1000 mg 1 tab twice a day.    Hypertension: stable, continue lisinopril 10 mg 1 tab daily and metoprolol 25 mg 1/2 tab twice a day; due to ongoing off and on chest pain recommend Patient to call his cardiologist and get his appt moved up and inform cardiologist of this on and off chest pain. Patient informed that if chest pain does not improve, worsens, if he gets shortness of breath, dizziness, nauseated, and/or having left arm, left neck, left jaw pain to go to ER immediately.     Hiatal Hernia/Gerd: referring to GI for further eval/tx. Patient to continue Nexium 40 mg daily, starting on a one week dose regimen of Carafate to see if improvement noted. Patient to continue to eat a low acidic  diet.      Return for fasting labs in 6 months, Annual physical.    Answers submitted by the patient for this visit:  Primary Reason for Visit (Submitted on 10/30/2023)  What is the primary reason for your visit?: Diabetes

## 2023-10-31 NOTE — PATIENT INSTRUCTIONS
Diabetes: improvement in HgA1c to 7.9, wanting to get it below 7. Recommend Patient to keep working on a heart healthy low sugar diet, drink plenty of water and exercise 3-5 times a week for more than 30 minutes at a time. Increasing Trulicity from 3 to 4.5 mg per injection per week. Decreasing Tresiba from 32 to 28 units a day,  and continue Synjardy XR 12.5/1000 mg 1 tab twice a day.    Hypertension: stable, continue lisinopril 10 mg 1 tab daily and metoprolol 25 mg 1/2 tab twice a day; due to ongoing off and on chest pain recommend Patient to call his cardiologist and get his appt moved up and inform cardiologist of this on and off chest pain. Patient informed that if chest pain does not improve, worsens, if he gets shortness of breath, dizziness, nauseated, and/or having left arm, left neck, left jaw pain to go to ER immediately.     Hiatal Hernia/Gerd: referring to GI for further eval/tx. Patient to continue Nexium 40 mg daily, starting on a one week dose regimen of Carafate to see if improvement noted. Patient to continue to eat a low acidic diet.

## 2023-11-02 DIAGNOSIS — R11.2 NAUSEA AND VOMITING, UNSPECIFIED VOMITING TYPE: ICD-10-CM

## 2023-11-02 RX ORDER — ONDANSETRON 4 MG/1
TABLET, FILM COATED ORAL
Qty: 12 TABLET | Refills: 0 | Status: SHIPPED | OUTPATIENT
Start: 2023-11-02

## 2023-11-07 ENCOUNTER — TELEPHONE (OUTPATIENT)
Dept: OTHER | Facility: HOSPITAL | Age: 63
End: 2023-11-07
Payer: COMMERCIAL

## 2023-11-07 NOTE — TELEPHONE ENCOUNTER
LVM for patient to return call. Needs to be scheduled for appointment.     Patient called back, stated that he already is scheduled with Pulmonary in Jan and would like to keep that appointment due to that he is going out of town for a month and that time would work better for him.

## 2023-11-26 DIAGNOSIS — I10 PRIMARY HYPERTENSION: ICD-10-CM

## 2023-11-27 RX ORDER — LISINOPRIL 10 MG/1
10 TABLET ORAL DAILY
Qty: 30 TABLET | Refills: 5 | Status: SHIPPED | OUTPATIENT
Start: 2023-11-27

## 2023-11-30 DIAGNOSIS — E78.2 MIXED HYPERLIPIDEMIA: ICD-10-CM

## 2023-11-30 RX ORDER — ATORVASTATIN CALCIUM 40 MG/1
40 TABLET, FILM COATED ORAL DAILY
Qty: 30 TABLET | Refills: 5 | Status: SHIPPED | OUTPATIENT
Start: 2023-11-30

## 2023-11-30 NOTE — TELEPHONE ENCOUNTER
Rx Refill Note  Requested Prescriptions     Pending Prescriptions Disp Refills    atorvastatin (Lipitor) 40 MG tablet 30 tablet 5     Sig: Take 1 tablet by mouth Daily.      Last office visit with prescribing clinician: 10/30/2023   Last telemedicine visit with prescribing clinician: Visit date not found   Next office visit with prescribing clinician: Visit date not found                         Would you like a call back once the refill request has been completed: [] Yes [] No    If the office needs to give you a call back, can they leave a voicemail: [] Yes [] No    Bobbi Cordoba MA  11/30/23, 08:15 EST

## 2023-12-04 ENCOUNTER — TELEPHONE (OUTPATIENT)
Dept: CARDIOLOGY | Facility: CLINIC | Age: 63
End: 2023-12-04

## 2023-12-04 NOTE — TELEPHONE ENCOUNTER
Caller: Sadiq Aldana     Relationship: SELF    Best call back number: [phone]    What is your medical concern? PATIENT WAS INSTRUCTED TO CALL DR. JACKSON AFTER HE GOT BACK FROM OVER [facility] IF HE WAS STILL EXPERIENCING ISSUES     How long has this issue been going on?  4-5 MONTHS    Is your provider already aware of this issue? YES    Have you been treated for this issue? YES

## 2023-12-05 DIAGNOSIS — R07.89 OTHER CHEST PAIN: Primary | ICD-10-CM

## 2023-12-05 DIAGNOSIS — I25.10 LAD STENOSIS: ICD-10-CM

## 2023-12-07 ENCOUNTER — PREP FOR SURGERY (OUTPATIENT)
Dept: SURGERY | Facility: SURGERY CENTER | Age: 63
End: 2023-12-07
Payer: COMMERCIAL

## 2023-12-07 ENCOUNTER — OFFICE VISIT (OUTPATIENT)
Dept: GASTROENTEROLOGY | Facility: CLINIC | Age: 63
End: 2023-12-07
Payer: COMMERCIAL

## 2023-12-07 VITALS
WEIGHT: 197.3 LBS | HEIGHT: 68 IN | HEART RATE: 97 BPM | SYSTOLIC BLOOD PRESSURE: 120 MMHG | DIASTOLIC BLOOD PRESSURE: 72 MMHG | OXYGEN SATURATION: 97 % | TEMPERATURE: 97.5 F | BODY MASS INDEX: 29.9 KG/M2

## 2023-12-07 DIAGNOSIS — K44.9 HIATAL HERNIA: Primary | Chronic | ICD-10-CM

## 2023-12-07 DIAGNOSIS — Z12.11 COLON CANCER SCREENING: ICD-10-CM

## 2023-12-07 DIAGNOSIS — K44.9 HIATAL HERNIA: Primary | ICD-10-CM

## 2023-12-07 DIAGNOSIS — K21.9 GASTROESOPHAGEAL REFLUX DISEASE, UNSPECIFIED WHETHER ESOPHAGITIS PRESENT: Chronic | ICD-10-CM

## 2023-12-07 DIAGNOSIS — K21.9 GASTROESOPHAGEAL REFLUX DISEASE, UNSPECIFIED WHETHER ESOPHAGITIS PRESENT: ICD-10-CM

## 2023-12-07 RX ORDER — SODIUM CHLORIDE, SODIUM LACTATE, POTASSIUM CHLORIDE, CALCIUM CHLORIDE 600; 310; 30; 20 MG/100ML; MG/100ML; MG/100ML; MG/100ML
30 INJECTION, SOLUTION INTRAVENOUS CONTINUOUS PRN
OUTPATIENT
Start: 2023-12-07

## 2023-12-07 RX ORDER — SODIUM CHLORIDE 0.9 % (FLUSH) 0.9 %
10 SYRINGE (ML) INJECTION AS NEEDED
OUTPATIENT
Start: 2023-12-07

## 2023-12-07 RX ORDER — ESOMEPRAZOLE MAGNESIUM 40 MG/1
40 CAPSULE, DELAYED RELEASE ORAL 2 TIMES DAILY
Qty: 180 CAPSULE | Refills: 3 | Status: SHIPPED | OUTPATIENT
Start: 2023-12-07

## 2023-12-07 RX ORDER — SODIUM CHLORIDE 0.9 % (FLUSH) 0.9 %
3 SYRINGE (ML) INJECTION EVERY 12 HOURS SCHEDULED
OUTPATIENT
Start: 2023-12-07

## 2023-12-07 NOTE — PROGRESS NOTES
"Chief Complaint   Patient presents with    hiatal hernia         History of Present Illness  62-year-old male presents the office today for evaluation of hiatal hernia.  He has a history of GERD and takes esomeprazole 40 mg once daily. He reports having a recent CT scan     He has a history of GERD and takes esomeprazole 40 mg once daily. He takes this at night time. Symptoms occur 3-4 days a week during the day. Previously he was waking up with acid reflux into his throat. He previously took pantoprazole 40 mg twice daily. He also felt that metformin aggravated his GERD and has since been placed on combination therapy. Spicy foods, carrot cake, and BBQ can trigger symptoms. He repots a cholecystectomy over 30 yrs ago. He denies dysphagia. He has occasional nausea, rare emesis when he eats meat. He denies any tick bites. Alpha Gal panel normal. He has never had an EGD.     Last colonoscopy was performed less than 10 yrs ago. He denies any personal history of colon polyps or family history of colon cancer. He reports mostly regular stools which seem to have improved since starting Metformin. He does not take a fiber supplement. He used to take a probiotic.     He reports that he had a CT scan January 2023. He underwent Ct chest 10/24/2023 revealing a subpleural nodule in the RLL with mildly prominent hilar adenopathy. Recommend follow up imaging with PET/CT in 3 moths. CT of chest also revealed fatty liver, small hiatal hernia and thickening of the distal esophagus. CT angiogram chest on 1/10/2023 revealed a RLL 1.2 cm opacity; negative for PE.    Result Review :       XR Chest 1 View (09/18/2023 15:20)   Comprehensive Metabolic Panel (10/21/2023 08:39)   Hepatitis C antibody (10/21/2023 08:39)   Alpha-Gal, IgE, Serum (09/15/2023 15:13)   CT Chest With Contrast Diagnostic (10/24/2023 16:29)   CT Angiogram Chest (01/10/2023 20:12)   Vital Signs:   /72   Pulse 97   Temp 97.5 °F (36.4 °C)   Ht 172.7 cm (68\")   " Wt 89.5 kg (197 lb 4.8 oz)   SpO2 97%   BMI 30.00 kg/m²     Body mass index is 30 kg/m².     Physical Exam  Vitals reviewed.   Constitutional:       Appearance: Normal appearance.   Pulmonary:      Effort: Pulmonary effort is normal. No respiratory distress.   Abdominal:      General: Abdomen is flat. Bowel sounds are normal. There is no distension.      Palpations: Abdomen is soft. There is no mass.      Tenderness: There is no abdominal tenderness. There is no guarding.   Musculoskeletal:         General: Normal range of motion.   Skin:     General: Skin is warm and dry.   Neurological:      General: No focal deficit present.      Mental Status: He is alert and oriented to person, place, and time.   Psychiatric:         Mood and Affect: Mood normal.         Thought Content: Thought content normal.         Judgment: Judgment normal.       Assessment and Plan    Diagnoses and all orders for this visit:    1. Hiatal hernia (Primary)    2. Gastroesophageal reflux disease, unspecified whether esophagitis present  -     esomeprazole (nexIUM) 40 MG capsule; Take 1 capsule by mouth 2 (Two) Times a Day.  Dispense: 180 capsule; Refill: 3    3. Colon cancer screening           Patient Instructions   For worsening GERD and abnormal CT scan finding of esophageal thickening and hiatal hernia we will increase your esomeprazole to 40 mg twice daily. We recommend that you take 1 tab each morning and your second dose 1-2 hours before your evening meals. New prescription has been sent to your pharmacy.     2.   We will schedule an EGD for further evaluation of upper GI symptoms and abnormal CT scan findings.     3.   For colon cancer screening we will schedule a colonoscopy.     4.   To help regulate bowel habits we recommend that you start a daily fiber supplement such as Benefiber; generic formula is also fine. We recommend that you start off with one serving per day and you may adjust your dosing based upon how your bowel  habits respond.     5.  Please be sure to follow-up with your PCP regarding findings on CT imaging of the right lower lobe lung nodule.  Radiology recommended a PET/CT scan in 3 months which would be due January 24, 2023.    6.   Plan for office follow up 4 weeks after procedures to discuss results and reassess symptoms.       Discussion:  Patient to continue daily PPI therapy we will proceed with EGD for further evaluation of abnormal CT scan findings demonstrate esophageal thickening and hiatal hernia as well as GERD.  Patient has never undergone EGD before.    For colon cancer screening we will schedule a colonoscopy.  We have recommended that he start a daily fiber supplement to help regulate bowel habits.    We have recommended he follow-up with his PCP office regarding findings on his CT imaging noting a RLL nodule.  PET/CT scan was recommended in 3 months for surveillance.  Plan for office follow-up 4 weeks after procedure to discuss results and reassess symptoms.  Patient verbalized understanding of above plan of care and is in agreement.  All questions answered and support provided.    EMR Dragon/Transcription Disclaimer:  This document has been Dictated utilizing Dragon dictation.

## 2023-12-08 ENCOUNTER — PATIENT ROUNDING (BHMG ONLY) (OUTPATIENT)
Dept: GASTROENTEROLOGY | Facility: CLINIC | Age: 63
End: 2023-12-08
Payer: COMMERCIAL

## 2023-12-10 NOTE — PROGRESS NOTES
[facility]  [address] [address] [phone].    Before we get started may I verify your date of birth? 1960    I am calling to officially welcome you to our practice and ask about your recent visit. Is this a good time to talk? yes    Tell me about your visit with us. What things went well?  All good!       We're always looking for ways to make our patients' experiences even better. Do you have recommendations on ways we may improve?  no    Overall were you satisfied with your first visit to our practice? yes       I appreciate you taking the time to speak with me today. Is there anything else I can do for you? no      Thank you, and have a great day.

## 2023-12-18 ENCOUNTER — APPOINTMENT (OUTPATIENT)
Dept: CT IMAGING | Facility: HOSPITAL | Age: 63
End: 2023-12-18
Payer: COMMERCIAL

## 2023-12-18 ENCOUNTER — TELEPHONE (OUTPATIENT)
Dept: GASTROENTEROLOGY | Facility: CLINIC | Age: 63
End: 2023-12-18

## 2023-12-18 ENCOUNTER — HOSPITAL ENCOUNTER (EMERGENCY)
Facility: HOSPITAL | Age: 63
Discharge: HOME | End: 2023-12-18
Attending: EMERGENCY MEDICINE | Admitting: EMERGENCY MEDICINE
Payer: COMMERCIAL

## 2023-12-18 VITALS
RESPIRATION RATE: 16 BRPM | SYSTOLIC BLOOD PRESSURE: 135 MMHG | OXYGEN SATURATION: 99 % | DIASTOLIC BLOOD PRESSURE: 72 MMHG | BODY MASS INDEX: 28.04 KG/M2 | HEART RATE: 74 BPM | WEIGHT: 185 LBS | TEMPERATURE: 98.9 F | HEIGHT: 68 IN

## 2023-12-18 DIAGNOSIS — J18.9 COMMUNITY ACQUIRED PNEUMONIA OF RIGHT LOWER LOBE OF LUNG: Primary | ICD-10-CM

## 2023-12-18 DIAGNOSIS — R10.13 EPIGASTRIC PAIN: ICD-10-CM

## 2023-12-18 LAB
ALBUMIN SERPL-MCNC: 4.8 G/DL (ref 3.5–5.2)
ALBUMIN/GLOB SERPL: 2.4 G/DL
ALP SERPL-CCNC: 82 U/L (ref 39–117)
ALT SERPL W P-5'-P-CCNC: 17 U/L (ref 1–41)
ANION GAP SERPL CALCULATED.3IONS-SCNC: 16 MMOL/L (ref 5–15)
AST SERPL-CCNC: 14 U/L (ref 1–40)
BASOPHILS # BLD AUTO: 0.06 10*3/MM3 (ref 0–0.2)
BASOPHILS NFR BLD AUTO: 0.5 % (ref 0–1.5)
BILIRUB SERPL-MCNC: 0.7 MG/DL (ref 0–1.2)
BUN SERPL-MCNC: 14 MG/DL (ref 8–23)
BUN/CREAT SERPL: 14.9 (ref 7–25)
CALCIUM SPEC-SCNC: 9.6 MG/DL (ref 8.6–10.5)
CHLORIDE SERPL-SCNC: 106 MMOL/L (ref 98–107)
CO2 SERPL-SCNC: 24 MMOL/L (ref 22–29)
CREAT SERPL-MCNC: 0.94 MG/DL (ref 0.76–1.27)
D-LACTATE SERPL-SCNC: 1.8 MMOL/L (ref 0.5–2)
DEPRECATED RDW RBC AUTO: 41.9 FL (ref 37–54)
EGFRCR SERPLBLD CKD-EPI 2021: 91.7 ML/MIN/1.73
EOSINOPHIL # BLD AUTO: 0.01 10*3/MM3 (ref 0–0.4)
EOSINOPHIL NFR BLD AUTO: 0.1 % (ref 0.3–6.2)
ERYTHROCYTE [DISTWIDTH] IN BLOOD BY AUTOMATED COUNT: 13.1 % (ref 12.3–15.4)
GLOBULIN UR ELPH-MCNC: 2 GM/DL
GLUCOSE SERPL-MCNC: 118 MG/DL (ref 65–99)
HCT VFR BLD AUTO: 44 % (ref 37.5–51)
HGB BLD-MCNC: 14.7 G/DL (ref 13–17.7)
HOLD SPECIMEN: NORMAL
HOLD SPECIMEN: NORMAL
IMM GRANULOCYTES # BLD AUTO: 0.05 10*3/MM3 (ref 0–0.05)
IMM GRANULOCYTES NFR BLD AUTO: 0.4 % (ref 0–0.5)
LIPASE SERPL-CCNC: 27 U/L (ref 13–60)
LYMPHOCYTES # BLD AUTO: 0.71 10*3/MM3 (ref 0.7–3.1)
LYMPHOCYTES NFR BLD AUTO: 5.6 % (ref 19.6–45.3)
MCH RBC QN AUTO: 29.5 PG (ref 26.6–33)
MCHC RBC AUTO-ENTMCNC: 33.4 G/DL (ref 31.5–35.7)
MCV RBC AUTO: 88.4 FL (ref 79–97)
MONOCYTES # BLD AUTO: 0.86 10*3/MM3 (ref 0.1–0.9)
MONOCYTES NFR BLD AUTO: 6.8 % (ref 5–12)
NEUTROPHILS NFR BLD AUTO: 10.89 10*3/MM3 (ref 1.7–7)
NEUTROPHILS NFR BLD AUTO: 86.6 % (ref 42.7–76)
NRBC BLD AUTO-RTO: 0 /100 WBC (ref 0–0.2)
PLATELET # BLD AUTO: 310 10*3/MM3 (ref 140–450)
PMV BLD AUTO: 8.9 FL (ref 6–12)
POTASSIUM SERPL-SCNC: 3.6 MMOL/L (ref 3.5–5.2)
PROT SERPL-MCNC: 6.8 G/DL (ref 6–8.5)
RBC # BLD AUTO: 4.98 10*6/MM3 (ref 4.14–5.8)
SODIUM SERPL-SCNC: 146 MMOL/L (ref 136–145)
WBC NRBC COR # BLD AUTO: 12.58 10*3/MM3 (ref 3.4–10.8)
WHOLE BLOOD HOLD COAG: NORMAL
WHOLE BLOOD HOLD SPECIMEN: NORMAL

## 2023-12-18 PROCEDURE — 83690 ASSAY OF LIPASE: CPT

## 2023-12-18 PROCEDURE — 74177 CT ABD & PELVIS W/CONTRAST: CPT

## 2023-12-18 PROCEDURE — 80053 COMPREHEN METABOLIC PANEL: CPT

## 2023-12-18 PROCEDURE — 83605 ASSAY OF LACTIC ACID: CPT

## 2023-12-18 PROCEDURE — 96374 THER/PROPH/DIAG INJ IV PUSH: CPT

## 2023-12-18 PROCEDURE — 85025 COMPLETE CBC W/AUTO DIFF WBC: CPT

## 2023-12-18 PROCEDURE — 25810000003 SODIUM CHLORIDE 0.9 % SOLUTION: Performed by: PHYSICIAN ASSISTANT

## 2023-12-18 PROCEDURE — 36415 COLL VENOUS BLD VENIPUNCTURE: CPT

## 2023-12-18 PROCEDURE — 25510000001 IOPAMIDOL 61 % SOLUTION: Performed by: EMERGENCY MEDICINE

## 2023-12-18 PROCEDURE — 99285 EMERGENCY DEPT VISIT HI MDM: CPT

## 2023-12-18 PROCEDURE — 25010000002 ONDANSETRON PER 1 MG: Performed by: PHYSICIAN ASSISTANT

## 2023-12-18 RX ORDER — ONDANSETRON 2 MG/ML
4 INJECTION INTRAMUSCULAR; INTRAVENOUS ONCE
Status: COMPLETED | OUTPATIENT
Start: 2023-12-18 | End: 2023-12-18

## 2023-12-18 RX ORDER — AMOXICILLIN AND CLAVULANATE POTASSIUM 875; 125 MG/1; MG/1
1 TABLET, FILM COATED ORAL EVERY 12 HOURS
Qty: 14 TABLET | Refills: 0 | Status: SHIPPED | OUTPATIENT
Start: 2023-12-18

## 2023-12-18 RX ORDER — AMOXICILLIN AND CLAVULANATE POTASSIUM 875; 125 MG/1; MG/1
1 TABLET, FILM COATED ORAL ONCE
Status: COMPLETED | OUTPATIENT
Start: 2023-12-18 | End: 2023-12-18

## 2023-12-18 RX ORDER — SODIUM CHLORIDE 0.9 % (FLUSH) 0.9 %
10 SYRINGE (ML) INJECTION AS NEEDED
Status: DISCONTINUED | OUTPATIENT
Start: 2023-12-18 | End: 2023-12-18 | Stop reason: HOSPADM

## 2023-12-18 RX ADMIN — AMOXICILLIN AND CLAVULANATE POTASSIUM 1 TABLET: 875; 125 TABLET, FILM COATED ORAL at 17:56

## 2023-12-18 RX ADMIN — ONDANSETRON HYDROCHLORIDE 4 MG: 2 INJECTION, SOLUTION INTRAMUSCULAR; INTRAVENOUS at 14:06

## 2023-12-18 RX ADMIN — SODIUM CHLORIDE 1000 ML: 9 INJECTION, SOLUTION INTRAVENOUS at 14:00

## 2023-12-18 RX ADMIN — IOPAMIDOL 100 ML: 612 INJECTION, SOLUTION INTRAVENOUS at 15:48

## 2023-12-18 NOTE — ED PROVIDER NOTES
MD ATTESTATION NOTE    The YESENIA and I have discussed this patient's history, physical exam, and treatment plan.  I have reviewed the documentation and personally had a face to face interaction with the patient. I affirm the documentation and agree with the treatment and plan.  The attached note describes my personal findings.    I provided a substantive portion of the care of this patient. I personally performed the physical exam, in its entirety.    Independent Historians: Patient    A complete HPI/ROS/PMH/PSH/SH/FH are unobtainable due to: Nothing    Chronic or social conditions impacting patient care (social determinants of health): None    Sadiq Aldana is a 62 y.o. male who presents to the ED c/o acute abdominal pain and vomiting.  The patient reports that he has a history of GERD and is followed by a gastroenterologist.  He reports that they have recommended a scope but is not yet been performed.  He reports that he developed epigastric abdominal pain as well as vomiting today.  He denies any fevers or chills.  He denies any shortness of breath.      Review of prior external notes (non-ED) -and- Review of prior external test results outside of this encounter: Laboratory evaluation 10/21/2023 shows normal CMP, normal CBC    Prescription drug monitoring program review:        On exam:  GENERAL: Awake, alert, no acute distress  SKIN: Warm, dry  HENT: Normocephalic, atraumatic  EYES: no scleral icterus  CV: regular rhythm, regular rate  RESPIRATORY: normal effort, lungs clear  ABDOMEN: soft, mild epigastric tenderness, nondistended  MUSCULOSKELETAL: no deformity  NEURO: alert, moves all extremities, follows commands                                                             Labs  Recent Results (from the past 24 hour(s))   Comprehensive Metabolic Panel    Collection Time: 12/18/23  1:01 PM    Specimen: Arm, Left; Blood   Result Value Ref Range    Glucose 118 (H) 65 - 99 mg/dL    BUN 14 8 - 23 mg/dL    Creatinine  0.94 0.76 - 1.27 mg/dL    Sodium 146 (H) 136 - 145 mmol/L    Potassium 3.6 3.5 - 5.2 mmol/L    Chloride 106 98 - 107 mmol/L    CO2 24.0 22.0 - 29.0 mmol/L    Calcium 9.6 8.6 - 10.5 mg/dL    Total Protein 6.8 6.0 - 8.5 g/dL    Albumin 4.8 3.5 - 5.2 g/dL    ALT (SGPT) 17 1 - 41 U/L    AST (SGOT) 14 1 - 40 U/L    Alkaline Phosphatase 82 39 - 117 U/L    Total Bilirubin 0.7 0.0 - 1.2 mg/dL    Globulin 2.0 gm/dL    A/G Ratio 2.4 g/dL    BUN/Creatinine Ratio 14.9 7.0 - 25.0    Anion Gap 16.0 (H) 5.0 - 15.0 mmol/L    eGFR 91.7 >60.0 mL/min/1.73   Lipase    Collection Time: 12/18/23  1:01 PM    Specimen: Arm, Left; Blood   Result Value Ref Range    Lipase 27 13 - 60 U/L   Lactic Acid, Plasma    Collection Time: 12/18/23  1:01 PM    Specimen: Arm, Left; Blood   Result Value Ref Range    Lactate 1.8 0.5 - 2.0 mmol/L   Green Top (Gel)    Collection Time: 12/18/23  1:01 PM   Result Value Ref Range    Extra Tube Hold for add-ons.    Lavender Top    Collection Time: 12/18/23  1:01 PM   Result Value Ref Range    Extra Tube hold for add-on    Gold Top - SST    Collection Time: 12/18/23  1:01 PM   Result Value Ref Range    Extra Tube Hold for add-ons.    Light Blue Top    Collection Time: 12/18/23  1:01 PM   Result Value Ref Range    Extra Tube Hold for add-ons.    CBC Auto Differential    Collection Time: 12/18/23  1:01 PM    Specimen: Arm, Left; Blood   Result Value Ref Range    WBC 12.58 (H) 3.40 - 10.80 10*3/mm3    RBC 4.98 4.14 - 5.80 10*6/mm3    Hemoglobin 14.7 13.0 - 17.7 g/dL    Hematocrit 44.0 37.5 - 51.0 %    MCV 88.4 79.0 - 97.0 fL    MCH 29.5 26.6 - 33.0 pg    MCHC 33.4 31.5 - 35.7 g/dL    RDW 13.1 12.3 - 15.4 %    RDW-SD 41.9 37.0 - 54.0 fl    MPV 8.9 6.0 - 12.0 fL    Platelets 310 140 - 450 10*3/mm3    Neutrophil % 86.6 (H) 42.7 - 76.0 %    Lymphocyte % 5.6 (L) 19.6 - 45.3 %    Monocyte % 6.8 5.0 - 12.0 %    Eosinophil % 0.1 (L) 0.3 - 6.2 %    Basophil % 0.5 0.0 - 1.5 %    Immature Grans % 0.4 0.0 - 0.5 %     Neutrophils, Absolute 10.89 (H) 1.70 - 7.00 10*3/mm3    Lymphocytes, Absolute 0.71 0.70 - 3.10 10*3/mm3    Monocytes, Absolute 0.86 0.10 - 0.90 10*3/mm3    Eosinophils, Absolute 0.01 0.00 - 0.40 10*3/mm3    Basophils, Absolute 0.06 0.00 - 0.20 10*3/mm3    Immature Grans, Absolute 0.05 0.00 - 0.05 10*3/mm3    nRBC 0.0 0.0 - 0.2 /100 WBC       Radiology  CT Abdomen Pelvis With Contrast    Result Date: 12/18/2023  CT ABDOMEN AND PELVIS WITH IV CONTRAST  HISTORY: 62-year-old male with abdominal pain and vomiting. Cholecystectomy in the past.  TECHNIQUE: Radiation dose reduction techniques were utilized, including automated exposure control and exposure modulation based on body size. 3 mm images were obtained through the abdomen and pelvis after the administration of IV contrast. No priors for comparison.  FINDINGS: The liver appears unremarkable and there is no biliary dilatation. The spleen, pancreas, adrenals, and kidneys appear unremarkable other than a 1.9 cm right renal cyst and a subcentimeter left renal cyst. There is no acute bowel abnormality. Appendix appears within normal limits. The prostate is enlarged and heterogeneous. Urinary bladder wall appears thickened, but is partially collapsed. There is no free fluid or lymphadenopathy. There is a very small right paramidline upper abdominal ventral hernia containing fat. At the visualized lower chest, there is a new reticular nodular and ground-glass opacity at the right lower lobe which likely represents bronchiolitis/developing pneumonia. The 1.1 cm right lower lobe pulmonary nodule is stable.      1. Acute right lower lobe airspace opacity likely represents developing pneumonia. Follow-up to complete resolution is recommended and referral to the multidisciplinary lung care clinic is recommended for the 1.1 cm right lower lobe pulmonary nodule. 2. Enlarged and heterogeneous prostate. PSA follow-up is recommended.       Medical Decision Making:  ED Course as of  12/18/23 1911   Mon Dec 18, 2023   1410 Patient presents with 2-day history of vomiting, epigastric abdominal pain.  Differential diagnoses include not limited to pancreatitis, gastritis, bowel obstruction. [EE]   1425 WBC(!): 12.58 [EE]   1425 Lipase: 27 [EE]   1425 Creatinine: 0.94 [EE]   1638 CT Abdomen Pelvis With Contrast  My independent interpretation of the CT of the abdomen pelvis is no bowel obstruction [TR]   1705 Updated patient on workup.  CT scan does show a right lower lobe infiltrate.  Patient states that he has been coughing over the past several days.  He is not hypoxic or in any respiratory distress.  I believe he is safe to discharge with antibiotics and outpatient treatment.  We will ensure that he can eat and drink before he leaves. [EE]   1751 Recheck of patient.  He is tolerating food and fluids.  We will discharge. [EE]      ED Course User Index  [EE] Bk Perez PA  [TR] Francisco J Santos MD       Clinical Scores:              The patient presents with acute epigastric abdominal pain and vomiting in the setting of a history of GERD.  Plan to obtain laboratory evaluation and CT imaging.  We will provide medication for nausea and reevaluate.  My differential diagnosis includes pancreatitis, acute coronary syndrome, GERD, esophagitis, ulcer, and others.    Procedures:  Procedures                  Diagnosis  Final diagnoses:   Community acquired pneumonia of right lower lobe of lung   Epigastric pain       Note Disclaimer: At [facility], we believe that sharing information builds trust and better relationships. You are receiving this note because you recently visited [facility]. It is possible you will see health information before a provider has talked with you about it. This kind of information can be easy to misunderstand. To help you fully understand what it means for your health, we urge you to discuss this note with your provider.       Francisco J Santos MD  12/18/23 1671        Francisco J Santos MD  12/18/23 1652       Francisco J Santos MD  12/18/23 1730       Francisco J Santos MD  12/18/23 1910

## 2023-12-18 NOTE — TELEPHONE ENCOUNTER
Caller: Sadiq Aldana    Relationship to patient: Self    Best call back number: [phone]     Patient is needing: PT IS ASKING FOR A CALL BACK. PT STATED THAT HE IS NOT ABLE TO KEEP ANYTHING DOWN AFTER EATING. PLEASE ADVISE.

## 2023-12-18 NOTE — TELEPHONE ENCOUNTER
RN called and discussed provider recommendations with patient. Pt reports he was at ER for further evaluation. EL

## 2023-12-18 NOTE — ED NOTES
Patient to ER via car from home for n/v x last night    Patient reports abd pain and headache    Patient reports abnormal EGD in Oct

## 2023-12-18 NOTE — TELEPHONE ENCOUNTER
RN called to obtain more information from patient. Pt reports that since 4 am this morning he has not been able to keep any food or drink down. Pt reports that he is having some nausea, but he is reporting hiccupping that is inconsistent. Pt reports that he cannot even keep water down. Reports he will drink it and and then have something similar to reflux/ hiccup and it comes back up into his mouth. Pt denies fever, chills, and diarrhea. Please advise. EL

## 2023-12-18 NOTE — ED PROVIDER NOTES
EMERGENCY DEPARTMENT ENCOUNTER    Room Number:  S04/04  Date of encounter:  12/18/2023  PCP: Ofelia Hernandez APRN  Historian: Patient  Chronic or social conditions impacting care (social determinants of health): Nothing    HPI:  Chief Complaint: Vomiting  A complete HPI/ROS/PMH/PSH/SH/FH are unobtainable due to: Nothing    Context: Sadiq Aldana is a 62 y.o. male who presents to the ED c/o a history of fairly abrupt onset abdominal pain, vomiting yesterday.  Patient localizes the pain to the epigastric area.  He has not been able to keep any food or fluids down.  Denies any fever, diarrhea.  He follows with GI for history of GERD, hiatal hernia.  He denies any ill contacts.    Review of prior external notes (non-ED):   I reviewed gastroenterology office visit from 12/7/2023.  Patient being followed for GERD.    Review of prior external test results outside of this encounter:  I reviewed a cardiac catheterization report from 1/11/2023.  Patient had 2 stents placed.  1 to the posterior descending branch, 1 in the right coronary artery.    PAST MEDICAL HISTORY  Active Ambulatory Problems     Diagnosis Date Noted    Type 2 diabetes mellitus without complication, with long-term current use of insulin 02/28/2020    Mixed hyperlipidemia 02/28/2020    Gastroesophageal reflux disease 02/28/2020    Acute left-sided low back pain with left-sided sciatica 06/03/2020    Routine adult health maintenance 01/22/2021    Artificial lens present 05/20/2021    Onychomycosis 06/02/2022    Hypertension 06/02/2022    Chest pain 01/10/2023    Unstable angina 01/10/2023    Chest pain, unspecified type 01/12/2023    Pulmonary nodule 10/26/2023    Hilar lymphadenopathy 10/26/2023    Hepatic steatosis 10/26/2023    Lesion of spleen 10/26/2023    Kidney lesion 10/26/2023    Hiatal hernia 10/26/2023     Resolved Ambulatory Problems     Diagnosis Date Noted    No Resolved Ambulatory Problems     Past Medical History:   Diagnosis Date     Cholelithiasis     Depression 6/22    Diabetes mellitus     Erectile dysfunction     GERD (gastroesophageal reflux disease)     Hyperlipidemia     Obstructive sleep apnea syndrome     Peptic ulceration          PAST SURGICAL HISTORY  Past Surgical History:   Procedure Laterality Date    CARDIAC CATHETERIZATION N/A 01/11/2023    Procedure: Left Heart Cath;  Surgeon: Jacque Camp MD;  Location:  [facility];  Service: Cardiology;  Laterality: N/A;    CARDIAC CATHETERIZATION N/A 01/11/2023    Procedure: Coronary angiography;  Surgeon: Jacque Camp MD;  Location:  [facility];  Service: Cardiology;  Laterality: N/A;    CARDIAC CATHETERIZATION N/A 01/11/2023    Procedure: Left ventriculography;  Surgeon: Jacque Camp MD;  Location:  [facility];  Service: Cardiology;  Laterality: N/A;    CARDIAC CATHETERIZATION N/A 01/11/2023    Procedure: Percutaneous Coronary Intervention;  Surgeon: Jacque Camp MD;  Location:  [facility];  Service: Cardiology;  Laterality: N/A;    CARDIAC CATHETERIZATION N/A 01/11/2023    Procedure: Stent EUGENIO coronary;  Surgeon: Jacque Camp MD;  Location:  [facility];  Service: Cardiology;  Laterality: N/A;    CARDIAC CATHETERIZATION N/A 09/19/2023    Procedure: Left Heart Cath;  Surgeon: Mary Orta MD;  [facility];  Service: Cardiovascular;  Laterality: N/A;    CARDIAC CATHETERIZATION N/A 09/19/2023    Procedure: Coronary angiography;  Surgeon: Mary Orta MD;  [facility];  Service: Cardiovascular;  Laterality: N/A;    CARDIAC CATHETERIZATION N/A 09/19/2023    Procedure: Percutaneous Coronary Intervention;  Surgeon: Mary Orta MD;  [facility];  Service: Cardiovascular;  Laterality: N/A;    CARDIAC CATHETERIZATION N/A 09/19/2023    Procedure: Optical Coherence Tomography;  Surgeon: Mary Orta  MD;  Location:  [facility];  Service: Cardiovascular;  Laterality: N/A;    CARDIAC CATHETERIZATION N/A 09/19/2023    Procedure: Stent EUGENIO coronary;  Surgeon: Mary Orta MD;  Location:  [facility];  Service: Cardiovascular;  Laterality: N/A;    CHOLECYSTECTOMY      COLONOSCOPY      CORONARY STENT PLACEMENT  1/[id number]    FRACTURE SURGERY      SHOULDER ARTHROSCOPY Bilateral     removal of bone spurs    UPPER GASTROINTESTINAL ENDOSCOPY           FAMILY HISTORY  Family History   Problem Relation Age of Onset    Cancer Mother     Heart disease Mother     Other Father     No Known Problems Sister     No Known Problems Sister     No Known Problems Sister     No Known Problems Sister     Cancer Sister     Other Sister     Other Sister     Cancer Brother         throat    Colon cancer Neg Hx     Colon polyps Neg Hx     Crohn's disease Neg Hx     Irritable bowel syndrome Neg Hx     Ulcerative colitis Neg Hx          SOCIAL HISTORY  Social History     Socioeconomic History    Marital status:    Tobacco Use    Smoking status: Never    Smokeless tobacco: Never   Vaping Use    Vaping Use: Never used   Substance and Sexual Activity    Alcohol use: Not Currently     Comment: Less than 2 a month    Drug use: Never    Sexual activity: Yes     Partners: Female     Birth control/protection: Condom, Pill, None         ALLERGIES  Patient has no known allergies.        REVIEW OF SYSTEMS  All systems reviewed and negative except for those discussed in HPI.       PHYSICAL EXAM    I have reviewed the triage vital signs and nursing notes.    ED Triage Vitals   Temp Heart Rate Resp BP SpO2   12/18/23 1223 12/18/23 1223 12/18/23 1223 12/18/23 1355 12/18/23 1223   98.9 °F (37.2 °C) 82 18 149/77 98 %      Temp src Heart Rate Source Patient Position BP Location FiO2 (%)   -- -- 12/18/23 1355 -- --     Sitting         Physical Exam  GENERAL: Alert, oriented, well-appearing, not distressed  HENT: head  atraumatic, no nuchal rigidity  EYES: no scleral icterus, EOMI  CV: regular rhythm, regular rate, no murmur  RESPIRATORY: normal effort, CTA  ABDOMEN: soft, mild epigastric tenderness  MUSCULOSKELETAL: no deformity, FROM, no calf swelling or tenderness  NEURO: alert, moves all extremities, follows commands  SKIN: warm, dry        LAB RESULTS  Recent Results (from the past 24 hour(s))   Comprehensive Metabolic Panel    Collection Time: 12/18/23  1:01 PM    Specimen: Arm, Left; Blood   Result Value Ref Range    Glucose 118 (H) 65 - 99 mg/dL    BUN 14 8 - 23 mg/dL    Creatinine 0.94 0.76 - 1.27 mg/dL    Sodium 146 (H) 136 - 145 mmol/L    Potassium 3.6 3.5 - 5.2 mmol/L    Chloride 106 98 - 107 mmol/L    CO2 24.0 22.0 - 29.0 mmol/L    Calcium 9.6 8.6 - 10.5 mg/dL    Total Protein 6.8 6.0 - 8.5 g/dL    Albumin 4.8 3.5 - 5.2 g/dL    ALT (SGPT) 17 1 - 41 U/L    AST (SGOT) 14 1 - 40 U/L    Alkaline Phosphatase 82 39 - 117 U/L    Total Bilirubin 0.7 0.0 - 1.2 mg/dL    Globulin 2.0 gm/dL    A/G Ratio 2.4 g/dL    BUN/Creatinine Ratio 14.9 7.0 - 25.0    Anion Gap 16.0 (H) 5.0 - 15.0 mmol/L    eGFR 91.7 >60.0 mL/min/1.73   Lipase    Collection Time: 12/18/23  1:01 PM    Specimen: Arm, Left; Blood   Result Value Ref Range    Lipase 27 13 - 60 U/L   Lactic Acid, Plasma    Collection Time: 12/18/23  1:01 PM    Specimen: Arm, Left; Blood   Result Value Ref Range    Lactate 1.8 0.5 - 2.0 mmol/L   Green Top (Gel)    Collection Time: 12/18/23  1:01 PM   Result Value Ref Range    Extra Tube Hold for add-ons.    Lavender Top    Collection Time: 12/18/23  1:01 PM   Result Value Ref Range    Extra Tube hold for add-on    Gold Top - SST    Collection Time: 12/18/23  1:01 PM   Result Value Ref Range    Extra Tube Hold for add-ons.    Light Blue Top    Collection Time: 12/18/23  1:01 PM   Result Value Ref Range    Extra Tube Hold for add-ons.    CBC Auto Differential    Collection Time: 12/18/23  1:01 PM    Specimen: Arm, Left; Blood   Result  Value Ref Range    WBC 12.58 (H) 3.40 - 10.80 10*3/mm3    RBC 4.98 4.14 - 5.80 10*6/mm3    Hemoglobin 14.7 13.0 - 17.7 g/dL    Hematocrit 44.0 37.5 - 51.0 %    MCV 88.4 79.0 - 97.0 fL    MCH 29.5 26.6 - 33.0 pg    MCHC 33.4 31.5 - 35.7 g/dL    RDW 13.1 12.3 - 15.4 %    RDW-SD 41.9 37.0 - 54.0 fl    MPV 8.9 6.0 - 12.0 fL    Platelets 310 140 - 450 10*3/mm3    Neutrophil % 86.6 (H) 42.7 - 76.0 %    Lymphocyte % 5.6 (L) 19.6 - 45.3 %    Monocyte % 6.8 5.0 - 12.0 %    Eosinophil % 0.1 (L) 0.3 - 6.2 %    Basophil % 0.5 0.0 - 1.5 %    Immature Grans % 0.4 0.0 - 0.5 %    Neutrophils, Absolute 10.89 (H) 1.70 - 7.00 10*3/mm3    Lymphocytes, Absolute 0.71 0.70 - 3.10 10*3/mm3    Monocytes, Absolute 0.86 0.10 - 0.90 10*3/mm3    Eosinophils, Absolute 0.01 0.00 - 0.40 10*3/mm3    Basophils, Absolute 0.06 0.00 - 0.20 10*3/mm3    Immature Grans, Absolute 0.05 0.00 - 0.05 10*3/mm3    nRBC 0.0 0.0 - 0.2 /100 WBC       Ordered the above labs and independently reviewed the results.        RADIOLOGY  CT Abdomen Pelvis With Contrast    Result Date: 12/18/2023  CT ABDOMEN AND PELVIS WITH IV CONTRAST  HISTORY: 62-year-old male with abdominal pain and vomiting. Cholecystectomy in the past.  TECHNIQUE: Radiation dose reduction techniques were utilized, including automated exposure control and exposure modulation based on body size. 3 mm images were obtained through the abdomen and pelvis after the administration of IV contrast. No priors for comparison.  FINDINGS: The liver appears unremarkable and there is no biliary dilatation. The spleen, pancreas, adrenals, and kidneys appear unremarkable other than a 1.9 cm right renal cyst and a subcentimeter left renal cyst. There is no acute bowel abnormality. Appendix appears within normal limits. The prostate is enlarged and heterogeneous. Urinary bladder wall appears thickened, but is partially collapsed. There is no free fluid or lymphadenopathy. There is a very small right paramidline upper  abdominal ventral hernia containing fat. At the visualized lower chest, there is a new reticular nodular and ground-glass opacity at the right lower lobe which likely represents bronchiolitis/developing pneumonia. The 1.1 cm right lower lobe pulmonary nodule is stable.      1. Acute right lower lobe airspace opacity likely represents developing pneumonia. Follow-up to complete resolution is recommended and referral to the multidisciplinary lung care clinic is recommended for the 1.1 cm right lower lobe pulmonary nodule. 2. Enlarged and heterogeneous prostate. PSA follow-up is recommended.       I ordered the above noted radiological studies. Reviewed by me and discussed with radiologist.  See dictation for official radiology interpretation.      MEDICATIONS GIVEN IN ER    Medications   sodium chloride 0.9 % flush 10 mL (has no administration in time range)   sodium chloride 0.9 % bolus 1,000 mL (0 mL Intravenous Stopped 12/18/23 1756)   ondansetron (ZOFRAN) injection 4 mg (4 mg Intravenous Given 12/18/23 1406)   iopamidol (ISOVUE-300) 61 % injection 100 mL (100 mL Intravenous Given by Other 12/18/23 1548)   amoxicillin-clavulanate (AUGMENTIN) 875-125 MG per tablet 1 tablet (1 tablet Oral Given 12/18/23 1756)         ADDITIONAL ORDERS CONSIDERED BUT NOT ORDERED:  Nothing      PROGRESS, DATA ANALYSIS, CONSULTS, AND MEDICAL DECISION MAKING    All labs have been independently interpreted by myself.  All radiology studies have been independently interpreted by myself and discussed with radiologist dictating the report.   EKG's independently interpreted by myself.  Discussion below represents my analysis of pertinent findings related to patient's condition, differential diagnosis, treatment plan and final disposition.    I have discussed case with Dr. Santos, emergency room physician.  He has performed his own bedside examination and agrees with treatment plan.    ED Course as of 12/18/23 1940   Mon Dec 18, 2023   1410  Patient presents with 2-day history of vomiting, epigastric abdominal pain.  Differential diagnoses include not limited to pancreatitis, gastritis, bowel obstruction. [EE]   1425 WBC(!): 12.58 [EE]   1425 Lipase: 27 [EE]   1425 Creatinine: 0.94 [EE]   1638 CT Abdomen Pelvis With Contrast  My independent interpretation of the CT of the abdomen pelvis is no bowel obstruction [TR]   1705 Updated patient on workup.  CT scan does show a right lower lobe infiltrate.  Patient states that he has been coughing over the past several days.  He is not hypoxic or in any respiratory distress.  I believe he is safe to discharge with antibiotics and outpatient treatment.  We will ensure that he can eat and drink before he leaves. [EE]   1751 Recheck of patient.  He is tolerating food and fluids.  We will discharge. [EE]      ED Course User Index  [EE] Bk Perez PA  [TR] Francisco J Santos MD       AS OF 19:40 EST VITALS:    BP - 135/72  HR - 74  TEMP - 98.9 °F (37.2 °C)  O2 SATS - 99%        DIAGNOSIS  Final diagnoses:   Community acquired pneumonia of right lower lobe of lung   Epigastric pain         DISPOSITION  Discharged      Dictated utilizing Dragon dictation     Bk Perez PA  12/18/23 1940

## 2023-12-20 RX ORDER — SILDENAFIL 25 MG/1
TABLET, FILM COATED ORAL
Qty: 90 TABLET | Refills: 0 | Status: SHIPPED | OUTPATIENT
Start: 2023-12-20

## 2024-01-03 DIAGNOSIS — E11.9 TYPE 2 DIABETES MELLITUS WITHOUT COMPLICATION, WITH LONG-TERM CURRENT USE OF INSULIN: ICD-10-CM

## 2024-01-03 DIAGNOSIS — Z79.4 TYPE 2 DIABETES MELLITUS WITHOUT COMPLICATION, WITH LONG-TERM CURRENT USE OF INSULIN: ICD-10-CM

## 2024-01-03 RX ORDER — FLASH GLUCOSE SENSOR
KIT MISCELLANEOUS
Qty: 2 EACH | Refills: 11 | Status: SHIPPED | OUTPATIENT
Start: 2024-01-03

## 2024-01-03 NOTE — TELEPHONE ENCOUNTER
Rx Refill Note  Requested Prescriptions     Pending Prescriptions Disp Refills    Continuous Blood Gluc Sensor (FreeStyle Eulalio 14 Day Sensor) misc [Pharmacy Med Name: FREESTYLE EULALIO 14 DAY SENSOR]       Sig: USE SENSOR TO CHECK BLOOD SUGAR THREE TIMES A DAY. CHANGE EVERY 14 DAYS      Last office visit with prescribing clinician: 10/30/2023   Last telemedicine visit with prescribing clinician: Visit date not found   Next office visit with prescribing clinician: Visit date not found                         Would you like a call back once the refill request has been completed: [] Yes [] No    If the office needs to give you a call back, can they leave a voicemail: [] Yes [] No    Bobbi Cordoba MA  01/03/24, 09:21 EST

## 2024-01-08 ENCOUNTER — TELEPHONE (OUTPATIENT)
Dept: CARDIOLOGY | Facility: CLINIC | Age: 64
End: 2024-01-08
Payer: COMMERCIAL

## 2024-01-08 NOTE — TELEPHONE ENCOUNTER
Spoke with patient and went over the details of their Nuclear test.  Do not eat, drink, smoke (this includes e-cigarettes), chew tobacco or gum 4 hours prior to the test.  No caffeine or chocolate 24 hours prior to your test. This includes coffee, tea, soda, all decaffeinated beverages, cappuccino flavorings or any energy drinks. Went over what medications they can or can't take 24 hours prior to test.

## 2024-01-09 ENCOUNTER — TRANSCRIBE ORDERS (OUTPATIENT)
Dept: ADMINISTRATIVE | Facility: HOSPITAL | Age: 64
End: 2024-01-09
Payer: COMMERCIAL

## 2024-01-09 ENCOUNTER — HOSPITAL ENCOUNTER (OUTPATIENT)
Dept: CARDIOLOGY | Facility: HOSPITAL | Age: 64
Discharge: HOME | End: 2024-01-09
Admitting: STUDENT IN AN ORGANIZED HEALTH CARE EDUCATION/TRAINING PROGRAM
Payer: COMMERCIAL

## 2024-01-09 DIAGNOSIS — R07.89 OTHER CHEST PAIN: ICD-10-CM

## 2024-01-09 DIAGNOSIS — R91.1 LUNG NODULE: Primary | ICD-10-CM

## 2024-01-09 DIAGNOSIS — I25.10 LAD STENOSIS: ICD-10-CM

## 2024-01-09 LAB
BH CV NUCLEAR PRIOR STUDY: 2
BH CV REST NUCLEAR ISOTOPE DOSE: 10.4 MCI
BH CV STRESS BP STAGE 1: NORMAL
BH CV STRESS COMMENTS STAGE 1: NORMAL
BH CV STRESS DOSE REGADENOSON STAGE 1: 0.4
BH CV STRESS DURATION MIN STAGE 1: 0
BH CV STRESS DURATION SEC STAGE 1: 10
BH CV STRESS HR STAGE 1: 111
BH CV STRESS NUCLEAR ISOTOPE DOSE: 35.6 MCI
BH CV STRESS PROTOCOL 1: NORMAL
BH CV STRESS RECOVERY BP: NORMAL MMHG
BH CV STRESS RECOVERY HR: 99 BPM
BH CV STRESS STAGE 1: 1
LV EF NUC BP: 48 %
MAXIMAL PREDICTED HEART RATE: 157 BPM
PERCENT MAX PREDICTED HR: 70.7 %
STRESS BASELINE BP: NORMAL MMHG
STRESS BASELINE HR: 79 BPM
STRESS PERCENT HR: 83 %
STRESS POST EXERCISE DUR SEC: 10 SEC
STRESS POST PEAK BP: NORMAL MMHG
STRESS POST PEAK HR: 111 BPM
STRESS TARGET HR: 133 BPM

## 2024-01-09 PROCEDURE — 0 TECHNETIUM TETROFOSMIN KIT: Performed by: STUDENT IN AN ORGANIZED HEALTH CARE EDUCATION/TRAINING PROGRAM

## 2024-01-09 PROCEDURE — 78452 HT MUSCLE IMAGE SPECT MULT: CPT

## 2024-01-09 PROCEDURE — 25010000002 REGADENOSON 0.4 MG/5ML SOLUTION: Performed by: STUDENT IN AN ORGANIZED HEALTH CARE EDUCATION/TRAINING PROGRAM

## 2024-01-09 PROCEDURE — 93017 CV STRESS TEST TRACING ONLY: CPT

## 2024-01-09 PROCEDURE — A9502 TC99M TETROFOSMIN: HCPCS | Performed by: STUDENT IN AN ORGANIZED HEALTH CARE EDUCATION/TRAINING PROGRAM

## 2024-01-09 RX ORDER — REGADENOSON 0.08 MG/ML
0.4 INJECTION, SOLUTION INTRAVENOUS
Status: COMPLETED | OUTPATIENT
Start: 2024-01-09 | End: 2024-01-09

## 2024-01-09 RX ADMIN — TETROFOSMIN 1 DOSE: 1.38 INJECTION, POWDER, LYOPHILIZED, FOR SOLUTION INTRAVENOUS at 12:25

## 2024-01-09 RX ADMIN — REGADENOSON 0.4 MG: 0.08 INJECTION, SOLUTION INTRAVENOUS at 12:25

## 2024-01-09 RX ADMIN — TETROFOSMIN 1 DOSE: 1.38 INJECTION, POWDER, LYOPHILIZED, FOR SOLUTION INTRAVENOUS at 11:39

## 2024-01-10 DIAGNOSIS — R94.39 ABNORMAL NUCLEAR STRESS TEST: Primary | ICD-10-CM

## 2024-01-10 NOTE — PROGRESS NOTES
Silver Babcock,    I tried calling you but it went to voicemail.  Your stress test was abnormal and I think given your symptoms, it is reasonable to repeat the catheterization/angiogram to look for any new possible blockages that may need to be fixed.  Let me know you have any questions or concerns.  I will go ahead and put the order, expect a call from our schedulers.    Dr. Parker

## 2024-01-12 ENCOUNTER — TRANSCRIBE ORDERS (OUTPATIENT)
Dept: CARDIOLOGY | Facility: CLINIC | Age: 64
End: 2024-01-12
Payer: COMMERCIAL

## 2024-01-12 ENCOUNTER — TELEPHONE (OUTPATIENT)
Dept: CARDIOLOGY | Facility: CLINIC | Age: 64
End: 2024-01-12

## 2024-01-12 DIAGNOSIS — Z01.810 PRE-OPERATIVE CARDIOVASCULAR EXAMINATION: Primary | ICD-10-CM

## 2024-01-12 DIAGNOSIS — Z13.6 SCREENING FOR ISCHEMIC HEART DISEASE: ICD-10-CM

## 2024-01-12 PROBLEM — R94.39 ABNORMAL NUCLEAR STRESS TEST: Status: ACTIVE | Noted: 2024-01-10

## 2024-01-18 ENCOUNTER — LAB (OUTPATIENT)
Dept: LAB | Facility: HOSPITAL | Age: 64
End: 2024-01-18
Payer: COMMERCIAL

## 2024-01-18 ENCOUNTER — HOSPITAL ENCOUNTER (OUTPATIENT)
Facility: HOSPITAL | Age: 64
Setting detail: HOSPITAL OUTPATIENT SURGERY
Discharge: HOME | End: 2024-01-18
Attending: STUDENT IN AN ORGANIZED HEALTH CARE EDUCATION/TRAINING PROGRAM | Admitting: STUDENT IN AN ORGANIZED HEALTH CARE EDUCATION/TRAINING PROGRAM
Payer: COMMERCIAL

## 2024-01-18 VITALS
DIASTOLIC BLOOD PRESSURE: 75 MMHG | TEMPERATURE: 97 F | HEIGHT: 67 IN | WEIGHT: 187 LBS | OXYGEN SATURATION: 94 % | BODY MASS INDEX: 29.35 KG/M2 | HEART RATE: 95 BPM | RESPIRATION RATE: 18 BRPM | SYSTOLIC BLOOD PRESSURE: 129 MMHG

## 2024-01-18 DIAGNOSIS — Z13.6 SCREENING FOR ISCHEMIC HEART DISEASE: ICD-10-CM

## 2024-01-18 DIAGNOSIS — R94.39 ABNORMAL NUCLEAR STRESS TEST: ICD-10-CM

## 2024-01-18 DIAGNOSIS — Z01.810 PRE-OPERATIVE CARDIOVASCULAR EXAMINATION: ICD-10-CM

## 2024-01-18 LAB
ACT BLD: 320 SECONDS (ref 82–152)
ANION GAP SERPL CALCULATED.3IONS-SCNC: 10 MMOL/L (ref 5–15)
BASOPHILS # BLD AUTO: 0.05 10*3/MM3 (ref 0–0.2)
BASOPHILS NFR BLD AUTO: 0.7 % (ref 0–1.5)
BUN SERPL-MCNC: 12 MG/DL (ref 8–23)
BUN/CREAT SERPL: 12.1 (ref 7–25)
CALCIUM SPEC-SCNC: 9.7 MG/DL (ref 8.6–10.5)
CHLORIDE SERPL-SCNC: 103 MMOL/L (ref 98–107)
CO2 SERPL-SCNC: 27 MMOL/L (ref 22–29)
CREAT SERPL-MCNC: 0.99 MG/DL (ref 0.76–1.27)
DEPRECATED RDW RBC AUTO: 42.1 FL (ref 37–54)
EGFRCR SERPLBLD CKD-EPI 2021: 85.6 ML/MIN/1.73
EOSINOPHIL # BLD AUTO: 0.12 10*3/MM3 (ref 0–0.4)
EOSINOPHIL NFR BLD AUTO: 1.6 % (ref 0.3–6.2)
ERYTHROCYTE [DISTWIDTH] IN BLOOD BY AUTOMATED COUNT: 13.1 % (ref 12.3–15.4)
GLUCOSE BLDC GLUCOMTR-MCNC: 87 MG/DL (ref 70–130)
GLUCOSE SERPL-MCNC: 119 MG/DL (ref 65–99)
HCT VFR BLD AUTO: 43.5 % (ref 37.5–51)
HGB BLD-MCNC: 14.3 G/DL (ref 13–17.7)
IMM GRANULOCYTES # BLD AUTO: 0.02 10*3/MM3 (ref 0–0.05)
IMM GRANULOCYTES NFR BLD AUTO: 0.3 % (ref 0–0.5)
LYMPHOCYTES # BLD AUTO: 1.28 10*3/MM3 (ref 0.7–3.1)
LYMPHOCYTES NFR BLD AUTO: 17 % (ref 19.6–45.3)
MCH RBC QN AUTO: 28.9 PG (ref 26.6–33)
MCHC RBC AUTO-ENTMCNC: 32.9 G/DL (ref 31.5–35.7)
MCV RBC AUTO: 87.9 FL (ref 79–97)
MONOCYTES # BLD AUTO: 0.6 10*3/MM3 (ref 0.1–0.9)
MONOCYTES NFR BLD AUTO: 8 % (ref 5–12)
NEUTROPHILS NFR BLD AUTO: 5.45 10*3/MM3 (ref 1.7–7)
NEUTROPHILS NFR BLD AUTO: 72.4 % (ref 42.7–76)
NRBC BLD AUTO-RTO: 0 /100 WBC (ref 0–0.2)
PLATELET # BLD AUTO: 285 10*3/MM3 (ref 140–450)
PMV BLD AUTO: 8.9 FL (ref 6–12)
POTASSIUM SERPL-SCNC: 4.6 MMOL/L (ref 3.5–5.2)
RBC # BLD AUTO: 4.95 10*6/MM3 (ref 4.14–5.8)
SODIUM SERPL-SCNC: 140 MMOL/L (ref 136–145)
WBC NRBC COR # BLD AUTO: 7.52 10*3/MM3 (ref 3.4–10.8)

## 2024-01-18 PROCEDURE — 25010000002 HEPARIN (PORCINE) PER 1000 UNITS: Performed by: STUDENT IN AN ORGANIZED HEALTH CARE EDUCATION/TRAINING PROGRAM

## 2024-01-18 PROCEDURE — 80048 BASIC METABOLIC PNL TOTAL CA: CPT

## 2024-01-18 PROCEDURE — C1887 CATHETER, GUIDING: HCPCS | Performed by: STUDENT IN AN ORGANIZED HEALTH CARE EDUCATION/TRAINING PROGRAM

## 2024-01-18 PROCEDURE — C1769 GUIDE WIRE: HCPCS | Performed by: STUDENT IN AN ORGANIZED HEALTH CARE EDUCATION/TRAINING PROGRAM

## 2024-01-18 PROCEDURE — 25010000002 FENTANYL CITRATE (PF) 50 MCG/ML SOLUTION: Performed by: STUDENT IN AN ORGANIZED HEALTH CARE EDUCATION/TRAINING PROGRAM

## 2024-01-18 PROCEDURE — S0260 H&P FOR SURGERY: HCPCS | Performed by: STUDENT IN AN ORGANIZED HEALTH CARE EDUCATION/TRAINING PROGRAM

## 2024-01-18 PROCEDURE — 36415 COLL VENOUS BLD VENIPUNCTURE: CPT

## 2024-01-18 PROCEDURE — 93458 L HRT ARTERY/VENTRICLE ANGIO: CPT | Performed by: STUDENT IN AN ORGANIZED HEALTH CARE EDUCATION/TRAINING PROGRAM

## 2024-01-18 PROCEDURE — 25510000001 IOPAMIDOL PER 1 ML: Performed by: STUDENT IN AN ORGANIZED HEALTH CARE EDUCATION/TRAINING PROGRAM

## 2024-01-18 PROCEDURE — 93571 IV DOP VEL&/PRESS C FLO 1ST: CPT | Performed by: STUDENT IN AN ORGANIZED HEALTH CARE EDUCATION/TRAINING PROGRAM

## 2024-01-18 PROCEDURE — C1894 INTRO/SHEATH, NON-LASER: HCPCS | Performed by: STUDENT IN AN ORGANIZED HEALTH CARE EDUCATION/TRAINING PROGRAM

## 2024-01-18 PROCEDURE — 85025 COMPLETE CBC W/AUTO DIFF WBC: CPT

## 2024-01-18 PROCEDURE — 25010000002 MIDAZOLAM PER 1 MG: Performed by: STUDENT IN AN ORGANIZED HEALTH CARE EDUCATION/TRAINING PROGRAM

## 2024-01-18 PROCEDURE — 25810000003 SODIUM CHLORIDE 0.9 % SOLUTION: Performed by: STUDENT IN AN ORGANIZED HEALTH CARE EDUCATION/TRAINING PROGRAM

## 2024-01-18 PROCEDURE — 82948 REAGENT STRIP/BLOOD GLUCOSE: CPT

## 2024-01-18 PROCEDURE — 93799 UNLISTED CV SVC/PROCEDURE: CPT | Performed by: STUDENT IN AN ORGANIZED HEALTH CARE EDUCATION/TRAINING PROGRAM

## 2024-01-18 PROCEDURE — 85347 COAGULATION TIME ACTIVATED: CPT

## 2024-01-18 PROCEDURE — 25010000002 NICARDIPINE 2.5 MG/ML SOLUTION: Performed by: STUDENT IN AN ORGANIZED HEALTH CARE EDUCATION/TRAINING PROGRAM

## 2024-01-18 RX ORDER — ACETAMINOPHEN 325 MG/1
650 TABLET ORAL EVERY 4 HOURS PRN
Status: DISCONTINUED | OUTPATIENT
Start: 2024-01-18 | End: 2024-01-18 | Stop reason: HOSPADM

## 2024-01-18 RX ORDER — HEPARIN SODIUM 1000 [USP'U]/ML
INJECTION, SOLUTION INTRAVENOUS; SUBCUTANEOUS
Status: DISCONTINUED | OUTPATIENT
Start: 2024-01-18 | End: 2024-01-18 | Stop reason: HOSPADM

## 2024-01-18 RX ORDER — NICARDIPINE HYDROCHLORIDE 2.5 MG/ML
INJECTION INTRAVENOUS
Status: DISCONTINUED | OUTPATIENT
Start: 2024-01-18 | End: 2024-01-18 | Stop reason: HOSPADM

## 2024-01-18 RX ORDER — SODIUM CHLORIDE 9 MG/ML
75 INJECTION, SOLUTION INTRAVENOUS CONTINUOUS
Status: DISCONTINUED | OUTPATIENT
Start: 2024-01-18 | End: 2024-01-18 | Stop reason: HOSPADM

## 2024-01-18 RX ORDER — MIDAZOLAM HYDROCHLORIDE 1 MG/ML
INJECTION INTRAMUSCULAR; INTRAVENOUS
Status: DISCONTINUED | OUTPATIENT
Start: 2024-01-18 | End: 2024-01-18 | Stop reason: HOSPADM

## 2024-01-18 RX ORDER — FENTANYL CITRATE 50 UG/ML
INJECTION, SOLUTION INTRAMUSCULAR; INTRAVENOUS
Status: DISCONTINUED | OUTPATIENT
Start: 2024-01-18 | End: 2024-01-18 | Stop reason: HOSPADM

## 2024-01-18 RX ORDER — LIDOCAINE HYDROCHLORIDE 20 MG/ML
INJECTION, SOLUTION INFILTRATION; PERINEURAL
Status: DISCONTINUED | OUTPATIENT
Start: 2024-01-18 | End: 2024-01-18 | Stop reason: HOSPADM

## 2024-01-18 RX ADMIN — SODIUM CHLORIDE 75 ML/HR: 9 INJECTION, SOLUTION INTRAVENOUS at 12:13

## 2024-01-18 NOTE — DISCHARGE INSTRUCTIONS
[facility]  [address] [place] [address]    Coronary Angiogram (Radial/Ulnar Approach) After Care    Refer to this sheet in the next few weeks. These instructions provide you with information on caring for yourself after your procedure. Your caregiver may also give you more specific instructions. Your treatment has been planned according to current medical practices, but problems sometimes occur. Call your caregiver if you have any problems or questions after your procedure.    Home Care Instructions:  You may shower the day after the procedure. Remove the bandage (dressing) and gently wash the site with plain soap and water. Gently pat the site dry. You may apply a band aid daily for 2 days if desired.    Do not apply powder or lotion to the site.  Do not submerge the affected site in water for 3 to 5 days or until the site is completely healed.   Do not lift, push or pull anything over 5 pounds for 5 days after your procedure or as directed by your physician.  As a reference, a gallon of milk weighs 8 pounds.   Inspect the site at least twice daily. You may notice some bruising at the site and it may be tender for 1 to 2 weeks.     Increase your fluid intake for the next 2 days.    Keep arm elevated for 24 hours. For the remainder of the day, keep your arm in “Pledge of Allegiance” position when up and about.     You may drive 24 hours after the procedure unless otherwise instructed by your caregiver.  Do not operate machinery or power tools for 24 hours.  A responsible adult should be with you for the first 24 hours after you arrive home. Do not make any important legal decisions or sign legal papers for 24 hours.  Do not drink alcohol for 24 hours.    Metformin or any medications containing Metformin should not be taken for 48 hours after your procedure.      Call Your Doctor if:   You have unusual pain at the radial/ulnar (wrist) site.  You have redness, warmth, swelling, or pain at the  radial/ulnar (wrist) site.  You have drainage (other than a small amount of blood on the dressing).  `You have chills or a fever > 101.  Your arm becomes pale or dark, cool, tingly, or numb.  You develop chest pain, shortness of breath, feel faint or pass out.    You have heavy bleeding from the site, hold pressure on the site for 20 minutes.  If the bleeding stops, apply a fresh bandage and call your cardiologist.  However, if you        continue to have bleeding, call 911 and continue to apply pressure to the site.   You have any symptoms of a stroke.  Remember BE FAST  B-balance. Sudden trouble walking or loss of balance.  E-eyes.  Sudden changes in how you see or a sudden onset of a very bad headache.   F-face. Sudden weakness or loss of feeling of the face or facial droop on one side.   A-arms Sudden weakness or numbness in one arm.  One arm drifts down if they are both held out in front of you. This happens suddenly and usually on one side of the body.   S-speech.  Sudden trouble speaking, slurred speech or trouble understanding what are saying.   T-time  Time to call emergency services.  Write down the symptoms and the time they started.

## 2024-01-18 NOTE — Clinical Note
Hemostasis started on the right radial artery. R-Band was used in achieving hemostasis. Radial compression device applied to vessel. Hemostasis achieved successfully. Closure device additional comment: VASC BAND- 10CC'S

## 2024-01-18 NOTE — H&P
Date of Hospital Visit: 24  Encounter Provider: Devon Cristobal MD  Place of Service: [facility] CARDIOLOGY  Patient Name: Sadiq Aldana  :1960  [id number]    Chief complaint: LOMELI, abnormal stress test    History of Present Illness:  62 y.o. male with hypertension, hyperlipidemia, diabetes (A1C 8), CAD status post 3.0 x 28mm Xience Skypoint drug-eluting stent (postdilated with a 3.5 mm NC) to distal RCA/ostial PL on 23 and with recent admission for unstable angina with PCI to severe distal RCA stenosis who complained of worsening LOMELI. He underwent nuclear stress testing which was abnormal with a lateral defect.    Past Medical History:   Diagnosis Date    Cholelithiasis     Removed    Depression     Cwife    Diabetes mellitus     Erectile dysfunction     GERD (gastroesophageal reflux disease)     Hyperlipidemia     Hypertension     Kidney lesion 10/26/2023    Obstructive sleep apnea syndrome     Peptic ulceration        Past Surgical History:   Procedure Laterality Date    CARDIAC CATHETERIZATION N/A 2023    Procedure: Left Heart Cath;  Surgeon: Jacque Camp MD;  [facility];  Service: Cardiology;  Laterality: N/A;    CARDIAC CATHETERIZATION N/A 2023    Procedure: Coronary angiography;  Surgeon: Jacque Camp MD;  [facility];  Service: Cardiology;  Laterality: N/A;    CARDIAC CATHETERIZATION N/A 2023    Procedure: Left ventriculography;  Surgeon: Jacque Camp MD;  [facility];  Service: Cardiology;  Laterality: N/A;    CARDIAC CATHETERIZATION N/A 2023    Procedure: Percutaneous Coronary Intervention;  Surgeon: Jacque Camp MD;  [facility];  Service: Cardiology;  Laterality: N/A;    CARDIAC CATHETERIZATION N/A 2023    Procedure: Stent EUGENIO coronary;  Surgeon: Jacque Camp MD;  [facility]  [facility];  Service: Cardiology;  Laterality: N/A;    CARDIAC CATHETERIZATION N/A 09/19/2023    Procedure: Left Heart Cath;  Surgeon: Mary Orta MD;  Location:  [facility] CATH INVASIVE LOCATION;  Service: Cardiovascular;  Laterality: N/A;    CARDIAC CATHETERIZATION N/A 09/19/2023    Procedure: Coronary angiography;  Surgeon: Mary Orta MD;  Location:  [facility] CATH INVASIVE LOCATION;  Service: Cardiovascular;  Laterality: N/A;    CARDIAC CATHETERIZATION N/A 09/19/2023    Procedure: Percutaneous Coronary Intervention;  Surgeon: Mary Orta MD;  Location:  [facility] CATH INVASIVE LOCATION;  Service: Cardiovascular;  Laterality: N/A;    CARDIAC CATHETERIZATION N/A 09/19/2023    Procedure: Optical Coherence Tomography;  Surgeon: Mary Orta MD;  Location:  [facility] CATH INVASIVE LOCATION;  Service: Cardiovascular;  Laterality: N/A;    CARDIAC CATHETERIZATION N/A 09/19/2023    Procedure: Stent EUGENIO coronary;  Surgeon: Mary Orta MD;  Location:  [facility] CATH INVASIVE LOCATION;  Service: Cardiovascular;  Laterality: N/A;    CHOLECYSTECTOMY      COLONOSCOPY      CORONARY STENT PLACEMENT  1/112023    FRACTURE SURGERY      SHOULDER ARTHROSCOPY Bilateral     removal of bone spurs    UPPER GASTROINTESTINAL ENDOSCOPY         Medications Prior to Admission   Medication Sig Dispense Refill Last Dose    acetaminophen (TYLENOL) 325 MG tablet Take 2 tablets by mouth Every 6 (Six) Hours As Needed for Mild Pain. 30 tablet 0 Past Month    aspirin 81 MG EC tablet Take 1 tablet by mouth Daily. 90 tablet 4 1/17/2024    atorvastatin (Lipitor) 40 MG tablet Take 1 tablet by mouth Daily. 30 tablet 5 1/17/2024    cholecalciferol (VITAMIN D3) 10 MCG (400 UNIT) tablet Take 1 tablet by mouth Daily. 90 tablet 4 1/17/2024    Dulaglutide 4.5 MG/0.5ML solution pen-injector Inject 0.5 mL under the skin into the appropriate area as directed 1 (One) Time Per Week. 2 mL 5 Past Week    Empagliflozin-metFORMIN HCl ER (Synjardy XR) 12.5-1000 MG tablet  sustained-release 24 hour Take 1 tablet by mouth 2 (Two) Times a Day. 180 tablet 1 1/17/2024    esomeprazole (nexIUM) 40 MG capsule Take 1 capsule by mouth 2 (Two) Times a Day. 180 capsule 3 1/17/2024    lisinopril (PRINIVIL,ZESTRIL) 10 MG tablet TAKE ONE TABLET BY MOUTH DAILY 30 tablet 5 1/18/2024    metoprolol tartrate (LOPRESSOR) 25 MG tablet Take 0.5 tablets by mouth Every 12 (Twelve) Hours. 30 tablet 5 1/18/2024    ondansetron (ZOFRAN) 4 MG tablet TAKE 1 TABLET BY MOUTH EVERY 8 HOURS AS NEEDED FOR NAUSEA AND/OR VOMITING 12 tablet 0 Past Month    prasugrel (EFFIENT) 10 MG tablet Take 1 tablet by mouth Daily. 30 tablet 11 1/18/2024    Tresiba FlexTouch 100 UNIT/ML solution pen-injector injection Inject 28 Units under the skin into the appropriate area as directed Every Night. Doing in am now 200 mL 5 1/17/2024    Continuous Blood Gluc Sensor (FreeStyle Eulalio 14 Day Sensor) [facility] USE SENSOR TO CHECK BLOOD SUGAR THREE TIMES A DAY. CHANGE EVERY 14 DAYS 2 each 11     sildenafil (VIAGRA) 25 MG tablet TAKE 2 TABLETS BY MOUTH DAILY AS NEEDED FOR ERECTILE DYSFUNCTION 90 tablet 0 More than a month       Current Meds  No current facility-administered medications on file prior to encounter.     Current Outpatient Medications on File Prior to Encounter   Medication Sig Dispense Refill    acetaminophen (TYLENOL) 325 MG tablet Take 2 tablets by mouth Every 6 (Six) Hours As Needed for Mild Pain. 30 tablet 0    aspirin 81 MG EC tablet Take 1 tablet by mouth Daily. 90 tablet 4    atorvastatin (Lipitor) 40 MG tablet Take 1 tablet by mouth Daily. 30 tablet 5    cholecalciferol (VITAMIN D3) 10 MCG (400 UNIT) tablet Take 1 tablet by mouth Daily. 90 tablet 4    Dulaglutide 4.5 MG/0.5ML solution pen-injector Inject 0.5 mL under the skin into the appropriate area as directed 1 (One) Time Per Week. 2 mL 5    Empagliflozin-metFORMIN HCl ER (Synjardy XR) 12.5-1000 MG tablet sustained-release 24 hour Take 1 tablet by mouth 2 (Two) Times a  Day. 180 tablet 1    esomeprazole (nexIUM) 40 MG capsule Take 1 capsule by mouth 2 (Two) Times a Day. 180 capsule 3    lisinopril (PRINIVIL,ZESTRIL) 10 MG tablet TAKE ONE TABLET BY MOUTH DAILY 30 tablet 5    metoprolol tartrate (LOPRESSOR) 25 MG tablet Take 0.5 tablets by mouth Every 12 (Twelve) Hours. 30 tablet 5    ondansetron (ZOFRAN) 4 MG tablet TAKE 1 TABLET BY MOUTH EVERY 8 HOURS AS NEEDED FOR NAUSEA AND/OR VOMITING 12 tablet 0    prasugrel (EFFIENT) 10 MG tablet Take 1 tablet by mouth Daily. 30 tablet 11    Tresiba FlexTouch 100 UNIT/ML solution pen-injector injection Inject 28 Units under the skin into the appropriate area as directed Every Night. Doing in am now 200 mL 5    Continuous Blood Gluc Sensor ([place] Eulalio 14 Day Sensor) [facility] [place] USE SENSOR TO CHECK BLOOD SUGAR THREE TIMES A DAY. CHANGE EVERY 14 DAYS 2 each 11    sildenafil (VIAGRA) 25 MG tablet TAKE 2 TABLETS BY MOUTH DAILY AS NEEDED FOR ERECTILE DYSFUNCTION 90 tablet 0    [DISCONTINUED] amoxicillin-clavulanate (AUGMENTIN) 875-125 MG per tablet Take 1 tablet by mouth Every 12 (Twelve) Hours. 14 tablet 0       Social History     Socioeconomic History    Marital status:    Tobacco Use    Smoking status: Never    Smokeless tobacco: Never   Vaping Use    Vaping Use: Never used   Substance and Sexual Activity    Alcohol use: Not Currently     Comment: Less than 2 a month    Drug use: Never    Sexual activity: Yes     Partners: Female     Birth control/protection: Condom, Pill, None       Family Hx: Non-contributory    REVIEW OF SYSTEMS:   ROS was performed and is negative except as outlined in HPI     REVIEW OF SYSTEMS:   CONSTITUTIONAL: No weight loss, fever, chills, weakness or fatigue.   HEENT: Eyes: No visual loss, blurred vision, double vision or yellow sclerae. Ears, Nose, Throat: No hearing loss, sneezing, congestion, runny nose or sore throat.   SKIN: No rash or itching.     RESPIRATORY: No shortness of breath, hemoptysis, cough or  "sputum.   GASTROINTESTINAL: No anorexia, nausea, vomiting or diarrhea. No abdominal pain, bright red blood per rectum or melena.  NEUROLOGICAL: No headache, dizziness, syncope, paralysis, numbness or tingling in the extremities.  MUSCULOSKELETAL: No muscle, back pain, joint pain or stiffness.   HEMATOLOGIC: No anemia, bleeding or bruising.   LYMPHATICS: No enlarged nodes.  PSYCHIATRIC: No history of depression, anxiety, hallucinations.   ENDOCRINOLOGIC: No reports of sweating, cold or heat intolerance. No polyuria or polydipsia.        Objective:     Vitals:    01/18/24 1206   BP: 135/80   BP Location: Left arm   Patient Position: Sitting   Pulse: 85   Resp: 18   Temp: 97 °F (36.1 °C)   TempSrc: Temporal   SpO2: 98%   Weight: 84.8 kg (187 lb)   Height: 170.2 cm (67\")     Body mass index is 29.29 kg/m².  Flowsheet Rows      Flowsheet Row First Filed Value   Admission Height 170.2 cm (67\") Documented at 01/18/2024 1206   Admission Weight 84.8 kg (187 lb) Documented at 01/18/2024 1206            GEN: no distress, alert and oriented  HEENT: NACT, EOMI, moist mucous membranes  Lungs: CTAB, no wheezes, rales or rhonchi  CV: normal rate, regular rhythm, normal S1, S2, no murmurs, +2 radial pulses b/l, no carotid bruit  Abdomen: soft, nontender, nondistended, NABS  Extremities: no edema  Skin: no rash, warm, dry  Heme/Lymph: no bruising  Psych: organized thought, normal behavior and affect    Results from last 7 days   Lab Units 01/18/24  0927   SODIUM mmol/L 140   POTASSIUM mmol/L 4.6   CHLORIDE mmol/L 103   CO2 mmol/L 27.0   BUN mg/dL 12   CREATININE mg/dL 0.99   CALCIUM mg/dL 9.7   GLUCOSE mg/dL 119*         @LABRCNTbnp@  Results from last 7 days   Lab Units 01/18/24  0927   WBC 10*3/mm3 7.52   HEMOGLOBIN g/dL 14.3   HEMATOCRIT % 43.5   PLATELETS 10*3/mm3 285             @LABRCNTIP(chol,trig,hdl,ldl)      I personally viewed and interpreted the patient's EKG/Telemetry data      Assessment:  Active Hospital Problems    " Diagnosis  POA    **Abnormal nuclear stress test [R94.39]  Unknown     Priority: High      Resolved Hospital Problems   No resolved problems to display.       Plan: [facility] with possible PCI        Devon Parker MD, [facility]  01/18/24  13:00 EST.

## 2024-01-30 ENCOUNTER — HOSPITAL ENCOUNTER (OUTPATIENT)
Facility: HOSPITAL | Age: 64
Discharge: HOME | End: 2024-01-30
Admitting: INTERNAL MEDICINE
Payer: COMMERCIAL

## 2024-01-30 DIAGNOSIS — R91.1 LUNG NODULE: ICD-10-CM

## 2024-01-30 PROCEDURE — 71250 CT THORAX DX C-: CPT

## 2024-02-06 ENCOUNTER — TRANSCRIBE ORDERS (OUTPATIENT)
Dept: ADMINISTRATIVE | Facility: HOSPITAL | Age: 64
End: 2024-02-06
Payer: COMMERCIAL

## 2024-02-06 ENCOUNTER — OFFICE VISIT (OUTPATIENT)
Dept: FAMILY MEDICINE CLINIC | Facility: CLINIC | Age: 64
End: 2024-02-06
Payer: COMMERCIAL

## 2024-02-06 VITALS
HEART RATE: 95 BPM | TEMPERATURE: 98.4 F | DIASTOLIC BLOOD PRESSURE: 74 MMHG | RESPIRATION RATE: 16 BRPM | SYSTOLIC BLOOD PRESSURE: 128 MMHG | OXYGEN SATURATION: 97 % | HEIGHT: 67 IN | WEIGHT: 201 LBS | BODY MASS INDEX: 31.55 KG/M2

## 2024-02-06 DIAGNOSIS — Z79.4 TYPE 2 DIABETES MELLITUS WITHOUT COMPLICATION, WITH LONG-TERM CURRENT USE OF INSULIN: Primary | ICD-10-CM

## 2024-02-06 DIAGNOSIS — E11.9 TYPE 2 DIABETES MELLITUS WITHOUT COMPLICATION, WITH LONG-TERM CURRENT USE OF INSULIN: Primary | ICD-10-CM

## 2024-02-06 DIAGNOSIS — E78.2 MIXED HYPERLIPIDEMIA: ICD-10-CM

## 2024-02-06 DIAGNOSIS — I10 PRIMARY HYPERTENSION: ICD-10-CM

## 2024-02-06 RX ORDER — INSULIN GLARGINE 100 [IU]/ML
25 INJECTION, SOLUTION SUBCUTANEOUS DAILY
Qty: 3 ML | Refills: 1 | Status: SHIPPED | OUTPATIENT
Start: 2024-02-06

## 2024-02-06 RX ORDER — EMPAGLIFLOZIN, METFORMIN HYDROCHLORIDE 12.5; 1 MG/1; MG/1
1 TABLET, EXTENDED RELEASE ORAL
Qty: 180 TABLET | Refills: 1 | Status: SHIPPED | OUTPATIENT
Start: 2024-02-06

## 2024-02-06 RX ORDER — ATORVASTATIN CALCIUM 40 MG/1
40 TABLET, FILM COATED ORAL DAILY
Qty: 90 TABLET | Refills: 1 | Status: SHIPPED | OUTPATIENT
Start: 2024-02-06

## 2024-02-06 RX ORDER — INSULIN GLARGINE 100 [IU]/ML
25 INJECTION, SOLUTION SUBCUTANEOUS DAILY
Qty: 3 ML | Refills: 1 | Status: SHIPPED | OUTPATIENT
Start: 2024-02-06 | End: 2024-02-06 | Stop reason: SDUPTHER

## 2024-02-06 RX ORDER — LISINOPRIL 10 MG/1
10 TABLET ORAL DAILY
Qty: 90 TABLET | Refills: 1 | Status: SHIPPED | OUTPATIENT
Start: 2024-02-06

## 2024-02-12 ENCOUNTER — TRANSCRIBE ORDERS (OUTPATIENT)
Dept: ADMINISTRATIVE | Facility: HOSPITAL | Age: 64
End: 2024-02-12
Payer: COMMERCIAL

## 2024-02-12 DIAGNOSIS — R91.1 LUNG NODULE: Primary | ICD-10-CM

## 2024-02-12 NOTE — Clinical Note
The left coronary artery was selectively engaged, injected and visualized. X-ray of the knee shows no acute abnormalities, you may take Tylenol as needed.  Ice, elevate, rest the knee.  A referral for orthopedics, Dr. Fregoso, was provided, please call and establish care.  Follow-up with your PCP for reassessment after ED visit.  Return if new or worsening of symptoms.

## 2024-02-19 DIAGNOSIS — I10 PRIMARY HYPERTENSION: ICD-10-CM

## 2024-02-19 RX ORDER — PRASUGREL 10 MG/1
10 TABLET, FILM COATED ORAL DAILY
Qty: 30 TABLET | Refills: 11 | Status: SHIPPED | OUTPATIENT
Start: 2024-02-19

## 2024-03-22 DIAGNOSIS — Z79.4 TYPE 2 DIABETES MELLITUS WITHOUT COMPLICATION, WITH LONG-TERM CURRENT USE OF INSULIN: ICD-10-CM

## 2024-03-22 DIAGNOSIS — E11.9 TYPE 2 DIABETES MELLITUS WITHOUT COMPLICATION, WITH LONG-TERM CURRENT USE OF INSULIN: ICD-10-CM

## 2024-03-22 RX ORDER — INSULIN GLARGINE 100 [IU]/ML
25 INJECTION, SOLUTION SUBCUTANEOUS DAILY
Qty: 3 ML | Refills: 1 | Status: SHIPPED | OUTPATIENT
Start: 2024-03-22

## 2024-03-22 NOTE — TELEPHONE ENCOUNTER
Rx Refill Note  Requested Prescriptions     Pending Prescriptions Disp Refills    Insulin Glargine (BASAGLAR KWIKPEN) 100 UNIT/ML injection pen [Pharmacy Med Name: BASAGLAR 100 UNIT/ML KWIKPEN]  1     Sig: INJECT 25 UNITS UNDER THE SKIN INTO THE APPROPRIATE AREA AS DIRECTED DAILY.      Last office visit with prescribing clinician: 2/6/2024   Last telemedicine visit with prescribing clinician: Visit date not found   Next office visit with prescribing clinician: 5/14/2024                         Would you like a call back once the refill request has been completed: [] Yes [] No    If the office needs to give you a call back, can they leave a voicemail: [] Yes [] No    Bobbi Cordoba MA  03/22/24, 13:49 EDT

## 2024-03-29 ENCOUNTER — OFFICE VISIT (OUTPATIENT)
Age: 64
End: 2024-03-29
Payer: COMMERCIAL

## 2024-03-29 VITALS
BODY MASS INDEX: 30.61 KG/M2 | DIASTOLIC BLOOD PRESSURE: 74 MMHG | SYSTOLIC BLOOD PRESSURE: 118 MMHG | HEART RATE: 101 BPM | OXYGEN SATURATION: 98 % | WEIGHT: 195 LBS | HEIGHT: 67 IN

## 2024-03-29 DIAGNOSIS — I25.10 CAD, MULTIPLE VESSEL: Primary | ICD-10-CM

## 2024-03-29 DIAGNOSIS — E78.2 MIXED HYPERLIPIDEMIA: ICD-10-CM

## 2024-03-29 DIAGNOSIS — I10 PRIMARY HYPERTENSION: ICD-10-CM

## 2024-03-29 PROCEDURE — 99214 OFFICE O/P EST MOD 30 MIN: CPT | Performed by: STUDENT IN AN ORGANIZED HEALTH CARE EDUCATION/TRAINING PROGRAM

## 2024-03-29 NOTE — PROGRESS NOTES
Subjective:     Encounter Date:03/29/2024      Patient ID: Sadiq Aldana is a 63 y.o. male.    Chief Complaint:  Follow up of CAD    HPI:   63 y.o. male with hypertension, hyperlipidemia, diabetes (A1C 8), CAD status post 3.0 x 28mm Xience Skypoint drug-eluting stent (postdilated with a 3.5 mm NC) to distal RCA/ostial PL on 1/11/23 and with recent admission for unstable angina with PCI to severe distal RCA stenosis who presents for follow-up. Patient underwent nuclear stress test in December for further evaluation of dyspnea on exertion.  This was abnormal with a small sized inferior wall infarct as well as a small amount of moderate severity ischemia in the lateral wall.  He subsequently underwent left heart catheterization which revealed a 60% proximal LAD stenosis.  RFR of this was negative with a value of 0.95.  He continues to feel mild dyspnea on exertion which is unchanged from prior.      The following portions of the patient's history were reviewed and updated as appropriate: allergies, current medications, past family history, past medical history, past social history, past surgical history and problem list.     REVIEW OF SYSTEMS:   All systems reviewed.  Pertinent positives identified in HPI.  All other systems are negative.    Past Medical History:   Diagnosis Date    CAD, multiple vessel 3/29/2024    Cholelithiasis     Removed    Depression 6/22    Cwife    Diabetes mellitus     Erectile dysfunction     GERD (gastroesophageal reflux disease)     Hyperlipidemia     Hypertension     Kidney lesion 10/26/2023    Obstructive sleep apnea syndrome     Peptic ulceration        Family History   Problem Relation Age of Onset    Cancer Mother     Heart disease Mother     Other Father     No Known Problems Sister     No Known Problems Sister     No Known Problems Sister     No Known Problems Sister     Cancer Sister     Other Sister     Other Sister     Cancer Brother         throat    Colon cancer Neg Hx      Colon polyps Neg Hx     Crohn's disease Neg Hx     Irritable bowel syndrome Neg Hx     Ulcerative colitis Neg Hx         Social History     Socioeconomic History    Marital status:    Tobacco Use    Smoking status: Never    Smokeless tobacco: Never   Vaping Use    Vaping status: Never Used   Substance and Sexual Activity    Alcohol use: Not Currently     Comment: Less than 2 a month    Drug use: Never    Sexual activity: Yes     Partners: Female     Birth control/protection: Condom, Pill, None       No Known Allergies    Past Surgical History:   Procedure Laterality Date    CARDIAC CATHETERIZATION N/A 01/11/2023    Procedure: Left Heart Cath;  Surgeon: Jacque Camp MD;  Location:  [facility];  Service: Cardiology;  Laterality: N/A;    CARDIAC CATHETERIZATION N/A 01/11/2023    Procedure: Coronary angiography;  Surgeon: Jacque Camp MD;  Location:  [facility];  Service: Cardiology;  Laterality: N/A;    CARDIAC CATHETERIZATION N/A 01/11/2023    Procedure: Left ventriculography;  Surgeon: Jacque Camp MD;  Location:  [facility];  Service: Cardiology;  Laterality: N/A;    CARDIAC CATHETERIZATION N/A 01/11/2023    Procedure: Percutaneous Coronary Intervention;  Surgeon: Jacque Camp MD;  Location:  [facility];  Service: Cardiology;  Laterality: N/A;    CARDIAC CATHETERIZATION N/A 01/11/2023    Procedure: Stent EUGENIO coronary;  Surgeon: Jacque Camp MD;  Location:  [facility];  Service: Cardiology;  Laterality: N/A;    CARDIAC CATHETERIZATION N/A 09/19/2023    Procedure: Left Heart Cath;  Surgeon: Mary Orta MD;  [facility];  Service: Cardiovascular;  Laterality: N/A;    CARDIAC CATHETERIZATION N/A 09/19/2023    Procedure: Coronary angiography;  Surgeon: Mary Orta MD;  [facility];  Service: Cardiovascular;  Laterality: N/A;    CARDIAC  CATHETERIZATION N/A 09/19/2023    Procedure: Percutaneous Coronary Intervention;  Surgeon: Mary Orta MD;  Location:  [facility];  Service: Cardiovascular;  Laterality: N/A;    CARDIAC CATHETERIZATION N/A 09/19/2023    Procedure: Optical Coherence Tomography;  Surgeon: Mary Orta MD;  Location:  [facility];  Service: Cardiovascular;  Laterality: N/A;    CARDIAC CATHETERIZATION N/A 09/19/2023    Procedure: Stent EUGENIO coronary;  Surgeon: Mary Orta MD;  Location:  [facility];  Service: Cardiovascular;  Laterality: N/A;    CARDIAC CATHETERIZATION N/A 1/18/2024    Procedure: Left Heart Cath;  Surgeon: Devon Cristobal MD;  Location:  [facility];  Service: Cardiovascular;  Laterality: N/A;  Abnormal stress test    CARDIAC CATHETERIZATION N/A 1/18/2024    Procedure: Coronary angiography;  Surgeon: Devon Cristobal MD;  Location:  [facility];  Service: Cardiovascular;  Laterality: N/A;    CARDIAC CATHETERIZATION N/A 1/18/2024    Procedure: Resting Full Cycle Ratio;  Surgeon: Devon Cristobal MD;  Location:  [facility];  Service: Cardiovascular;  Laterality: N/A;    CHOLECYSTECTOMY      COLONOSCOPY      CORONARY STENT PLACEMENT  1/112023    FRACTURE SURGERY      SHOULDER ARTHROSCOPY Bilateral     removal of bone spurs    UPPER GASTROINTESTINAL ENDOSCOPY         Procedures       Objective:         Vitals:    03/29/24 1512   BP: 118/74   Pulse: 101   SpO2: 98%       PHYSICAL EXAM:  GEN: well appearing, in NAD   HEENT: NCAT, EOMI, moist mucus membranes   Respiratory: CTAB, no wheezes, rales or rhonchi  CV: normal rate, regular rhythm, normal S1, S2, no murmurs, rubs, gallops, right radial access site CDI, no hematoma, pulse 2+, mild ecchymosis over forearm  GI: soft, nontender, nondistended  MSK: no edema  Skin: no rash, warm, dry  Heme/Lymph: no bruising or bleeding  Psych: organized thought, normal behavior and  affect  Neuro: Alert and Oriented x 3, grossly normal motor function        Assessment:         (I25.10) CAD, multiple vessel    (I10) Primary hypertension    (E78.2) Mixed hyperlipidemia    62 y.o. male with hypertension, hyperlipidemia, diabetes (A1C 8), CAD status post 3.0 x 28mm Xience Skypoint drug-eluting stent (postdilated with a 3.5 mm NC) to distal RCA/ostial PL on 1/11/23 and with recent admission for unstable angina with PCI to severe distal RCA stenosis who presents for follow-up.          Plan:       #Coronary artery disease with recent PCI  PCI of the distal RCA lesion in September 2023 using a 3.5 x 18 mm Xience Skypoint drug-eluting stent at the proximal edge of the prior RCA stent from January 2023. Also with residual at least moderate proximal LAD disease.  - Continue aspirin 81 mg daily, prasugrel 10 mg daily, will ideally continue until September 2024.  For endoscopy and colonoscopy, it is okay to hold prasugrel and restart once able  - continue atorvastatin 40 mg daily  - Continue lisinopril 10 mg daily  -Continue metoprolol in case that is contributing to fatigue/LOMELI    #Hypertension  - Continue lisinopril as above  - d/c metoprolol    #Hyperlipidemia  -Continue atorvastatin as above    Dr Bonner, thank you very much for referring this kind patient to me. Please call me with any questions or concerns. I will see the patient again in the office in 6 months or earlier as needed.         Devon Cristobal MD  03/29/24  [facility]    Outpatient Encounter Medications as of 3/29/2024   Medication Sig Dispense Refill    acetaminophen (TYLENOL) 325 MG tablet Take 2 tablets by mouth Every 6 (Six) Hours As Needed for Mild Pain. 30 tablet 0    aspirin 81 MG EC tablet Take 1 tablet by mouth Daily. 90 tablet 4    atorvastatin (Lipitor) 40 MG tablet Take 1 tablet by mouth Daily. 90 tablet 1    cholecalciferol (VITAMIN D3) 10 MCG (400 UNIT) tablet Take 1 tablet by mouth Daily. 90 tablet 4     Continuous Blood Gluc Sensor (FreeStyle Eulalio 14 Day Sensor) [facility] USE SENSOR TO CHECK BLOOD SUGAR THREE TIMES A DAY. CHANGE EVERY 14 DAYS 2 each 11    Dulaglutide 4.5 MG/0.5ML solution pen-injector Inject 0.5 mL under the skin into the appropriate area as directed 1 (One) Time Per Week. 2 mL 5    Empagliflozin-metFORMIN HCl ER (Synjardy XR) 12.5-1000 MG tablet sustained-release 24 hour Take 1 tablet by mouth 2 (Two) Times a Day. 180 tablet 1    esomeprazole (nexIUM) 40 MG capsule Take 1 capsule by mouth 2 (Two) Times a Day. 180 capsule 3    Insulin Glargine (BASAGLAR KWIKPEN) 100 UNIT/ML injection pen INJECT 25 UNITS UNDER THE SKIN INTO THE APPROPRIATE AREA AS DIRECTED DAILY. 3 mL 1    lisinopril (PRINIVIL,ZESTRIL) 10 MG tablet Take 1 tablet by mouth Daily. 90 tablet 1    metoprolol tartrate (LOPRESSOR) 25 MG tablet Take 0.5 tablets by mouth Every 12 (Twelve) Hours. 30 tablet 5    ondansetron (ZOFRAN) 4 MG tablet TAKE 1 TABLET BY MOUTH EVERY 8 HOURS AS NEEDED FOR NAUSEA AND/OR VOMITING 12 tablet 0    prasugrel (EFFIENT) 10 MG tablet Take 1 tablet by mouth Daily. 30 tablet 11    sildenafil (VIAGRA) 25 MG tablet TAKE 2 TABLETS BY MOUTH DAILY AS NEEDED FOR ERECTILE DYSFUNCTION 90 tablet 0     No facility-administered encounter medications on file as of 3/29/2024.

## 2024-04-15 ENCOUNTER — TELEPHONE (OUTPATIENT)
Dept: FAMILY MEDICINE CLINIC | Facility: CLINIC | Age: 64
End: 2024-04-15

## 2024-04-15 NOTE — TELEPHONE ENCOUNTER
Caller: Sadiq Aldana    Relationship: Self    Best call back number:     [phone] (Mobile)       What was the call regarding: [facility] SENT A REQUEST OVER TO CHANGE TRULICITY DUE TO SHORTAGES       Is it okay if the provider responds through MyChart: CALL FIRST BEFORE PRESCRIBING ANYTHING, HE WOULD LIKE TO DISCUSS

## 2024-04-16 DIAGNOSIS — Z79.4 TYPE 2 DIABETES MELLITUS WITHOUT COMPLICATION, WITH LONG-TERM CURRENT USE OF INSULIN: Primary | ICD-10-CM

## 2024-04-16 DIAGNOSIS — E11.9 TYPE 2 DIABETES MELLITUS WITHOUT COMPLICATION, WITH LONG-TERM CURRENT USE OF INSULIN: Primary | ICD-10-CM

## 2024-04-26 ENCOUNTER — TELEPHONE (OUTPATIENT)
Dept: FAMILY MEDICINE CLINIC | Facility: CLINIC | Age: 64
End: 2024-04-26

## 2024-04-26 NOTE — TELEPHONE ENCOUNTER
Caller: Sadiq Aldana    Relationship: Self    Best call back number: [phone]     What form or medical record are you requesting: PRIOR AUTHORIZATION    Who is requesting this form or medical record from you: TRULICITY (NOT ON MEDICATION LIST)        Additional notes:     PATIENT STATES THAT PHARMACY IS SUPPOSED TO BE SENDING OVER PAPERWORK FOR THIS MEDICATION TO GET THE PRIOR AUTHORIZATION FOR IT.

## 2024-05-14 ENCOUNTER — OFFICE VISIT (OUTPATIENT)
Dept: FAMILY MEDICINE CLINIC | Facility: CLINIC | Age: 64
End: 2024-05-14
Payer: COMMERCIAL

## 2024-05-14 VITALS
HEART RATE: 90 BPM | HEIGHT: 67 IN | BODY MASS INDEX: 30.61 KG/M2 | WEIGHT: 195 LBS | DIASTOLIC BLOOD PRESSURE: 70 MMHG | TEMPERATURE: 98.4 F | RESPIRATION RATE: 16 BRPM | SYSTOLIC BLOOD PRESSURE: 110 MMHG | OXYGEN SATURATION: 97 %

## 2024-05-14 DIAGNOSIS — Z79.4 TYPE 2 DIABETES MELLITUS WITHOUT COMPLICATION, WITH LONG-TERM CURRENT USE OF INSULIN: Primary | ICD-10-CM

## 2024-05-14 DIAGNOSIS — M72.2 PLANTAR FASCIITIS, BILATERAL: ICD-10-CM

## 2024-05-14 DIAGNOSIS — E11.9 TYPE 2 DIABETES MELLITUS WITHOUT COMPLICATION, WITH LONG-TERM CURRENT USE OF INSULIN: Primary | ICD-10-CM

## 2024-05-14 PROCEDURE — 99213 OFFICE O/P EST LOW 20 MIN: CPT | Performed by: NURSE PRACTITIONER

## 2024-05-14 NOTE — PROGRESS NOTES
Subjective     Sadiq Aldana is a 63 y.o.. male.     Patient here today for follow up on diabetes. Patient currently taking glargine Basaglar insulin 25 units daily, Synjardy XR 12.5-1000 mg 1 tablet by mouth 2 Times a Day. stopped Dulaglutide 4.5 MG/0.5ML 0.5 mL 1 Time Per Week at last office visit and went onto the mounjaro but not able to start due to lack of availability. Patient admitting he is eating cereal and milk for breakfast most morning. Patient having a diet Soda and bag of chips for lunch and eating a sandwich and salad at dinner. Patient stating his blood sugars are around 100 in am and over 200 in pm.        Diabetes  He has type 2 diabetes mellitus. MedicAlert identification noted. The initial diagnosis of diabetes was made 1988 years ago. Pertinent negatives for hypoglycemia include no confusion, headaches, speech difficulty or sweats. Associated symptoms include foot paresthesias. Pertinent negatives for diabetes include no blurred vision, no foot ulcerations, no polydipsia, no polyuria and no weight loss. Hypoglycemia complications include nocturnal hypoglycemia. Pertinent negatives for hypoglycemia complications include no blackouts, no hospitalization, no required assistance and no required glucagon injection. Symptoms are stable. He is compliant with treatment all of the time. He is currently taking insulin pre-breakfast. Insulin injections are given by patient. Rotation sites for injection include the abdominal wall. He is following a generally unhealthy diet. When asked about meal planning, he reported none. He participates in exercise intermittently. He monitors blood glucose at home 1-2 x per day. His highest blood glucose is 180-200 mg/dl. He sees a podiatrist. Eye exam is current.       The following portions of the patient's history were reviewed and updated as appropriate: allergies, current medications, past family history, past medical history, past social history, past surgical  history and problem list.    Past Medical History:   Diagnosis Date    CAD, multiple vessel 3/29/2024    Cholelithiasis     Removed    Depression 6/22    Cwife    Diabetes mellitus     Erectile dysfunction     GERD (gastroesophageal reflux disease)     Hyperlipidemia     Hypertension     Kidney lesion 10/26/2023    Obstructive sleep apnea syndrome     Peptic ulceration        Past Surgical History:   Procedure Laterality Date    CARDIAC CATHETERIZATION N/A 01/11/2023    Procedure: Left Heart Cath;  Surgeon: Jacque Camp MD;  Location:  [facility];  Service: Cardiology;  Laterality: N/A;    CARDIAC CATHETERIZATION N/A 01/11/2023    Procedure: Coronary angiography;  Surgeon: Jacque Camp MD;  Location:  [facility];  Service: Cardiology;  Laterality: N/A;    CARDIAC CATHETERIZATION N/A 01/11/2023    Procedure: Left ventriculography;  Surgeon: Jacque Camp MD;  Location:  [facility];  Service: Cardiology;  Laterality: N/A;    CARDIAC CATHETERIZATION N/A 01/11/2023    Procedure: Percutaneous Coronary Intervention;  Surgeon: Jacque Camp MD;  [facility];  Service: Cardiology;  Laterality: N/A;    CARDIAC CATHETERIZATION N/A 01/11/2023    Procedure: Stent EUGENIO coronary;  Surgeon: Jacque Camp MD;  Location:  [facility];  Service: Cardiology;  Laterality: N/A;    CARDIAC CATHETERIZATION N/A 09/19/2023    Procedure: Left Heart Cath;  Surgeon: Mary Orta MD;  [facility];  Service: Cardiovascular;  Laterality: N/A;    CARDIAC CATHETERIZATION N/A 09/19/2023    Procedure: Coronary angiography;  Surgeon: Mary Orta MD;  Location:  [facility];  Service: Cardiovascular;  Laterality: N/A;    CARDIAC CATHETERIZATION N/A 09/19/2023    Procedure: Percutaneous Coronary Intervention;  Surgeon: Mary Orta MD;  [facility];  Service:  "Cardiovascular;  Laterality: N/A;    CARDIAC CATHETERIZATION N/A 09/19/2023    Procedure: Optical Coherence Tomography;  Surgeon: Mary Orta MD;  [facility];  Service: Cardiovascular;  Laterality: N/A;    CARDIAC CATHETERIZATION N/A 09/19/2023    Procedure: Stent EUGENIO coronary;  Surgeon: Mary Orta MD;  Location:  [facility];  Service: Cardiovascular;  Laterality: N/A;    CARDIAC CATHETERIZATION N/A 1/18/2024    Procedure: Left Heart Cath;  Surgeon: Devon Cristobal MD;  [facility];  Service: Cardiovascular;  Laterality: N/A;  Abnormal stress test    CARDIAC CATHETERIZATION N/A 1/18/2024    Procedure: Coronary angiography;  Surgeon: Devon Cristobal MD;  [facility];  Service: Cardiovascular;  Laterality: N/A;    CARDIAC CATHETERIZATION N/A 1/18/2024    Procedure: Resting Full Cycle Ratio;  Surgeon: Devon Cristobal MD;  Location: [facility] CATH INVASIVE LOCATION;  Service: Cardiovascular;  Laterality: N/A;    CHOLECYSTECTOMY      COLONOSCOPY      CORONARY STENT PLACEMENT  1/112023    FRACTURE SURGERY      SHOULDER ARTHROSCOPY Bilateral     removal of bone spurs    UPPER GASTROINTESTINAL ENDOSCOPY         Review of Systems   Constitutional:  Negative for weight loss.   Eyes:  Negative for blurred vision.   Endocrine: Negative for polydipsia and polyuria.   Skin:         Heel pain   Neurological:  Negative for speech difficulty and headaches.   Psychiatric/Behavioral:  Negative for confusion.        No Known Allergies    Objective     Vitals:    05/14/24 1533   BP: 110/70   Pulse: 90   Resp: 16   Temp: 98.4 °F (36.9 °C)   SpO2: 97%   Weight: 88.5 kg (195 lb)   Height: 170.2 cm (67.01\")     Body mass index is 30.53 kg/m².    Physical Exam  Vitals reviewed.   HENT:      Head: Normocephalic.   Eyes:      Pupils: Pupils are equal, round, and reactive to light.   Cardiovascular:      Rate and Rhythm: Normal rate and regular " rhythm.      Pulses:           Dorsalis pedis pulses are 2+ on the right side and 2+ on the left side.        Posterior tibial pulses are 2+ on the right side and 2+ on the left side.   Pulmonary:      Effort: Pulmonary effort is normal.      Breath sounds: Normal breath sounds.   Musculoskeletal:         General: Normal range of motion.      Right foot: Normal range of motion. No deformity or foot drop.      Left foot: Normal range of motion. No deformity or foot drop.   Feet:      Right foot:      Skin integrity: No ulcer, blister or skin breakdown.      Left foot:      Skin integrity: No ulcer, blister or skin breakdown.      Comments: Diabetes microfilament testing wnl  Skin:     General: Skin is warm and dry.   Neurological:      Mental Status: He is alert and oriented to person, place, and time.   Psychiatric:         Behavior: Behavior normal.           Current Outpatient Medications:     acetaminophen (TYLENOL) 325 MG tablet, Take 2 tablets by mouth Every 6 (Six) Hours As Needed for Mild Pain., Disp: 30 tablet, Rfl: 0    aspirin 81 MG EC tablet, Take 1 tablet by mouth Daily., Disp: 90 tablet, Rfl: 4    atorvastatin (Lipitor) 40 MG tablet, Take 1 tablet by mouth Daily., Disp: 90 tablet, Rfl: 1    cholecalciferol (VITAMIN D3) 10 MCG (400 UNIT) tablet, Take 1 tablet by mouth Daily., Disp: 90 tablet, Rfl: 4    Continuous Blood Gluc Sensor (FreeStyle Eulalio 14 Day Sensor) misc, USE SENSOR TO CHECK BLOOD SUGAR THREE TIMES A DAY. CHANGE EVERY 14 DAYS, Disp: 2 each, Rfl: 11    Empagliflozin-metFORMIN HCl ER (Synjardy XR) 12.5-1000 MG tablet sustained-release 24 hour, Take 1 tablet by mouth 2 (Two) Times a Day., Disp: 180 tablet, Rfl: 1    esomeprazole (nexIUM) 40 MG capsule, Take 1 capsule by mouth 2 (Two) Times a Day., Disp: 180 capsule, Rfl: 3    Insulin Glargine (BASAGLAR KWIKPEN) 100 UNIT/ML injection pen, INJECT 25 UNITS UNDER THE SKIN INTO THE APPROPRIATE AREA AS DIRECTED DAILY., Disp: 3 mL, Rfl: 1     lisinopril (PRINIVIL,ZESTRIL) 10 MG tablet, Take 1 tablet by mouth Daily., Disp: 90 tablet, Rfl: 1    metoprolol tartrate (LOPRESSOR) 25 MG tablet, Take 0.5 tablets by mouth Every 12 (Twelve) Hours., Disp: 30 tablet, Rfl: 5    ondansetron (ZOFRAN) 4 MG tablet, TAKE 1 TABLET BY MOUTH EVERY 8 HOURS AS NEEDED FOR NAUSEA AND/OR VOMITING, Disp: 12 tablet, Rfl: 0    prasugrel (EFFIENT) 10 MG tablet, Take 1 tablet by mouth Daily., Disp: 30 tablet, Rfl: 11    sildenafil (VIAGRA) 25 MG tablet, TAKE 2 TABLETS BY MOUTH DAILY AS NEEDED FOR ERECTILE DYSFUNCTION, Disp: 90 tablet, Rfl: 0    Tirzepatide (MOUNJARO) 5 MG/0.5ML solution pen-injector pen, Inject 0.5 mL under the skin into the appropriate area as directed 1 (One) Time Per Week. Stopping Trulicity; replacing with Mounjaro (Patient not taking: Reported on 5/14/2024), Disp: 6 mL, Rfl: 1    Recent Results (from the past 2016 hour(s))   Hemoglobin A1c    Collection Time: 05/11/24  9:24 AM    Specimen: Blood   Result Value Ref Range    Hemoglobin A1C 8.1 (H) 4.8 - 5.6 %   Basic metabolic panel    Collection Time: 05/11/24  9:24 AM    Specimen: Blood   Result Value Ref Range    Glucose 83 70 - 99 mg/dL    BUN 14 8 - 27 mg/dL    Creatinine 0.98 0.76 - 1.27 mg/dL    EGFR Result 87 >59 mL/min/1.73    BUN/Creatinine Ratio 14 10 - 24    Sodium 143 134 - 144 mmol/L    Potassium 4.7 3.5 - 5.2 mmol/L    Chloride 104 96 - 106 mmol/L    Total CO2 24 20 - 29 mmol/L    Calcium 9.4 8.6 - 10.2 mg/dL         Diagnoses and all orders for this visit:    1. Type 2 diabetes mellitus without complication, with long-term current use of insulin (Primary)    2. Plantar fasciitis, bilateral  -     Ambulatory Referral to Physical Therapy        Patient Instructions   Eat a heart healthy low sugar low carb diet  Drink plenty of water with goal 64 oz a day  Exercise 3-5 times a week, for more than 30 minutes at a time  Discussed and informed of stretching exercises for feet to help with plantar  fascitis; if no improvement will refer to physical therapy.  Discussed shoes and orthotics to help with plantar fascitis.   Discussed epsom salt warm soaks.  Continue glargine Basaglar insulin 25 units daily, Synjardy XR 12.5-1000 mg 1 tablet by mouth 2 Times a Day and continue Dulaglutide 0.5 mL 1 Time Per week until mounjaro available to allow for switch. If not availble by one month will discuss switching. Patient to call.     Return if symptoms worsen or fail to improve, for fasting labs in 3 months, recheck in 3 months.    Answers submitted by the patient for this visit:  Primary Reason for Visit (Submitted on 5/14/2024)  What is the primary reason for your visit?: Diabetes  Diabetes Questionnaire (Submitted on 5/14/2024)  Chief Complaint: Diabetes problem  Below 70: occasionally

## 2024-05-17 NOTE — PATIENT INSTRUCTIONS
Eat a heart healthy low sugar low carb diet  Drink plenty of water with goal 64 oz a day  Exercise 3-5 times a week, for more than 30 minutes at a time  Discussed and informed of stretching exercises for feet to help with plantar fascitis; if no improvement will refer to physical therapy.  Discussed shoes and orthotics to help with plantar fascitis.   Discussed epsom salt warm soaks.  Continue glargine Basaglar insulin 25 units daily, Synjardy XR 12.5-1000 mg 1 tablet by mouth 2 Times a Day and continue Dulaglutide 0.5 mL 1 Time Per week until mounjaro available to allow for switch. If not availble by one month will discuss switching. Patient to call.

## 2024-05-20 ENCOUNTER — TELEPHONE (OUTPATIENT)
Dept: GASTROENTEROLOGY | Facility: CLINIC | Age: 64
End: 2024-05-20

## 2024-05-20 NOTE — TELEPHONE ENCOUNTER
Caller: Sadiq Aldana    Relationship to patient: Self    Best call back number: [phone]     Patient is needing: PATIENT WOULD LIKE A CALLBACK ASAP TO GET SCHEDULED. PATIENT SAYS HE HAS LEFT A COUPLE OF MESSAGES ON THE SCHEDULING LINE. PLEASE FOLLOW UP TO GET COLONOSCOPY & EGD SCHEDULED.

## 2024-05-22 PROBLEM — Z12.11 COLON CANCER SCREENING: Status: ACTIVE | Noted: 2023-12-07

## 2024-07-26 DIAGNOSIS — E78.2 MIXED HYPERLIPIDEMIA: ICD-10-CM

## 2024-07-26 DIAGNOSIS — E11.9 TYPE 2 DIABETES MELLITUS WITHOUT COMPLICATION, WITH LONG-TERM CURRENT USE OF INSULIN: ICD-10-CM

## 2024-07-26 DIAGNOSIS — Z79.4 TYPE 2 DIABETES MELLITUS WITHOUT COMPLICATION, WITH LONG-TERM CURRENT USE OF INSULIN: ICD-10-CM

## 2024-07-26 DIAGNOSIS — I10 PRIMARY HYPERTENSION: ICD-10-CM

## 2024-07-26 RX ORDER — ATORVASTATIN CALCIUM 40 MG/1
40 TABLET, FILM COATED ORAL DAILY
Qty: 90 TABLET | Refills: 1 | Status: SHIPPED | OUTPATIENT
Start: 2024-07-26

## 2024-07-26 RX ORDER — LISINOPRIL 10 MG/1
10 TABLET ORAL DAILY
Qty: 90 TABLET | Refills: 1 | Status: SHIPPED | OUTPATIENT
Start: 2024-07-26

## 2024-07-26 RX ORDER — INSULIN GLARGINE 100 [IU]/ML
25 INJECTION, SOLUTION SUBCUTANEOUS DAILY
Qty: 3 ML | Refills: 1 | Status: SHIPPED | OUTPATIENT
Start: 2024-07-26

## 2024-07-26 NOTE — TELEPHONE ENCOUNTER
Caller: Sadiq Aldana    Relationship: Self    Best call back number: [phone]     Requested Prescriptions:   Requested Prescriptions     Pending Prescriptions Disp Refills    atorvastatin (Lipitor) 40 MG tablet 90 tablet 1     Sig: Take 1 tablet by mouth Daily.    lisinopril (PRINIVIL,ZESTRIL) 10 MG tablet 90 tablet 1     Sig: Take 1 tablet by mouth Daily.    Insulin Glargine (BASAGLAR KWIKPEN) 100 UNIT/ML injection pen 3 mL 1     Sig: Inject 25 Units under the skin into the appropriate area as directed Daily.        Pharmacy where request should be sent: [facility]/PHARMACY #[id number] [facility] [address] [facility]. AT [facility] [phone] [facility] [phone] FX     Last office visit with prescribing clinician: 5/14/2024   Last telemedicine visit with prescribing clinician: Visit date not found   Next office visit with prescribing clinician: 8/20/2024     Additional details provided by patient: PATIENT IS LOW ON THESE MEDICATIONS.     Does the patient have less than a 3 day supply:  [x] Yes  [] No    Would you like a call back once the refill request has been completed: [] Yes [x] No    If the office needs to give you a call back, can they leave a voicemail: [x] Yes [] No    Tommie Rascon Rep   07/26/24 09:17 EDT

## 2024-08-07 DIAGNOSIS — Z79.4 TYPE 2 DIABETES MELLITUS WITHOUT COMPLICATION, WITH LONG-TERM CURRENT USE OF INSULIN: ICD-10-CM

## 2024-08-07 DIAGNOSIS — E11.9 TYPE 2 DIABETES MELLITUS WITHOUT COMPLICATION, WITH LONG-TERM CURRENT USE OF INSULIN: ICD-10-CM

## 2024-08-07 RX ORDER — EMPAGLIFLOZIN, METFORMIN HYDROCHLORIDE 12.5; 1 MG/1; MG/1
1 TABLET, EXTENDED RELEASE ORAL 2 TIMES DAILY
Qty: 180 TABLET | Refills: 1 | Status: SHIPPED | OUTPATIENT
Start: 2024-08-07

## 2024-08-20 ENCOUNTER — OFFICE VISIT (OUTPATIENT)
Dept: FAMILY MEDICINE CLINIC | Facility: CLINIC | Age: 64
End: 2024-08-20
Payer: COMMERCIAL

## 2024-08-20 ENCOUNTER — TELEPHONE (OUTPATIENT)
Dept: FAMILY MEDICINE CLINIC | Facility: CLINIC | Age: 64
End: 2024-08-20

## 2024-08-20 VITALS
TEMPERATURE: 97.3 F | HEIGHT: 67 IN | OXYGEN SATURATION: 94 % | WEIGHT: 192.5 LBS | SYSTOLIC BLOOD PRESSURE: 122 MMHG | DIASTOLIC BLOOD PRESSURE: 70 MMHG | HEART RATE: 94 BPM | BODY MASS INDEX: 30.21 KG/M2 | RESPIRATION RATE: 14 BRPM

## 2024-08-20 DIAGNOSIS — E11.9 TYPE 2 DIABETES MELLITUS WITHOUT COMPLICATION, WITH LONG-TERM CURRENT USE OF INSULIN: ICD-10-CM

## 2024-08-20 DIAGNOSIS — R10.11 RIGHT UPPER QUADRANT ABDOMINAL PAIN: ICD-10-CM

## 2024-08-20 DIAGNOSIS — E78.2 MIXED HYPERLIPIDEMIA: ICD-10-CM

## 2024-08-20 DIAGNOSIS — I10 PRIMARY HYPERTENSION: ICD-10-CM

## 2024-08-20 DIAGNOSIS — R91.1 PULMONARY NODULE: ICD-10-CM

## 2024-08-20 DIAGNOSIS — Z12.5 PROSTATE CANCER SCREENING: ICD-10-CM

## 2024-08-20 DIAGNOSIS — Z79.4 TYPE 2 DIABETES MELLITUS WITHOUT COMPLICATION, WITH LONG-TERM CURRENT USE OF INSULIN: ICD-10-CM

## 2024-08-20 DIAGNOSIS — Z00.00 ANNUAL PHYSICAL EXAM: Primary | ICD-10-CM

## 2024-08-20 RX ORDER — DULAGLUTIDE 4.5 MG/.5ML
INJECTION, SOLUTION SUBCUTANEOUS
COMMUNITY
Start: 2024-08-02 | End: 2024-08-21

## 2024-08-20 RX ORDER — INSULIN GLARGINE 100 [IU]/ML
INJECTION, SOLUTION SUBCUTANEOUS
COMMUNITY
Start: 2024-08-13 | End: 2024-08-20

## 2024-08-20 NOTE — PROGRESS NOTES
"Vernon Aldana is a 63 y.o. male. Patient is here today for   Chief Complaint   Patient presents with    Annual Exam    Diabetes          Vitals:    08/20/24 1448   BP: 122/70   Pulse: 94   Resp: 14   Temp: 97.3 °F (36.3 °C)   TempSrc: Temporal   SpO2: 94%   Weight: 87.3 kg (192 lb 8 oz)   Height: 170.2 cm (67.01\")      Body mass index is 30.14 kg/m².    The following portions of the patient's history were reviewed and updated as appropriate: allergies, current medications, past family history, past medical history, past social history, past surgical history and problem list.    Past Medical History:   Diagnosis Date    CAD, multiple vessel 3/29/2024    Cholelithiasis     Removed    Depression 6/22    Cwife    Diabetes mellitus     Erectile dysfunction     GERD (gastroesophageal reflux disease)     Hyperlipidemia     Hypertension     Kidney lesion 10/26/2023    Obstructive sleep apnea syndrome     Peptic ulceration       No Known Allergies   Social History     Socioeconomic History    Marital status:    Tobacco Use    Smoking status: Never    Smokeless tobacco: Never   Vaping Use    Vaping status: Never Used   Substance and Sexual Activity    Alcohol use: Not Currently     Comment: Less than 2 a month    Drug use: Never    Sexual activity: Yes     Partners: Female     Birth control/protection: Condom, Pill, None        Current Outpatient Medications:     aspirin 81 MG EC tablet, Take 1 tablet by mouth Daily., Disp: 90 tablet, Rfl: 4    atorvastatin (Lipitor) 40 MG tablet, Take 1 tablet by mouth Daily., Disp: 90 tablet, Rfl: 1    cholecalciferol (VITAMIN D3) 10 MCG (400 UNIT) tablet, Take 1 tablet by mouth Daily., Disp: 90 tablet, Rfl: 4    Continuous Blood Gluc Sensor (FreeStyle Eulalio 14 Day Sensor) misc, USE SENSOR TO CHECK BLOOD SUGAR THREE TIMES A DAY. CHANGE EVERY 14 DAYS, Disp: 2 each, Rfl: 11    Empagliflozin-metFORMIN HCl ER (Synjardy XR) 12.5-1000 MG tablet sustained-release 24 hour, TAKE " 1 TABLET BY MOUTH TWICE A DAY, Disp: 180 tablet, Rfl: 1    esomeprazole (nexIUM) 40 MG capsule, Take 1 capsule by mouth 2 (Two) Times a Day., Disp: 180 capsule, Rfl: 3    Insulin Glargine (BASAGLAR KWIKPEN) 100 UNIT/ML injection pen, Inject 25 Units under the skin into the appropriate area as directed Daily. (Patient taking differently: Inject 32 Units under the skin into the appropriate area as directed Daily.), Disp: 3 mL, Rfl: 1    lisinopril (PRINIVIL,ZESTRIL) 10 MG tablet, Take 1 tablet by mouth Daily., Disp: 90 tablet, Rfl: 1    ondansetron (ZOFRAN) 4 MG tablet, TAKE 1 TABLET BY MOUTH EVERY 8 HOURS AS NEEDED FOR NAUSEA AND/OR VOMITING, Disp: 12 tablet, Rfl: 0    prasugrel (EFFIENT) 10 MG tablet, Take 1 tablet by mouth Daily., Disp: 30 tablet, Rfl: 11    sildenafil (VIAGRA) 25 MG tablet, TAKE 2 TABLETS BY MOUTH DAILY AS NEEDED FOR ERECTILE DYSFUNCTION, Disp: 90 tablet, Rfl: 0    Trulicity 4.5 MG/0.5ML solution pen-injector, INJECT 0.5 ML SUBCUTANEOUSLY INTO THE APPROPRIATE AREA AS DIRECTED ONCE WEEKLY, Disp: , Rfl:      Last Colonoscopy: has not been done due to Effient    ECG Report : 9/26/23 SR    Objective   History of Present Illness  Patient here today for annual physical.      Sadiq Aldana 63 y.o. male who presents for an Annual Wellness Visit.  he has a history of   Patient Active Problem List   Diagnosis    Type 2 diabetes mellitus without complication, with long-term current use of insulin    Mixed hyperlipidemia    Gastroesophageal reflux disease    Acute left-sided low back pain with left-sided sciatica    Routine adult health maintenance    Artificial lens present    Onychomycosis    Hypertension    Chest pain    Unstable angina    Chest pain, unspecified type    Pulmonary nodule    Hilar lymphadenopathy    Hepatic steatosis    Lesion of spleen    Kidney lesion    Hiatal hernia    Abnormal nuclear stress test    CAD, multiple vessel    Plantar fasciitis, bilateral    Colon cancer screening   .  he  has been doing well with new interval problems.  Labs results discussed in detail with the patient.  Plan to update vaccines if needed today.    Health Habits:  Dental Exam. up to date; late last year  Eye Exam. up to date; this year  Exercise: 5 times/week.  Current exercise activities include: walking 5,00-6,000  Diet: vegetarian diet mostly  Water: throughout day  Diet [place] green tea    Preventative counseling  Discussed face to face the importance of healthy diet and exercise.       Recent Results (from the past 336 hour(s))   CBC & Differential    Collection Time: 08/20/24  3:21 PM    Specimen: Blood   Result Value Ref Range    WBC 7.7 3.4 - 10.8 x10E3/uL    RBC 4.75 4.14 - 5.80 x10E6/uL    Hemoglobin 14.0 13.0 - 17.7 g/dL    Hematocrit 42.2 37.5 - 51.0 %    MCV 89 79 - 97 fL    MCH 29.5 26.6 - 33.0 pg    MCHC 33.2 31.5 - 35.7 g/dL    RDW 12.9 11.6 - 15.4 %    Platelets 331 150 - 450 x10E3/uL    Neutrophil Rel % 69 Not Estab. %    Lymphocyte Rel % 21 Not Estab. %    Monocyte Rel % 8 Not Estab. %    Eosinophil Rel % 1 Not Estab. %    Basophil Rel % 1 Not Estab. %    Neutrophils Absolute 5.3 1.4 - 7.0 x10E3/uL    Lymphocytes Absolute 1.6 0.7 - 3.1 x10E3/uL    Monocytes Absolute 0.6 0.1 - 0.9 x10E3/uL    Eosinophils Absolute 0.1 0.0 - 0.4 x10E3/uL    Basophils Absolute 0.0 0.0 - 0.2 x10E3/uL    Immature Granulocyte Rel % 0 Not Estab. %    Immature Grans Absolute 0.0 0.0 - 0.1 x10E3/uL   Lipid Panel With LDL / HDL Ratio    Collection Time: 08/20/24  3:21 PM    Specimen: Blood   Result Value Ref Range    Total Cholesterol 161 100 - 199 mg/dL    Triglycerides 180 (H) 0 - 149 mg/dL    HDL Cholesterol 52 >39 mg/dL    VLDL Cholesterol Mookie 30 5 - 40 mg/dL    LDL Chol Calc (NIH) 79 0 - 99 mg/dL    LDL/HDL RATIO 1.5 0.0 - 3.6 ratio   Comprehensive Metabolic Panel    Collection Time: 08/20/24  3:21 PM    Specimen: Blood   Result Value Ref Range    Glucose 134 (H) 70 - 99 mg/dL    BUN 12 8 - 27 mg/dL    Creatinine 0.89  0.76 - 1.27 mg/dL    EGFR Result 96 >59 mL/min/1.73    BUN/Creatinine Ratio 13 10 - 24    Sodium 139 134 - 144 mmol/L    Potassium 4.4 3.5 - 5.2 mmol/L    Chloride 99 96 - 106 mmol/L    Total CO2 24 20 - 29 mmol/L    Calcium 9.6 8.6 - 10.2 mg/dL    Total Protein 6.1 6.0 - 8.5 g/dL    Albumin 4.3 3.9 - 4.9 g/dL    Globulin 1.8 1.5 - 4.5 g/dL    Total Bilirubin 0.4 0.0 - 1.2 mg/dL    Alkaline Phosphatase 75 44 - 121 IU/L    AST (SGOT) 20 0 - 40 IU/L    ALT (SGPT) 16 0 - 44 IU/L   Hemoglobin A1c    Collection Time: 08/20/24  3:21 PM    Specimen: Blood   Result Value Ref Range    Hemoglobin A1C 8.0 (H) 4.8 - 5.6 %   PSA SCREENING    Collection Time: 08/20/24  3:21 PM    Specimen: Blood   Result Value Ref Range    PSA 1.0 0.0 - 4.0 ng/mL   Unable To Void    Collection Time: 08/20/24  3:21 PM   Result Value Ref Range    Unable to Void Comment         Review of Systems   Constitutional: Negative.    Respiratory: Negative.     Cardiovascular: Negative.    Gastrointestinal: Negative.    Genitourinary: Negative.    Musculoskeletal: Negative.    Neurological: Negative.    Psychiatric/Behavioral: Negative.         Physical Exam  Constitutional:       Appearance: Normal appearance. He is well-developed.   HENT:      Head: Normocephalic and atraumatic.      Right Ear: Tympanic membrane normal. Tympanic membrane is not erythematous.      Left Ear: Tympanic membrane normal. Tympanic membrane is not erythematous.      Nose: Nose normal.   Eyes:      Conjunctiva/sclera: Conjunctivae normal.      Pupils: Pupils are equal, round, and reactive to light.   Neck:      Thyroid: No thyromegaly.      Vascular: No carotid bruit.      Trachea: No tracheal deviation.   Cardiovascular:      Rate and Rhythm: Normal rate and regular rhythm.      Pulses: Normal pulses.      Heart sounds: Normal heart sounds. No murmur heard.  Pulmonary:      Effort: No accessory muscle usage or respiratory distress.      Breath sounds: Normal breath sounds. No  stridor. No decreased breath sounds, wheezing, rhonchi or rales.   Abdominal:      General: Bowel sounds are normal. There is no distension.      Palpations: Abdomen is soft. Abdomen is not rigid. There is no mass.      Tenderness: There is no abdominal tenderness. There is no guarding or rebound.      Hernia: No hernia is present.   Musculoskeletal:         General: Normal range of motion.      Cervical back: Normal range of motion and neck supple.   Lymphadenopathy:      Cervical: No cervical adenopathy.   Skin:     General: Skin is warm and dry.      Capillary Refill: Capillary refill takes less than 2 seconds.   Neurological:      Mental Status: He is alert and oriented to person, place, and time.      Cranial Nerves: No cranial nerve deficit.      Sensory: No sensory deficit.      Motor: No abnormal muscle tone.      Coordination: Coordination normal.   Psychiatric:         Speech: Speech normal.         Behavior: Behavior normal.         ASSESSMENT       Problems Addressed this Visit          Cardiac and Vasculature    Mixed hyperlipidemia    Hypertension       Endocrine and Metabolic    Type 2 diabetes mellitus without complication, with long-term current use of insulin    Relevant Medications    Trulicity 4.5 MG/0.5ML solution pen-injector    Other Relevant Orders    Comprehensive Metabolic Panel (Completed)    Hemoglobin A1c (Completed)    MicroAlbumin, Urine, Random - Urine, Clean Catch       Pulmonary and Pneumonias    Pulmonary nodule     Other Visit Diagnoses       Annual physical exam    -  Primary    Relevant Orders    CBC & Differential (Completed)    Lipid Panel With LDL / HDL Ratio (Completed)    Right upper quadrant abdominal pain        Relevant Orders    US Abdomen Complete    Prostate cancer screening        Relevant Orders    PSA SCREENING (Completed)          Diagnoses         Codes Comments    Annual physical exam    -  Primary ICD-10-CM: Z00.00  ICD-9-CM: V70.0     Type 2 diabetes mellitus  without complication, with long-term current use of insulin     ICD-10-CM: E11.9, Z79.4  ICD-9-CM: 250.00, V58.67     Primary hypertension     ICD-10-CM: I10  ICD-9-CM: 401.9     Mixed hyperlipidemia     ICD-10-CM: E78.2  ICD-9-CM: 272.2     Pulmonary nodule     ICD-10-CM: R91.1  ICD-9-CM: 793.11     Right upper quadrant abdominal pain     ICD-10-CM: R10.11  ICD-9-CM: 789.01     Prostate cancer screening     ICD-10-CM: Z12.5  ICD-9-CM: V76.44                 PLAN    Patient Instructions   Diabetes: Continue glargine Basaglar insulin 25 units daily, Synjardy XR 12.5-1000 mg 1 tablet by mouth 2 Times a Day and Trulicity 0.5 mL once per week. 10/4/23 diabetes eye exam done at [facility], no dm retinopathy. Continue to eat a heart healthy low sugar diet.    Hypertension: continue lisinopril 10 mg 1 tab daily     Hyperlipidemia: continue atorvastatin 40 mg 1 tab daily; ordered labs today.    Pulmonary nodule: following up with Dr. Griffin, [facility]    Right upper quad: ordering abd u/s for further eval.     Return for fasting labs in 6 months, recheck in 6 months.

## 2024-08-21 DIAGNOSIS — E11.65 TYPE 2 DIABETES MELLITUS WITH HYPERGLYCEMIA, WITH LONG-TERM CURRENT USE OF INSULIN: Primary | ICD-10-CM

## 2024-08-21 DIAGNOSIS — Z79.4 TYPE 2 DIABETES MELLITUS WITH HYPERGLYCEMIA, WITH LONG-TERM CURRENT USE OF INSULIN: Primary | ICD-10-CM

## 2024-08-21 LAB
ALBUMIN SERPL-MCNC: 4.3 G/DL (ref 3.9–4.9)
ALP SERPL-CCNC: 75 IU/L (ref 44–121)
ALT SERPL-CCNC: 16 IU/L (ref 0–44)
AST SERPL-CCNC: 20 IU/L (ref 0–40)
BASOPHILS # BLD AUTO: 0 X10E3/UL (ref 0–0.2)
BASOPHILS NFR BLD AUTO: 1 %
BILIRUB SERPL-MCNC: 0.4 MG/DL (ref 0–1.2)
BUN SERPL-MCNC: 12 MG/DL (ref 8–27)
BUN/CREAT SERPL: 13 (ref 10–24)
CALCIUM SERPL-MCNC: 9.6 MG/DL (ref 8.6–10.2)
CHLORIDE SERPL-SCNC: 99 MMOL/L (ref 96–106)
CHOLEST SERPL-MCNC: 161 MG/DL (ref 100–199)
CO2 SERPL-SCNC: 24 MMOL/L (ref 20–29)
CREAT SERPL-MCNC: 0.89 MG/DL (ref 0.76–1.27)
EGFRCR SERPLBLD CKD-EPI 2021: 96 ML/MIN/1.73
EOSINOPHIL # BLD AUTO: 0.1 X10E3/UL (ref 0–0.4)
EOSINOPHIL NFR BLD AUTO: 1 %
ERYTHROCYTE [DISTWIDTH] IN BLOOD BY AUTOMATED COUNT: 12.9 % (ref 11.6–15.4)
GLOBULIN SER CALC-MCNC: 1.8 G/DL (ref 1.5–4.5)
GLUCOSE SERPL-MCNC: 134 MG/DL (ref 70–99)
HBA1C MFR BLD: 8 % (ref 4.8–5.6)
HCT VFR BLD AUTO: 42.2 % (ref 37.5–51)
HDLC SERPL-MCNC: 52 MG/DL
HGB BLD-MCNC: 14 G/DL (ref 13–17.7)
IMM GRANULOCYTES # BLD AUTO: 0 X10E3/UL (ref 0–0.1)
IMM GRANULOCYTES NFR BLD AUTO: 0 %
LDLC SERPL CALC-MCNC: 79 MG/DL (ref 0–99)
LDLC/HDLC SERPL: 1.5 RATIO (ref 0–3.6)
LYMPHOCYTES # BLD AUTO: 1.6 X10E3/UL (ref 0.7–3.1)
LYMPHOCYTES NFR BLD AUTO: 21 %
MCH RBC QN AUTO: 29.5 PG (ref 26.6–33)
MCHC RBC AUTO-ENTMCNC: 33.2 G/DL (ref 31.5–35.7)
MCV RBC AUTO: 89 FL (ref 79–97)
MONOCYTES # BLD AUTO: 0.6 X10E3/UL (ref 0.1–0.9)
MONOCYTES NFR BLD AUTO: 8 %
NEUTROPHILS # BLD AUTO: 5.3 X10E3/UL (ref 1.4–7)
NEUTROPHILS NFR BLD AUTO: 69 %
PLATELET # BLD AUTO: 331 X10E3/UL (ref 150–450)
POTASSIUM SERPL-SCNC: 4.4 MMOL/L (ref 3.5–5.2)
PROT SERPL-MCNC: 6.1 G/DL (ref 6–8.5)
PSA SERPL-MCNC: 1 NG/ML (ref 0–4)
RBC # BLD AUTO: 4.75 X10E6/UL (ref 4.14–5.8)
SODIUM SERPL-SCNC: 139 MMOL/L (ref 134–144)
TRIGL SERPL-MCNC: 180 MG/DL (ref 0–149)
UNABLE TO VOID: NORMAL
VLDLC SERPL CALC-MCNC: 30 MG/DL (ref 5–40)
WBC # BLD AUTO: 7.7 X10E3/UL (ref 3.4–10.8)

## 2024-08-21 RX ORDER — DULAGLUTIDE 3 MG/.5ML
3 INJECTION, SOLUTION SUBCUTANEOUS WEEKLY
Qty: 0.5 ML | Refills: 3 | Status: SHIPPED | OUTPATIENT
Start: 2024-08-21

## 2024-08-21 NOTE — PATIENT INSTRUCTIONS
Diabetes: Continue glargine Basaglar insulin 25 units daily, Synjardy XR 12.5-1000 mg 1 tablet by mouth 2 Times a Day and Trulicity 0.5 mL once per week. 10/4/23 diabetes eye exam done at [facility], no dm retinopathy. Continue to eat a heart healthy low sugar diet.    Hypertension: continue lisinopril 10 mg 1 tab daily     Hyperlipidemia: continue atorvastatin 40 mg 1 tab daily; ordered labs today.    Pulmonary nodule: following up with Dr. Griffin, [facility]    Right upper quad: ordering abd u/s for further eval.

## 2024-08-30 ENCOUNTER — TELEPHONE (OUTPATIENT)
Dept: FAMILY MEDICINE CLINIC | Facility: CLINIC | Age: 64
End: 2024-08-30
Payer: COMMERCIAL

## 2024-08-30 DIAGNOSIS — Z79.4 TYPE 2 DIABETES MELLITUS WITHOUT COMPLICATION, WITH LONG-TERM CURRENT USE OF INSULIN: Primary | ICD-10-CM

## 2024-08-30 DIAGNOSIS — E11.9 TYPE 2 DIABETES MELLITUS WITHOUT COMPLICATION, WITH LONG-TERM CURRENT USE OF INSULIN: Primary | ICD-10-CM

## 2024-08-30 RX ORDER — DULAGLUTIDE 4.5 MG/.5ML
0.5 INJECTION, SOLUTION SUBCUTANEOUS WEEKLY
Qty: 6 ML | Refills: 1 | Status: SHIPPED | OUTPATIENT
Start: 2024-08-30

## 2024-08-30 NOTE — TELEPHONE ENCOUNTER
Patient's prescription of Dulaglutide (Trulicity) 3 MG/0.5ML solution pen-injector is wrong. He says he takes 4.5mg. Please advise.

## 2024-08-31 LAB — MICROALBUMIN UR-MCNC: 4.9 UG/ML

## 2024-09-09 DIAGNOSIS — I10 PRIMARY HYPERTENSION: ICD-10-CM

## 2024-09-09 DIAGNOSIS — E11.9 TYPE 2 DIABETES MELLITUS WITHOUT COMPLICATION, WITH LONG-TERM CURRENT USE OF INSULIN: ICD-10-CM

## 2024-09-09 DIAGNOSIS — K21.9 GASTROESOPHAGEAL REFLUX DISEASE, UNSPECIFIED WHETHER ESOPHAGITIS PRESENT: Chronic | ICD-10-CM

## 2024-09-09 DIAGNOSIS — Z79.4 TYPE 2 DIABETES MELLITUS WITHOUT COMPLICATION, WITH LONG-TERM CURRENT USE OF INSULIN: ICD-10-CM

## 2024-09-09 DIAGNOSIS — E78.2 MIXED HYPERLIPIDEMIA: ICD-10-CM

## 2024-09-09 RX ORDER — ATORVASTATIN CALCIUM 40 MG/1
40 TABLET, FILM COATED ORAL DAILY
Qty: 90 TABLET | Refills: 1 | Status: SHIPPED | OUTPATIENT
Start: 2024-09-09

## 2024-09-09 RX ORDER — FLASH GLUCOSE SENSOR
1 KIT MISCELLANEOUS
Qty: 2 EACH | Refills: 11 | Status: SHIPPED | OUTPATIENT
Start: 2024-09-09

## 2024-09-09 RX ORDER — EMPAGLIFLOZIN, METFORMIN HYDROCHLORIDE 12.5; 1 MG/1; MG/1
1 TABLET, EXTENDED RELEASE ORAL 2 TIMES DAILY
Qty: 180 TABLET | Refills: 1 | Status: SHIPPED | OUTPATIENT
Start: 2024-09-09

## 2024-09-09 RX ORDER — SILDENAFIL 25 MG/1
25 TABLET, FILM COATED ORAL AS NEEDED
Qty: 90 TABLET | Refills: 0 | Status: SHIPPED | OUTPATIENT
Start: 2024-09-09

## 2024-09-09 RX ORDER — INSULIN GLARGINE 100 [IU]/ML
25 INJECTION, SOLUTION SUBCUTANEOUS DAILY
Qty: 45 ML | Refills: 3 | Status: SHIPPED | OUTPATIENT
Start: 2024-09-09

## 2024-09-09 RX ORDER — ESOMEPRAZOLE MAGNESIUM 40 MG/1
40 CAPSULE, DELAYED RELEASE ORAL 2 TIMES DAILY
Qty: 180 CAPSULE | Refills: 3 | Status: SHIPPED | OUTPATIENT
Start: 2024-09-09

## 2024-09-09 RX ORDER — LISINOPRIL 10 MG/1
10 TABLET ORAL DAILY
Qty: 90 TABLET | Refills: 1 | Status: SHIPPED | OUTPATIENT
Start: 2024-09-09

## 2024-09-09 RX ORDER — INSULIN GLARGINE 100 [IU]/ML
32 INJECTION, SOLUTION SUBCUTANEOUS DAILY
Qty: 45 ML | Refills: 3 | Status: SHIPPED | OUTPATIENT
Start: 2024-09-09 | End: 2024-09-09 | Stop reason: SDUPTHER

## 2024-09-09 RX ORDER — DULAGLUTIDE 4.5 MG/.5ML
0.5 INJECTION, SOLUTION SUBCUTANEOUS WEEKLY
Qty: 6 ML | Refills: 1 | Status: SHIPPED | OUTPATIENT
Start: 2024-09-09

## 2024-09-27 ENCOUNTER — OFFICE VISIT (OUTPATIENT)
Age: 64
End: 2024-09-27
Payer: COMMERCIAL

## 2024-09-27 ENCOUNTER — HOSPITAL ENCOUNTER (OUTPATIENT)
Dept: ULTRASOUND IMAGING | Facility: HOSPITAL | Age: 64
Discharge: HOME | End: 2024-09-27
Admitting: NURSE PRACTITIONER
Payer: COMMERCIAL

## 2024-09-27 VITALS
DIASTOLIC BLOOD PRESSURE: 72 MMHG | OXYGEN SATURATION: 97 % | SYSTOLIC BLOOD PRESSURE: 114 MMHG | HEART RATE: 95 BPM | HEIGHT: 67 IN | BODY MASS INDEX: 29.82 KG/M2 | WEIGHT: 190 LBS

## 2024-09-27 DIAGNOSIS — E78.2 MIXED HYPERLIPIDEMIA: ICD-10-CM

## 2024-09-27 DIAGNOSIS — I25.10 CAD, MULTIPLE VESSEL: Primary | ICD-10-CM

## 2024-09-27 DIAGNOSIS — I10 PRIMARY HYPERTENSION: ICD-10-CM

## 2024-09-27 DIAGNOSIS — R10.11 RIGHT UPPER QUADRANT ABDOMINAL PAIN: ICD-10-CM

## 2024-09-27 PROCEDURE — 99214 OFFICE O/P EST MOD 30 MIN: CPT | Performed by: STUDENT IN AN ORGANIZED HEALTH CARE EDUCATION/TRAINING PROGRAM

## 2024-09-27 PROCEDURE — 76700 US EXAM ABDOM COMPLETE: CPT

## 2024-09-27 PROCEDURE — 93000 ELECTROCARDIOGRAM COMPLETE: CPT | Performed by: STUDENT IN AN ORGANIZED HEALTH CARE EDUCATION/TRAINING PROGRAM

## 2024-09-27 RX ORDER — CLOPIDOGREL BISULFATE 75 MG/1
75 TABLET ORAL DAILY
Qty: 90 TABLET | Refills: 3 | Status: SHIPPED | OUTPATIENT
Start: 2024-09-27

## 2024-10-07 DIAGNOSIS — E11.9 TYPE 2 DIABETES MELLITUS WITHOUT COMPLICATION, WITH LONG-TERM CURRENT USE OF INSULIN: ICD-10-CM

## 2024-10-07 DIAGNOSIS — Z79.4 TYPE 2 DIABETES MELLITUS WITHOUT COMPLICATION, WITH LONG-TERM CURRENT USE OF INSULIN: ICD-10-CM

## 2024-10-07 RX ORDER — INSULIN GLARGINE 100 [IU]/ML
25 INJECTION, SOLUTION SUBCUTANEOUS DAILY
Qty: 45 ML | Refills: 3 | Status: SHIPPED | OUTPATIENT
Start: 2024-10-07

## 2024-11-21 NOTE — PROGRESS NOTES
Subjective     Sadiq Aldana is a 63 y.o.. male.     Patient here today for follow up on diabetes, hypertension, and right upper quad pain.     9/27/24 abd u/s results showed that the common bile duct was not dilated, measuring 5.2 mm. The pancreas was not optimally seen but no gross abnormalities are seen in the region of the pancreas. The liver appears within normal limits. Right kidney measures 9.5 cm in length. Left kidney measures 10.3 cm in length. There is an approximately 2.0 cm benign-appearing right renal cyst. Spleen is not enlarged. Aorta and IVC are not dilated. Status post cholecystectomy. Right renal cyst. No other significant findings are noted.    10/4/23 diabetes eye exam done [facility], no dm retinopathy.     Patient c/o left low back, hip and left leg pain for the past 2 weeks. Patient stating she has been taking otc NSAIDS without relief.     Patient stating he is eating healthy, drinking water during day, not drinking any sodas/coffee/monster drinks and is not eating much red meat. Patient stating his stomach has been doing better, right upper quad pain is better. Patient stating right upper quad pain comes and goes and he is not vomiting as much now as previously.         The following portions of the patient's history were reviewed and updated as appropriate: allergies, current medications, past family history, past medical history, past social history, past surgical history and problem list.    Past Medical History:   Diagnosis Date    CAD, multiple vessel 3/29/2024    Cholelithiasis     Removed    Depression 6/22    Cwife    Diabetes mellitus     Erectile dysfunction     GERD (gastroesophageal reflux disease)     Hyperlipidemia     Hypertension     Kidney lesion 10/26/2023    Obstructive sleep apnea syndrome     Peptic ulceration        Past Surgical History:   Procedure Laterality Date    CARDIAC CATHETERIZATION N/A 01/11/2023    Procedure: Left Heart Cath;  Surgeon: Conrado  MD Jacque;  Location:  [facility];  Service: Cardiology;  Laterality: N/A;    CARDIAC CATHETERIZATION N/A 01/11/2023    Procedure: Coronary angiography;  Surgeon: Jacque Camp MD;  Location:  [facility];  Service: Cardiology;  Laterality: N/A;    CARDIAC CATHETERIZATION N/A 01/11/2023    Procedure: Left ventriculography;  Surgeon: Jacque Camp MD;  Location:  [facility];  Service: Cardiology;  Laterality: N/A;    CARDIAC CATHETERIZATION N/A 01/11/2023    Procedure: Percutaneous Coronary Intervention;  Surgeon: Jacque Camp MD;  Location:  [facility];  Service: Cardiology;  Laterality: N/A;    CARDIAC CATHETERIZATION N/A 01/11/2023    Procedure: Stent EUGENIO coronary;  Surgeon: Jacque Camp MD;  [facility];  Service: Cardiology;  Laterality: N/A;    CARDIAC CATHETERIZATION N/A 09/19/2023    Procedure: Left Heart Cath;  Surgeon: Mary Orta MD;  [facility];  Service: Cardiovascular;  Laterality: N/A;    CARDIAC CATHETERIZATION N/A 09/19/2023    Procedure: Coronary angiography;  Surgeon: Mary Orta MD;  Location:  [facility];  Service: Cardiovascular;  Laterality: N/A;    CARDIAC CATHETERIZATION N/A 09/19/2023    Procedure: Percutaneous Coronary Intervention;  Surgeon: Mary Orta MD;  Location:  [facility];  Service: Cardiovascular;  Laterality: N/A;    CARDIAC CATHETERIZATION N/A 09/19/2023    Procedure: Optical Coherence Tomography;  Surgeon: Mary Orta MD;  Location:  [facility];  Service: Cardiovascular;  Laterality: N/A;    CARDIAC CATHETERIZATION N/A 09/19/2023    Procedure: Stent EUGENIO coronary;  Surgeon: Mary Orta MD;  [facility];  Service: Cardiovascular;  Laterality: N/A;    CARDIAC CATHETERIZATION N/A 1/18/2024    Procedure: Left Heart Cath;  Surgeon: Devon Cristobal  "MD;  Location: [facility] CATH INVASIVE LOCATION;  Service: Cardiovascular;  Laterality: N/A;  Abnormal stress test    CARDIAC CATHETERIZATION N/A 1/18/2024    Procedure: Coronary angiography;  Surgeon: Devon Cristobal MD;  Location: [facility] CATH INVASIVE LOCATION;  Service: Cardiovascular;  Laterality: N/A;    CARDIAC CATHETERIZATION N/A 1/18/2024    Procedure: Resting Full Cycle Ratio;  Surgeon: Devon Cristobal MD;  Location: [facility] CATH INVASIVE LOCATION;  Service: Cardiovascular;  Laterality: N/A;    CHOLECYSTECTOMY      COLONOSCOPY      CORONARY STENT PLACEMENT  1/112023    FRACTURE SURGERY      SHOULDER ARTHROSCOPY Bilateral     removal of bone spurs    UPPER GASTROINTESTINAL ENDOSCOPY         Review of Systems   Constitutional: Negative.    Respiratory: Negative.     Cardiovascular: Negative.    Gastrointestinal:         Improved   Musculoskeletal:  Positive for arthralgias (left hip and left leg) and back pain.   Neurological: Negative.    Psychiatric/Behavioral: Negative.         No Known Allergies    Objective     Vitals:    11/22/24 0946   BP: 114/70   BP Location: Right arm   Patient Position: Sitting   Cuff Size: Adult   Pulse: 78   Resp: 17   Temp: 97.4 °F (36.3 °C)   TempSrc: Temporal   SpO2: 98%   Weight: 83.9 kg (184 lb 14.4 oz)   Height: 170.2 cm (67.01\")     Body mass index is 28.95 kg/m².    Physical Exam  Vitals reviewed.   HENT:      Head: Normocephalic.   Eyes:      Pupils: Pupils are equal, round, and reactive to light.   Neck:      Vascular: No carotid bruit.   Cardiovascular:      Rate and Rhythm: Normal rate and regular rhythm.      Pulses: Normal pulses.   Pulmonary:      Effort: Pulmonary effort is normal.      Breath sounds: Normal breath sounds.   Musculoskeletal:         General: Normal range of motion.      Lumbar back: Tenderness present. No swelling, edema, lacerations or bony tenderness. Normal range of motion.      Right lower leg: No edema.      Left lower leg: No edema.   Feet:      " Comments: Diabetes microfilament testing done and wnl sebas.   Skin:     General: Skin is warm and dry.   Neurological:      Mental Status: He is alert and oriented to person, place, and time.   Psychiatric:         Behavior: Behavior normal.           Current Outpatient Medications:     atorvastatin (Lipitor) 40 MG tablet, Take 1 tablet by mouth Daily., Disp: 90 tablet, Rfl: 1    cholecalciferol (VITAMIN D3) 10 MCG (400 UNIT) tablet, Take 1 tablet by mouth Daily., Disp: 90 tablet, Rfl: 4    clopidogrel (PLAVIX) 75 MG tablet, Take 1 tablet by mouth Daily., Disp: 90 tablet, Rfl: 3    Continuous Glucose Sensor (FreeStyle Eulalio 14 Day Sensor) misc, Inject 1 each under the skin into the appropriate area as directed Every 14 (Fourteen) Days., Disp: 2 each, Rfl: 11    Empagliflozin-metFORMIN HCl ER (Synjardy XR) 12.5-1000 MG tablet sustained-release 24 hour, Take 1 tablet by mouth 2 (Two) Times a Day., Disp: 180 tablet, Rfl: 1    esomeprazole (nexIUM) 40 MG capsule, Take 1 capsule by mouth 2 (Two) Times a Day., Disp: 180 capsule, Rfl: 3    Insulin Glargine (BASAGLAR KWIKPEN) 100 UNIT/ML injection pen, Inject 25 Units under the skin into the appropriate area as directed Daily., Disp: 45 mL, Rfl: 3    lisinopril (PRINIVIL,ZESTRIL) 10 MG tablet, Take 1 tablet by mouth Daily., Disp: 90 tablet, Rfl: 1    ondansetron (ZOFRAN) 4 MG tablet, Take 1 tablet by mouth Every 8 (Eight) Hours As Needed for Nausea or Vomiting., Disp: 12 tablet, Rfl: 1    sildenafil (VIAGRA) 25 MG tablet, Take 1 tablet by mouth As Needed for Erectile Dysfunction., Disp: 90 tablet, Rfl: 0    Dulaglutide (Trulicity) 4.5 MG/0.5ML solution auto-injector, Inject 4.5 mg under the skin into the appropriate area as directed 1 (One) Time Per Week., Disp: 6 mL, Rfl: 1    methylPREDNISolone (MEDROL) 4 MG dose pack, Take as directed on package instructions., Disp: 1 each, Rfl: 0    Recent Results (from the past 12 weeks)   MicroAlbumin, Urine, Random - Urine, Clean  Catch    Collection Time: 08/30/24  3:21 PM    Specimen: Urine, Clean Catch   Result Value Ref Range    Microalbumin, Urine 4.9 Not Estab. ug/mL   Comprehensive Metabolic Panel    Collection Time: 11/16/24  8:32 AM    Specimen: Blood   Result Value Ref Range    Glucose 118 (H) 70 - 99 mg/dL    BUN 16 8 - 27 mg/dL    Creatinine 1.00 0.76 - 1.27 mg/dL    EGFR Result 85 >59 mL/min/1.73    BUN/Creatinine Ratio 16 10 - 24    Sodium 143 134 - 144 mmol/L    Potassium 5.2 3.5 - 5.2 mmol/L    Chloride 105 96 - 106 mmol/L    Total CO2 25 20 - 29 mmol/L    Calcium 9.5 8.6 - 10.2 mg/dL    Total Protein 6.4 6.0 - 8.5 g/dL    Albumin 4.5 3.9 - 4.9 g/dL    Globulin 1.9 1.5 - 4.5 g/dL    Total Bilirubin 0.5 0.0 - 1.2 mg/dL    Alkaline Phosphatase 74 44 - 121 IU/L    AST (SGOT) 15 0 - 40 IU/L    ALT (SGPT) 15 0 - 44 IU/L   Hemoglobin A1c    Collection Time: 11/16/24  8:32 AM    Specimen: Blood   Result Value Ref Range    Hemoglobin A1C 7.4 (H) 4.8 - 5.6 %   Unable To Void    Collection Time: 11/16/24  8:32 AM   Result Value Ref Range    Unable to Void Comment        US Abdomen Complete  Narrative: US ABDOMEN COMPLETE-9/27/2024     HISTORY: Right upper quadrant pain.     The gallbladder has been removed. The common bile duct is not dilated  measuring 5.2 mm. The pancreas is not optimally seen but no gross  abnormalities are seen in the region of the pancreas. The liver appears  within normal limits. Right kidney measures 9.5 cm in length. Left  kidney measures 10.3 cm in length. There is an approximately 2.0 cm  benign-appearing right renal cyst.     Spleen is not enlarged. Aorta and IVC are not dilated.     Impression: 1. Status post cholecystectomy.  2. Right renal cyst.  3. No other significant findings are noted.                    This report was finalized on 10/1/2024 1:24 PM by Dr. Jitendra Rice M.D on Workstation: [id number]           Diagnoses and all orders for this visit:    1. Type 2 diabetes mellitus without  complication, with long-term current use of insulin (Primary)  -     Insulin Glargine (BASAGLAR KWIKPEN) 100 UNIT/ML injection pen; Inject 25 Units under the skin into the appropriate area as directed Daily.  Dispense: 45 mL; Refill: 3  -     Empagliflozin-metFORMIN HCl ER (Synjardy XR) 12.5-1000 MG tablet sustained-release 24 hour; Take 1 tablet by mouth 2 (Two) Times a Day.  Dispense: 180 tablet; Refill: 1    2. Primary hypertension  -     lisinopril (PRINIVIL,ZESTRIL) 10 MG tablet; Take 1 tablet by mouth Daily.  Dispense: 90 tablet; Refill: 1    3. Mixed hyperlipidemia  -     atorvastatin (Lipitor) 40 MG tablet; Take 1 tablet by mouth Daily.  Dispense: 90 tablet; Refill: 1    4. Acute left-sided low back pain with left-sided sciatica  -     methylPREDNISolone (MEDROL) 4 MG dose pack; Take as directed on package instructions.  Dispense: 1 each; Refill: 0    5. Nausea and vomiting, unspecified vomiting type  Comments:  after eating meat  Orders:  -     ondansetron (ZOFRAN) 4 MG tablet; Take 1 tablet by mouth Every 8 (Eight) Hours As Needed for Nausea or Vomiting.  Dispense: 12 tablet; Refill: 1    6. Need for pneumococcal 20-valent conjugate vaccination  -     Pneumococcal Conjugate Vaccine 20-Valent (PCV20)    7. Need for influenza vaccination  -     Fluzone >6mos ([id number])    Other orders  -     Dulaglutide (Trulicity) 4.5 MG/0.5ML solution auto-injector; Inject 4.5 mg under the skin into the appropriate area as directed 1 (One) Time Per Week.  Dispense: 6 mL; Refill: 1        Patient Instructions   Diabetes: HgA1c 7.4, improved from previous 8. Continue glargine Basaglar insulin 25 units daily, Synjardy XR 12.5-1000 mg 1 tablet by mouth 2 Times a Day and Trulicity 0.5 mL once per week. Continue to work on diet. Follow up in 6 months for fasting labs and follow up.     Hypertension: stable, continue lisinopril 10 mg 1 tab daily    Hyperlipidemia: stable as of 8/20/24, continue atorvastatin 40 mg daily.    Acute  left side low back/left sciatica: Patient leaves tomorrow for a 2 week vacation in [facility]; recommend stretching exercises daily; giving Patient a paper script for medrol dose pack to be taken if not improved. recommend cold compress/ice pack 10-15 minutes at a time several times a day, warm compress/heating pad 10-15 minutes at at time several times a day, and over the counter biofreeze/lidocaine patches as needed (wash off from skin before using heating pad).    Return if symptoms worsen or fail to improve, for fasting labs in 6 months, recheck in 6 months.

## 2024-11-22 ENCOUNTER — OFFICE VISIT (OUTPATIENT)
Dept: FAMILY MEDICINE CLINIC | Facility: CLINIC | Age: 64
End: 2024-11-22
Payer: COMMERCIAL

## 2024-11-22 VITALS
RESPIRATION RATE: 17 BRPM | HEIGHT: 67 IN | DIASTOLIC BLOOD PRESSURE: 70 MMHG | TEMPERATURE: 97.4 F | WEIGHT: 184.9 LBS | OXYGEN SATURATION: 98 % | HEART RATE: 78 BPM | SYSTOLIC BLOOD PRESSURE: 114 MMHG | BODY MASS INDEX: 29.02 KG/M2

## 2024-11-22 DIAGNOSIS — Z23 NEED FOR PNEUMOCOCCAL 20-VALENT CONJUGATE VACCINATION: ICD-10-CM

## 2024-11-22 DIAGNOSIS — I10 PRIMARY HYPERTENSION: ICD-10-CM

## 2024-11-22 DIAGNOSIS — Z23 NEED FOR INFLUENZA VACCINATION: ICD-10-CM

## 2024-11-22 DIAGNOSIS — M54.42 ACUTE LEFT-SIDED LOW BACK PAIN WITH LEFT-SIDED SCIATICA: ICD-10-CM

## 2024-11-22 DIAGNOSIS — E78.2 MIXED HYPERLIPIDEMIA: ICD-10-CM

## 2024-11-22 DIAGNOSIS — R11.2 NAUSEA AND VOMITING, UNSPECIFIED VOMITING TYPE: ICD-10-CM

## 2024-11-22 DIAGNOSIS — Z79.4 TYPE 2 DIABETES MELLITUS WITHOUT COMPLICATION, WITH LONG-TERM CURRENT USE OF INSULIN: Primary | ICD-10-CM

## 2024-11-22 DIAGNOSIS — E11.9 TYPE 2 DIABETES MELLITUS WITHOUT COMPLICATION, WITH LONG-TERM CURRENT USE OF INSULIN: Primary | ICD-10-CM

## 2024-11-22 RX ORDER — ATORVASTATIN CALCIUM 40 MG/1
40 TABLET, FILM COATED ORAL DAILY
Qty: 90 TABLET | Refills: 1 | Status: SHIPPED | OUTPATIENT
Start: 2024-11-22

## 2024-11-22 RX ORDER — DULAGLUTIDE 4.5 MG/.5ML
4.5 INJECTION, SOLUTION SUBCUTANEOUS WEEKLY
Qty: 6 ML | Refills: 1 | Status: SHIPPED | OUTPATIENT
Start: 2024-11-22

## 2024-11-22 RX ORDER — METHYLPREDNISOLONE 4 MG/1
TABLET ORAL
Qty: 1 EACH | Refills: 0 | Status: SHIPPED | OUTPATIENT
Start: 2024-11-22

## 2024-11-22 RX ORDER — ONDANSETRON 4 MG/1
4 TABLET, FILM COATED ORAL EVERY 8 HOURS PRN
Qty: 12 TABLET | Refills: 1 | Status: SHIPPED | OUTPATIENT
Start: 2024-11-22

## 2024-11-22 RX ORDER — CLOPIDOGREL BISULFATE 75 MG/1
75 TABLET ORAL DAILY
Qty: 90 TABLET | Refills: 3 | Status: CANCELLED | OUTPATIENT
Start: 2024-11-22

## 2024-11-22 RX ORDER — INSULIN GLARGINE 100 [IU]/ML
25 INJECTION, SOLUTION SUBCUTANEOUS DAILY
Qty: 45 ML | Refills: 3 | Status: SHIPPED | OUTPATIENT
Start: 2024-11-22

## 2024-11-22 RX ORDER — OMEGA-3S/DHA/EPA/FISH OIL/D3 300MG-1000
400 CAPSULE ORAL DAILY
Qty: 90 TABLET | Refills: 4 | Status: CANCELLED | OUTPATIENT
Start: 2024-11-22

## 2024-11-22 RX ORDER — EMPAGLIFLOZIN, METFORMIN HYDROCHLORIDE 12.5; 1 MG/1; MG/1
1 TABLET, EXTENDED RELEASE ORAL 2 TIMES DAILY
Qty: 180 TABLET | Refills: 1 | Status: SHIPPED | OUTPATIENT
Start: 2024-11-22

## 2024-11-22 RX ORDER — LISINOPRIL 10 MG/1
10 TABLET ORAL DAILY
Qty: 90 TABLET | Refills: 1 | Status: SHIPPED | OUTPATIENT
Start: 2024-11-22

## 2024-11-22 NOTE — PATIENT INSTRUCTIONS
Diabetes: HgA1c 7.4, improved from previous 8. Continue glargine Basaglar insulin 25 units daily, Synjardy XR 12.5-1000 mg 1 tablet by mouth 2 Times a Day and Trulicity 0.5 mL once per week. Continue to work on diet. Follow up in 6 months for fasting labs and follow up.     Hypertension: stable, continue lisinopril 10 mg 1 tab daily    Hyperlipidemia: stable as of 8/20/24, continue atorvastatin 40 mg daily.    Acute left side low back/left sciatica: Patient leaves tomorrow for a 2 week vacation in [facility]; recommend stretching exercises daily; giving Patient a paper script for medrol dose pack to be taken if not improved. recommend cold compress/ice pack 10-15 minutes at a time several times a day, warm compress/heating pad 10-15 minutes at at time several times a day, and over the counter biofreeze/lidocaine patches as needed (wash off from skin before using heating pad).

## 2024-12-18 NOTE — Clinical Note
Inserted under fluoro. Quality 130: Documentation Of Current Medications In The Medical Record: Current Medications Documented Detail Level: Detailed

## 2025-02-10 ENCOUNTER — HOSPITAL ENCOUNTER (OUTPATIENT)
Dept: CT IMAGING | Facility: HOSPITAL | Age: 65
Discharge: HOME | End: 2025-02-10
Admitting: HOSPITALIST
Payer: COMMERCIAL

## 2025-02-10 DIAGNOSIS — R91.1 LUNG NODULE: ICD-10-CM

## 2025-02-10 PROCEDURE — 71250 CT THORAX DX C-: CPT

## 2025-02-19 ENCOUNTER — TRANSCRIBE ORDERS (OUTPATIENT)
Dept: ADMINISTRATIVE | Facility: HOSPITAL | Age: 65
End: 2025-02-19
Payer: COMMERCIAL

## 2025-02-19 DIAGNOSIS — R91.1 NODULE OF LOWER LOBE OF RIGHT LUNG: Primary | ICD-10-CM

## 2025-03-12 DIAGNOSIS — E11.9 TYPE 2 DIABETES MELLITUS WITHOUT COMPLICATION, WITH LONG-TERM CURRENT USE OF INSULIN: ICD-10-CM

## 2025-03-12 DIAGNOSIS — Z79.4 TYPE 2 DIABETES MELLITUS WITHOUT COMPLICATION, WITH LONG-TERM CURRENT USE OF INSULIN: ICD-10-CM

## 2025-03-12 DIAGNOSIS — E78.2 MIXED HYPERLIPIDEMIA: ICD-10-CM

## 2025-03-12 DIAGNOSIS — I10 PRIMARY HYPERTENSION: ICD-10-CM

## 2025-03-12 DIAGNOSIS — K21.9 GASTROESOPHAGEAL REFLUX DISEASE, UNSPECIFIED WHETHER ESOPHAGITIS PRESENT: Chronic | ICD-10-CM

## 2025-03-12 NOTE — TELEPHONE ENCOUNTER
Caller: Sadiq Aldana    Relationship: Self    Best call back number: [phone]     Requested Prescriptions:   Requested Prescriptions     Pending Prescriptions Disp Refills    lisinopril (PRINIVIL,ZESTRIL) 10 MG tablet 90 tablet 1     Sig: Take 1 tablet by mouth Daily.    atorvastatin (Lipitor) 40 MG tablet 90 tablet 1     Sig: Take 1 tablet by mouth Daily.    Dulaglutide (Trulicity) 4.5 MG/0.5ML solution auto-injector 6 mL 1     Sig: Inject 4.5 mg under the skin into the appropriate area as directed 1 (One) Time Per Week.    sildenafil (VIAGRA) 25 MG tablet 90 tablet 0     Sig: Take 1 tablet by mouth As Needed for Erectile Dysfunction.    Continuous Glucose Sensor (FreeStyle Eulalio 14 Day Sensor) misc 2 each 11     Sig: Inject 1 each under the skin into the appropriate area as directed Every 14 (Fourteen) Days.    Insulin Glargine (BASAGLAR KWIKPEN) 100 UNIT/ML injection pen 45 mL 3     Sig: Inject 25 Units under the skin into the appropriate area as directed Daily.    Empagliflozin-metFORMIN HCl ER (Synjardy XR) 12.5-1000 MG tablet sustained-release 24 hour 180 tablet 1     Sig: Take 1 tablet by mouth 2 (Two) Times a Day.    clopidogrel (PLAVIX) 75 MG tablet 90 tablet 3     Sig: Take 1 tablet by mouth Daily.    esomeprazole (nexIUM) 40 MG capsule 180 capsule 3     Sig: Take 1 capsule by mouth 2 (Two) Times a Day.      Pharmacy where request should be sent: [facility]/PHARMACY #[id number] [place], [place] - [address] AT [facility] [phone] [facility] [phone] FX     Last office visit with prescribing clinician: 11/22/2024   Last telemedicine visit with prescribing clinician: Visit date not found   Next office visit with prescribing clinician: 5/30/2025     Additional details provided by patient: PATIENT STATED HE IS OUT OF SILDENAFIL.    PATIENT STATED HE IS LOW ON ATORVASTATIN.    Does the patient have less than a 3 day supply:  [x] Yes  [] No    Would you like a call back once the refill  request has been completed: [] Yes [x] No    If the office needs to give you a call back, can they leave a voicemail: [] Yes [x] No    Tommie Zimmerman Rep   03/12/25 14:47 EDT

## 2025-04-03 ENCOUNTER — TRANSCRIBE ORDERS (OUTPATIENT)
Dept: ADMINISTRATIVE | Facility: HOSPITAL | Age: 65
End: 2025-04-03
Payer: COMMERCIAL

## 2025-04-03 DIAGNOSIS — S32.2XXD: Primary | ICD-10-CM

## 2025-04-03 DIAGNOSIS — S32.10XD: Primary | ICD-10-CM

## 2025-04-03 DIAGNOSIS — S34.3XXD: Primary | ICD-10-CM

## 2025-04-08 RX ORDER — EMPAGLIFLOZIN, METFORMIN HYDROCHLORIDE 12.5; 1 MG/1; MG/1
1 TABLET, EXTENDED RELEASE ORAL 2 TIMES DAILY
Qty: 180 TABLET | Refills: 1 | Status: SHIPPED | OUTPATIENT
Start: 2025-04-08

## 2025-04-08 RX ORDER — DULAGLUTIDE 4.5 MG/.5ML
4.5 INJECTION, SOLUTION SUBCUTANEOUS WEEKLY
Qty: 6 ML | Refills: 1 | Status: SHIPPED | OUTPATIENT
Start: 2025-04-08

## 2025-04-08 RX ORDER — CLOPIDOGREL BISULFATE 75 MG/1
75 TABLET ORAL DAILY
Qty: 90 TABLET | Refills: 3 | Status: SHIPPED | OUTPATIENT
Start: 2025-04-08

## 2025-04-08 RX ORDER — INSULIN GLARGINE 100 [IU]/ML
25 INJECTION, SOLUTION SUBCUTANEOUS DAILY
Qty: 45 ML | Refills: 3 | Status: SHIPPED | OUTPATIENT
Start: 2025-04-08

## 2025-04-08 RX ORDER — LISINOPRIL 10 MG/1
10 TABLET ORAL DAILY
Qty: 90 TABLET | Refills: 1 | Status: SHIPPED | OUTPATIENT
Start: 2025-04-08

## 2025-04-08 RX ORDER — ATORVASTATIN CALCIUM 40 MG/1
40 TABLET, FILM COATED ORAL DAILY
Qty: 90 TABLET | Refills: 1 | Status: SHIPPED | OUTPATIENT
Start: 2025-04-08

## 2025-04-08 RX ORDER — FLASH GLUCOSE SENSOR
1 KIT MISCELLANEOUS
Qty: 2 EACH | Refills: 11 | Status: SHIPPED | OUTPATIENT
Start: 2025-04-08

## 2025-04-08 RX ORDER — SILDENAFIL 25 MG/1
25 TABLET, FILM COATED ORAL AS NEEDED
Qty: 90 TABLET | Refills: 0 | Status: SHIPPED | OUTPATIENT
Start: 2025-04-08

## 2025-04-08 RX ORDER — ESOMEPRAZOLE MAGNESIUM 40 MG/1
40 CAPSULE, DELAYED RELEASE ORAL 2 TIMES DAILY
Qty: 180 CAPSULE | Refills: 3 | Status: SHIPPED | OUTPATIENT
Start: 2025-04-08

## 2025-04-08 NOTE — TELEPHONE ENCOUNTER
PATIENT CALLED BACK, HAS NOT HEARD ANYTHING BACK SINCE THIS MESSAGE WAS SENT ON MARCH 12TH. PLEASE ADVISE AND CONTACT PATIENT. BEST PHONE # [phone]

## 2025-04-16 ENCOUNTER — HOSPITAL ENCOUNTER (OUTPATIENT)
Dept: CT IMAGING | Facility: HOSPITAL | Age: 65
Discharge: HOME | End: 2025-04-16
Admitting: EMERGENCY MEDICINE
Payer: OTHER MISCELLANEOUS

## 2025-04-16 DIAGNOSIS — S32.10XD: ICD-10-CM

## 2025-04-16 DIAGNOSIS — S34.3XXD: ICD-10-CM

## 2025-04-16 DIAGNOSIS — S32.2XXD: ICD-10-CM

## 2025-04-16 PROCEDURE — 72192 CT PELVIS W/O DYE: CPT

## 2025-05-08 ENCOUNTER — TELEPHONE (OUTPATIENT)
Dept: FAMILY MEDICINE CLINIC | Facility: CLINIC | Age: 65
End: 2025-05-08

## 2025-05-08 DIAGNOSIS — E78.2 MIXED HYPERLIPIDEMIA: ICD-10-CM

## 2025-05-08 DIAGNOSIS — E11.9 TYPE 2 DIABETES MELLITUS WITHOUT COMPLICATION, WITH LONG-TERM CURRENT USE OF INSULIN: ICD-10-CM

## 2025-05-08 DIAGNOSIS — I10 PRIMARY HYPERTENSION: Primary | ICD-10-CM

## 2025-05-08 DIAGNOSIS — Z79.4 TYPE 2 DIABETES MELLITUS WITHOUT COMPLICATION, WITH LONG-TERM CURRENT USE OF INSULIN: ICD-10-CM

## 2025-05-08 NOTE — TELEPHONE ENCOUNTER
Caller: Prema Aldanan    Relationship: Self    Best call back number: [phone]     What orders are you requesting (i.e. lab or imaging):      In what timeframe would the patient need to come in:      Where will you receive your lab/imaging services:      Additional notes:  PATIENT CALLED HE HAS AN FOLLOW UP APPOINTMENT ON 5/30/25 AND STATES HE NEEDS LABS DONE PRIOR.  THERE IS NO LABS ORDERED.  WHAT LABS DOES NEELA KAY NEED HIM TO HAVE DONE AND CAN SHE ORDER THEM.  HE IS ALREADY SCHEDULED FOR [facility] ON [employer] ON 5/23/25 BUT NEED THE ORDERS TO BE IN THE SYSTEM AND RELEASED PRIOR TO HIS VISIT.

## 2025-05-30 ENCOUNTER — OFFICE VISIT (OUTPATIENT)
Dept: FAMILY MEDICINE CLINIC | Facility: CLINIC | Age: 65
End: 2025-05-30
Payer: COMMERCIAL

## 2025-05-30 VITALS
BODY MASS INDEX: 28.36 KG/M2 | DIASTOLIC BLOOD PRESSURE: 65 MMHG | HEIGHT: 67 IN | TEMPERATURE: 98.2 F | OXYGEN SATURATION: 95 % | RESPIRATION RATE: 16 BRPM | HEART RATE: 98 BPM | SYSTOLIC BLOOD PRESSURE: 120 MMHG | WEIGHT: 180.7 LBS

## 2025-05-30 DIAGNOSIS — E78.2 MIXED HYPERLIPIDEMIA: ICD-10-CM

## 2025-05-30 DIAGNOSIS — Z79.4 TYPE 2 DIABETES MELLITUS WITHOUT COMPLICATION, WITH LONG-TERM CURRENT USE OF INSULIN: ICD-10-CM

## 2025-05-30 DIAGNOSIS — E11.9 TYPE 2 DIABETES MELLITUS WITHOUT COMPLICATION, WITH LONG-TERM CURRENT USE OF INSULIN: ICD-10-CM

## 2025-05-30 DIAGNOSIS — L08.9 SKIN INFLAMMATION: ICD-10-CM

## 2025-05-30 DIAGNOSIS — K21.9 GASTROESOPHAGEAL REFLUX DISEASE, UNSPECIFIED WHETHER ESOPHAGITIS PRESENT: Chronic | ICD-10-CM

## 2025-05-30 DIAGNOSIS — I10 PRIMARY HYPERTENSION: Primary | ICD-10-CM

## 2025-05-30 DIAGNOSIS — K59.1 FUNCTIONAL DIARRHEA: ICD-10-CM

## 2025-05-30 DIAGNOSIS — Z12.11 SCREEN FOR COLON CANCER: ICD-10-CM

## 2025-05-30 RX ORDER — LISINOPRIL 10 MG/1
10 TABLET ORAL DAILY
Qty: 90 TABLET | Refills: 3 | Status: SHIPPED | OUTPATIENT
Start: 2025-05-30

## 2025-05-30 RX ORDER — TRIAMCINOLONE ACETONIDE 0.25 MG/G
1 OINTMENT TOPICAL 2 TIMES DAILY
Qty: 15 G | Refills: 0 | Status: SHIPPED | OUTPATIENT
Start: 2025-05-30 | End: 2025-06-13

## 2025-05-30 RX ORDER — INSULIN GLARGINE 100 [IU]/ML
32 INJECTION, SOLUTION SUBCUTANEOUS DAILY
Qty: 45 ML | Refills: 3 | Status: SHIPPED | OUTPATIENT
Start: 2025-05-30

## 2025-05-30 RX ORDER — EMPAGLIFLOZIN, METFORMIN HYDROCHLORIDE 12.5; 1 MG/1; MG/1
1 TABLET, EXTENDED RELEASE ORAL 2 TIMES DAILY
Qty: 180 TABLET | Refills: 3 | Status: SHIPPED | OUTPATIENT
Start: 2025-05-30

## 2025-05-30 RX ORDER — ESOMEPRAZOLE MAGNESIUM 40 MG/1
40 CAPSULE, DELAYED RELEASE ORAL 2 TIMES DAILY
Qty: 180 CAPSULE | Refills: 3 | Status: CANCELLED | OUTPATIENT
Start: 2025-05-30

## 2025-05-30 RX ORDER — ATORVASTATIN CALCIUM 40 MG/1
40 TABLET, FILM COATED ORAL DAILY
Qty: 90 TABLET | Refills: 3 | Status: SHIPPED | OUTPATIENT
Start: 2025-05-30

## 2025-05-30 RX ORDER — CHOLESTYRAMINE 4 G/9G
1 POWDER, FOR SUSPENSION ORAL 2 TIMES DAILY WITH MEALS
Qty: 60 PACKET | Refills: 5 | Status: SHIPPED | OUTPATIENT
Start: 2025-05-30

## 2025-05-30 NOTE — PROGRESS NOTES
Subjective     Sadiq Aldana is a 64 y.o.. male.     Patient here today for follow up on diabetes, hypertension, and hyperlipidemia.    Patient stating he is not eating healthy, has been able to eat more meat without vomiting, meat causing more burping and hiccupping. Patient stating he is taking Pepto bismol occasionally. Patient stating he has been on light due for past month due to fracture of S5 vertebrae and coccyx. Patient stating he has been more sedentary. Patient stating his blood sugar ranges have been under 100 in am, 150-170 in pm.      The following portions of the patient's history were reviewed and updated as appropriate: allergies, current medications, past family history, past medical history, past social history, past surgical history and problem list.    Past Medical History:   Diagnosis Date    CAD, multiple vessel 3/29/2024    Cholelithiasis     Removed    Depression 6/22    Cwife    Diabetes mellitus     Erectile dysfunction     GERD (gastroesophageal reflux disease)     Hyperlipidemia     Hypertension     Kidney lesion 10/26/2023    Obstructive sleep apnea syndrome     Peptic ulceration        Past Surgical History:   Procedure Laterality Date    CARDIAC CATHETERIZATION N/A 01/11/2023    Procedure: Left Heart Cath;  Surgeon: Jacque Camp MD;  Location:  [facility];  Service: Cardiology;  Laterality: N/A;    CARDIAC CATHETERIZATION N/A 01/11/2023    Procedure: Coronary angiography;  Surgeon: Jacque Camp MD;  Location:  [facility];  Service: Cardiology;  Laterality: N/A;    CARDIAC CATHETERIZATION N/A 01/11/2023    Procedure: Left ventriculography;  Surgeon: Jacque Camp MD;  Location:  [facility];  Service: Cardiology;  Laterality: N/A;    CARDIAC CATHETERIZATION N/A 01/11/2023    Procedure: Percutaneous Coronary Intervention;  Surgeon: Jacque Camp MD;  Location:  [facility];  Service: Cardiology;   Laterality: N/A;    CARDIAC CATHETERIZATION N/A 01/11/2023    Procedure: Stent EUGENIO coronary;  Surgeon: Jacque Camp MD;  Location: [facility];  Service: Cardiology;  Laterality: N/A;    CARDIAC CATHETERIZATION N/A 09/19/2023    Procedure: Left Heart Cath;  Surgeon: Mary Orta MD;  Location:  [facility];  Service: Cardiovascular;  Laterality: N/A;    CARDIAC CATHETERIZATION N/A 09/19/2023    Procedure: Coronary angiography;  Surgeon: Mary Orta MD;  Location:  [facility];  Service: Cardiovascular;  Laterality: N/A;    CARDIAC CATHETERIZATION N/A 09/19/2023    Procedure: Percutaneous Coronary Intervention;  Surgeon: Mary Orta MD;  [facility];  Service: Cardiovascular;  Laterality: N/A;    CARDIAC CATHETERIZATION N/A 09/19/2023    Procedure: Optical Coherence Tomography;  Surgeon: Mary Orta MD;  [facility];  Service: Cardiovascular;  Laterality: N/A;    CARDIAC CATHETERIZATION N/A 09/19/2023    Procedure: Stent EUGENIO coronary;  Surgeon: Mary Orta MD;  [facility];  Service: Cardiovascular;  Laterality: N/A;    CARDIAC CATHETERIZATION N/A 1/18/2024    Procedure: Left Heart Cath;  Surgeon: Devon Cristobal MD;  [facility];  Service: Cardiovascular;  Laterality: N/A;  Abnormal stress test    CARDIAC CATHETERIZATION N/A 1/18/2024    Procedure: Coronary angiography;  Surgeon: Devon Cristobal MD;  [facility];  Service: Cardiovascular;  Laterality: N/A;    CARDIAC CATHETERIZATION N/A 1/18/2024    Procedure: Resting Full Cycle Ratio;  Surgeon: Devon Cristobal MD;  Location: [facility] CATH INVASIVE LOCATION;  Service: Cardiovascular;  Laterality: N/A;    CHOLECYSTECTOMY      COLONOSCOPY      CORONARY STENT PLACEMENT  [id number]    FRACTURE SURGERY      SHOULDER ARTHROSCOPY Bilateral     removal of bone spurs    UPPER  "GASTROINTESTINAL ENDOSCOPY         Review of Systems   Constitutional: Negative.    Respiratory: Negative.     Cardiovascular: Negative.    Gastrointestinal:  Positive for diarrhea. Negative for nausea and vomiting.   Skin:         Left hand middle finger scab   Neurological: Negative.    Psychiatric/Behavioral: Negative.         No Known Allergies    Objective     Vitals:    05/30/25 1517   BP: 120/65   BP Location: Right arm   Patient Position: Sitting   Cuff Size: Adult   Pulse: 98   Resp: 16   Temp: 98.2 °F (36.8 °C)   TempSrc: Oral   SpO2: 95%   Weight: 82 kg (180 lb 11.2 oz)   Height: 170.2 cm (67.01\")     Body mass index is 28.29 kg/m².    Physical Exam  Vitals reviewed.   HENT:      Head: Normocephalic.   Eyes:      Pupils: Pupils are equal, round, and reactive to light.   Neck:      Vascular: No carotid bruit.   Cardiovascular:      Rate and Rhythm: Normal rate and regular rhythm.      Pulses: Normal pulses.   Pulmonary:      Effort: Pulmonary effort is normal.      Breath sounds: Normal breath sounds.   Musculoskeletal:         General: Normal range of motion.      Right lower leg: No edema.      Left lower leg: No edema.   Skin:     General: Skin is warm and dry.      Comments: Left hand middle finger: healing scab noted, minor redness noted, no swelling noted, no discharge noted, no warmth noted.   Neurological:      Mental Status: He is alert and oriented to person, place, and time.   Psychiatric:         Behavior: Behavior normal.           Current Outpatient Medications:     atorvastatin (Lipitor) 40 MG tablet, Take 1 tablet by mouth Daily., Disp: 90 tablet, Rfl: 3    cholecalciferol (VITAMIN D3) 10 MCG (400 UNIT) tablet, Take 1 tablet by mouth Daily., Disp: 90 tablet, Rfl: 4    clopidogrel (PLAVIX) 75 MG tablet, Take 1 tablet by mouth Daily., Disp: 90 tablet, Rfl: 3    Continuous Glucose Sensor (FreeStyle Eulalio 14 Day Sensor) misc, Inject 1 each under the skin into the appropriate area as directed " Every 14 (Fourteen) Days., Disp: 2 each, Rfl: 11    Dulaglutide (Trulicity) 4.5 MG/0.5ML solution auto-injector, Inject 4.5 mg under the skin into the appropriate area as directed 1 (One) Time Per Week., Disp: 6 mL, Rfl: 1    Empagliflozin-metFORMIN HCl ER (Synjardy XR) 12.5-1000 MG tablet sustained-release 24 hour, Take 1 tablet by mouth 2 (Two) Times a Day., Disp: 180 tablet, Rfl: 3    esomeprazole (nexIUM) 40 MG capsule, Take 1 capsule by mouth 2 (Two) Times a Day., Disp: 180 capsule, Rfl: 3    Insulin Glargine (BASAGLAR KWIKPEN) 100 UNIT/ML injection pen, Inject 32 Units under the skin into the appropriate area as directed Daily., Disp: 45 mL, Rfl: 3    lisinopril (PRINIVIL,ZESTRIL) 10 MG tablet, Take 1 tablet by mouth Daily., Disp: 90 tablet, Rfl: 3    sildenafil (VIAGRA) 25 MG tablet, Take 1 tablet by mouth As Needed for Erectile Dysfunction., Disp: 90 tablet, Rfl: 0    cholestyramine (Questran) 4 g packet, Take 1 packet by mouth 2 (Two) Times a Day With Meals., Disp: 60 packet, Rfl: 5    ondansetron (ZOFRAN) 4 MG tablet, Take 1 tablet by mouth Every 8 (Eight) Hours As Needed for Nausea or Vomiting. (Patient not taking: Reported on 5/30/2025), Disp: 12 tablet, Rfl: 1    triamcinolone (KENALOG) 0.025 % ointment, Apply 1 Application topically to the appropriate area as directed 2 (Two) Times a Day for 14 days., Disp: 15 g, Rfl: 0    Recent Results (from the past 12 weeks)   CBC & Differential    Collection Time: 05/23/25  7:50 AM    Specimen: Blood   Result Value Ref Range    WBC 6.6 3.4 - 10.8 x10E3/uL    RBC 5.21 4.14 - 5.80 x10E6/uL    Hemoglobin 15.6 13.0 - 17.7 g/dL    Hematocrit 48.1 37.5 - 51.0 %    MCV 92 79 - 97 fL    MCH 29.9 26.6 - 33.0 pg    MCHC 32.4 31.5 - 35.7 g/dL    RDW 12.8 11.6 - 15.4 %    Platelets 344 150 - 450 x10E3/uL    Neutrophil Rel % 69 Not Estab. %    Lymphocyte Rel % 21 Not Estab. %    Monocyte Rel % 8 Not Estab. %    Eosinophil Rel % 1 Not Estab. %    Basophil Rel % 1 Not Estab. %     Neutrophils Absolute 4.6 1.4 - 7.0 x10E3/uL    Lymphocytes Absolute 1.4 0.7 - 3.1 x10E3/uL    Monocytes Absolute 0.5 0.1 - 0.9 x10E3/uL    Eosinophils Absolute 0.1 0.0 - 0.4 x10E3/uL    Basophils Absolute 0.0 0.0 - 0.2 x10E3/uL    Immature Granulocyte Rel % 0 Not Estab. %    Immature Grans Absolute 0.0 0.0 - 0.1 x10E3/uL   Lipid Panel With LDL / HDL Ratio    Collection Time: 05/23/25  7:50 AM    Specimen: Blood   Result Value Ref Range    Total Cholesterol 156 100 - 199 mg/dL    Triglycerides 155 (H) 0 - 149 mg/dL    HDL Cholesterol 47 >39 mg/dL    VLDL Cholesterol Mookie 27 5 - 40 mg/dL    LDL Chol Calc (NIH) 82 0 - 99 mg/dL    LDL/HDL RATIO 1.7 0.0 - 3.6 ratio   Comprehensive Metabolic Panel    Collection Time: 05/23/25  7:50 AM    Specimen: Blood   Result Value Ref Range    Glucose 123 (H) 70 - 99 mg/dL    BUN 23 8 - 27 mg/dL    Creatinine 1.04 0.76 - 1.27 mg/dL    EGFR Result 80 >59 mL/min/1.73    BUN/Creatinine Ratio 22 10 - 24    Sodium 138 134 - 144 mmol/L    Potassium 5.4 (H) 3.5 - 5.2 mmol/L    Chloride 101 96 - 106 mmol/L    Total CO2 23 20 - 29 mmol/L    Calcium 9.7 8.6 - 10.2 mg/dL    Total Protein 6.5 6.0 - 8.5 g/dL    Albumin 4.6 3.9 - 4.9 g/dL    Globulin 1.9 1.5 - 4.5 g/dL    Total Bilirubin 0.6 0.0 - 1.2 mg/dL    Alkaline Phosphatase 69 44 - 121 IU/L    AST (SGOT) 18 0 - 40 IU/L    ALT (SGPT) 11 0 - 44 IU/L   Hemoglobin A1c    Collection Time: 05/23/25  7:50 AM    Specimen: Blood   Result Value Ref Range    Hemoglobin A1C 7.5 (H) 4.8 - 5.6 %   Microalbumin / Creatinine Urine Ratio - Urine, Clean Catch    Collection Time: 05/23/25  7:50 AM    Specimen: Urine, Clean Catch   Result Value Ref Range    Creatinine, Urine CANCELED mg/dL    Microalbumin, Urine CANCELED    Unable To Void    Collection Time: 05/23/25  7:50 AM   Result Value Ref Range    Unable to Void Comment    Specimen Status Report    Collection Time: 05/23/25  7:50 AM   Result Value Ref Range    Specimen Status CANCELED        CT Pelvis  Without Contrast  Narrative: CT PELVIS WO CONTRAST-     DATE OF EXAM: 4/16/2025 3:41 PM     INDICATION: Fracture of sacrum or coccyx.     COMPARISON: Radiographs 1/24/2025. CT 12/18/2023.     TECHNIQUE: Multiple contiguous axial images were acquired through the  pelvis without the intravenous administration of contrast. Reformatted  coronal and sagittal sequences were also reviewed. Radiation dose  reduction techniques were utilized, including automated exposure control  and exposure modulation based on body size.     FINDINGS:  Nondisplaced fractures of the S5 vertebral segment and the first  coccygeal segment. No evidence of callus formation definitively healing.  No new fracture. No concerning osseous lesion. Stable sclerotic foci in  each proximal femur, the right pubic body, and the left superior  acetabulum, probable bone islands. Transitional lumbosacral vertebral  anatomy.     Patchy superficial subcutaneous soft tissue edema in the anterior right  lower abdominal wall may be related to medicinal injection or contusion.  No cystic or solid soft tissue mass. No abnormal bursal fluid  collection. No free fluid in the pelvis. No free intraperitoneal air.  The urinary bladder is nondistended. Enlarged prostate gland, measuring  5.5 cm in transverse diameter. Mild visualized colonic stool. The  appendix is normal. Moderate vascular calcifications. Small  fat-containing umbilical and bilateral inguinal hernias.     No significant joint effusion. Mild bilateral hip joint DJD. Mild DJD of  the pubic symphysis. Mild bilateral SI joint DJD. Partially imaged  lumbosacral spondylosis.     No noncontrast CT evidence of a full-thickness tendon tear. Mild diffuse  muscular fatty atrophy.     Impression: Nondisplaced fractures of the S5 vertebral segment and the first  coccygeal segment without specific CT evidence of interval healing.     This report was finalized on 4/17/2025 4:47 PM by Rafy Hammonds MD on  Workstation:  [id number]       Diagnoses and all orders for this visit:    1. Primary hypertension (Primary)  -     lisinopril (PRINIVIL,ZESTRIL) 10 MG tablet; Take 1 tablet by mouth Daily.  Dispense: 90 tablet; Refill: 3    2. Mixed hyperlipidemia  -     cholestyramine (Questran) 4 g packet; Take 1 packet by mouth 2 (Two) Times a Day With Meals.  Dispense: 60 packet; Refill: 5  -     atorvastatin (Lipitor) 40 MG tablet; Take 1 tablet by mouth Daily.  Dispense: 90 tablet; Refill: 3    3. Type 2 diabetes mellitus without complication, with long-term current use of insulin  -     Empagliflozin-metFORMIN HCl ER (Synjardy XR) 12.5-1000 MG tablet sustained-release 24 hour; Take 1 tablet by mouth 2 (Two) Times a Day.  Dispense: 180 tablet; Refill: 3  -     Insulin Glargine (BASAGLAR KWIKPEN) 100 UNIT/ML injection pen; Inject 32 Units under the skin into the appropriate area as directed Daily.  Dispense: 45 mL; Refill: 3    4. Gastroesophageal reflux disease, unspecified whether esophagitis present    5. Functional diarrhea  -     cholestyramine (Questran) 4 g packet; Take 1 packet by mouth 2 (Two) Times a Day With Meals.  Dispense: 60 packet; Refill: 5    6. Skin inflammation  -     triamcinolone (KENALOG) 0.025 % ointment; Apply 1 Application topically to the appropriate area as directed 2 (Two) Times a Day for 14 days.  Dispense: 15 g; Refill: 0    7. Screen for colon cancer        Patient Instructions   Hypertension: stable, continue lisinopril 10 mg daily    Hyperlipidemia: stable, continue atorvastatin 40 mg daily; recommend Patient to work on eating a more diabetic low fat diet, increase water intake and increase exercise as able.     Diabetes: HgA1c 7.5, previously 7.4, and before that 8. glargine Basaglar insulin 32 units daily, Synjardy XR 12.5-1000 mg 1 tablet by mouth 2 Times a Day and Trulicity 4.5 mg once per week.      GERD: continue esomeprazole 40 mg daily    Functional diarrhea: starting Questran 4 g packet 1 pkt  twice a day    Skin inflammation: to left hand middle finger, sending over script for triamcinolone ointment    Return if symptoms worsen or fail to improve, for fasting labs in 6 months, Annual physical.

## 2025-06-04 PROBLEM — K59.1 FUNCTIONAL DIARRHEA: Status: ACTIVE | Noted: 2025-06-04

## 2025-06-04 NOTE — PATIENT INSTRUCTIONS
Hypertension: stable, continue lisinopril 10 mg daily    Hyperlipidemia: stable, continue atorvastatin 40 mg daily; recommend Patient to work on eating a more diabetic low fat diet, increase water intake and increase exercise as able.     Diabetes: HgA1c 7.5, previously 7.4, and before that 8. glargine Basaglar insulin 32 units daily, Synjardy XR 12.5-1000 mg 1 tablet by mouth 2 Times a Day and Trulicity 4.5 mg once per week.      GERD: continue esomeprazole 40 mg daily    Functional diarrhea: starting Questran 4 g packet 1 pkt twice a day    Skin inflammation: to left hand middle finger, sending over script for triamcinolone ointment

## (undated) DEVICE — RUNTHROUGH NS EXTRA FLOPPY PTCA GUIDEWIRE: Brand: RUNTHROUGH

## (undated) DEVICE — CATH GUIDE GUIDELINERV3 5.5F 150CM

## (undated) DEVICE — TR BAND RADIAL ARTERY COMPRESSION DEVICE: Brand: TR BAND

## (undated) DEVICE — GW PRESSUREWIRE X WIRELESS FFR 175CM

## (undated) DEVICE — 6F .070 JR 4 100CM: Brand: CORDIS

## (undated) DEVICE — THE VASC BAND HEMOSTAT IS A COMPRESSION DEVICE TO ASSIST HEMOSTASIS OF ARTERIAL, VENOUS AND HEMODIALYSIS PERCUTANEOUS ACCESS SITES.: Brand: VASC BAND™ HEMOSTAT

## (undated) DEVICE — TREK CORONARY DILATATION CATHETER 2.50 MM X 12 MM / RAPID-EXCHANGE: Brand: TREK

## (undated) DEVICE — GLIDESHEATH SLENDER STAINLESS STEEL KIT: Brand: GLIDESHEATH SLENDER

## (undated) DEVICE — CATH GUIDE ZUMA2 EBU 6F 3.5X100CM

## (undated) DEVICE — PK CATH CARD 40

## (undated) DEVICE — NC TREK NEO™ CORONARY DILATATION CATHETER 3.50 MM X 15 MM / RAPID-EXCHANGE: Brand: NC TREK NEO™

## (undated) DEVICE — DEV INDEFLATOR P/N 580289

## (undated) DEVICE — GW EMR FIX EXCHG J STD .035 3MM 260CM

## (undated) DEVICE — KT MANIFLD CARDIAC

## (undated) DEVICE — CATH DIAG IMPULSE FL3.5 5F 100CM

## (undated) DEVICE — HI-TORQUE BALANCE MIDDLEWEIGHT GUIDE WIRE .014 STRAIGHT TIP 3.0 CM X 190 CM: Brand: HI-TORQUE BALANCE MIDDLEWEIGHT

## (undated) DEVICE — CATH VENT MIV RADL PIG ST TIP 5F 110CM

## (undated) DEVICE — HI-TORQUE WHISPER ES GUIDE WIRE .014 STRAIGHTTIP 3.0 CM X 190 CM: Brand: HI-TORQUE WHISPER

## (undated) DEVICE — NC TREK NEO™ CORONARY DILATATION CATHETER 3.50 MM X 20 MM / RAPID-EXCHANGE: Brand: NC TREK NEO™

## (undated) DEVICE — CATH DIAG IMPULSE FR4 5F 100CM

## (undated) DEVICE — NC TREK NEO™ CORONARY DILATATION CATHETER 3.50 MM X 8 MM / RAPID-EXCHANGE: Brand: NC TREK NEO™

## (undated) DEVICE — KT CATH IMG DRAGONFLY/OPSTAR 2.7F 135CM

## (undated) DEVICE — TREK CORONARY DILATATION CATHETER 3.0 MM X 12 MM / RAPID-EXCHANGE: Brand: TREK

## (undated) DEVICE — CATH DIAG DXTERITY ULTRA TRANSRADIAL 4.0 5F 100CM 0/SH

## (undated) DEVICE — Device: Brand: FIELDER XT

## (undated) DEVICE — COPILOT BLEEDBACK CONTROL VALVE: Brand: COPILOT